# Patient Record
Sex: MALE | Race: WHITE | NOT HISPANIC OR LATINO | URBAN - METROPOLITAN AREA
[De-identification: names, ages, dates, MRNs, and addresses within clinical notes are randomized per-mention and may not be internally consistent; named-entity substitution may affect disease eponyms.]

---

## 2021-08-08 ENCOUNTER — INPATIENT (INPATIENT)
Facility: HOSPITAL | Age: 81
LOS: 10 days | Discharge: HOME | End: 2021-08-19
Attending: THORACIC SURGERY (CARDIOTHORACIC VASCULAR SURGERY) | Admitting: THORACIC SURGERY (CARDIOTHORACIC VASCULAR SURGERY)
Payer: MEDICAID

## 2021-08-08 VITALS
TEMPERATURE: 96 F | WEIGHT: 164.91 LBS | RESPIRATION RATE: 18 BRPM | DIASTOLIC BLOOD PRESSURE: 54 MMHG | HEART RATE: 55 BPM | SYSTOLIC BLOOD PRESSURE: 90 MMHG | OXYGEN SATURATION: 99 %

## 2021-08-08 DIAGNOSIS — K52.1 TOXIC GASTROENTERITIS AND COLITIS: ICD-10-CM
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
## 2021-08-08 DIAGNOSIS — T82.855A STENOSIS OF CORONARY ARTERY STENT, INITIAL ENCOUNTER: ICD-10-CM
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
## 2021-08-08 LAB
ALBUMIN SERPL ELPH-MCNC: 4.3 G/DL — SIGNIFICANT CHANGE UP (ref 3.5–5.2)
ALP SERPL-CCNC: 69 U/L — SIGNIFICANT CHANGE UP (ref 30–115)
ALT FLD-CCNC: 17 U/L — SIGNIFICANT CHANGE UP (ref 0–41)
ANION GAP SERPL CALC-SCNC: 11 MMOL/L — SIGNIFICANT CHANGE UP (ref 7–14)
APTT BLD: 28.7 SEC — SIGNIFICANT CHANGE UP (ref 27–39.2)
APTT BLD: 85 SEC — CRITICAL HIGH (ref 27–39.2)
AST SERPL-CCNC: 20 U/L — SIGNIFICANT CHANGE UP (ref 0–41)
BASOPHILS # BLD AUTO: 0.01 K/UL — SIGNIFICANT CHANGE UP (ref 0–0.2)
BASOPHILS NFR BLD AUTO: 0.1 % — SIGNIFICANT CHANGE UP (ref 0–1)
BILIRUB SERPL-MCNC: 0.6 MG/DL — SIGNIFICANT CHANGE UP (ref 0.2–1.2)
BUN SERPL-MCNC: 15 MG/DL — SIGNIFICANT CHANGE UP (ref 10–20)
CALCIUM SERPL-MCNC: 9.4 MG/DL — SIGNIFICANT CHANGE UP (ref 8.5–10.1)
CHLORIDE SERPL-SCNC: 98 MMOL/L — SIGNIFICANT CHANGE UP (ref 98–110)
CO2 SERPL-SCNC: 25 MMOL/L — SIGNIFICANT CHANGE UP (ref 17–32)
CREAT SERPL-MCNC: 1.1 MG/DL — SIGNIFICANT CHANGE UP (ref 0.7–1.5)
EOSINOPHIL # BLD AUTO: 0.02 K/UL — SIGNIFICANT CHANGE UP (ref 0–0.7)
EOSINOPHIL NFR BLD AUTO: 0.2 % — SIGNIFICANT CHANGE UP (ref 0–8)
GLUCOSE SERPL-MCNC: 145 MG/DL — HIGH (ref 70–99)
HCT VFR BLD CALC: 41.7 % — LOW (ref 42–52)
HGB BLD-MCNC: 14.5 G/DL — SIGNIFICANT CHANGE UP (ref 14–18)
IMM GRANULOCYTES NFR BLD AUTO: 0.4 % — HIGH (ref 0.1–0.3)
INR BLD: 1.48 RATIO — HIGH (ref 0.65–1.3)
LYMPHOCYTES # BLD AUTO: 1.17 K/UL — LOW (ref 1.2–3.4)
LYMPHOCYTES # BLD AUTO: 12.3 % — LOW (ref 20.5–51.1)
MAGNESIUM SERPL-MCNC: 1.8 MG/DL — SIGNIFICANT CHANGE UP (ref 1.8–2.4)
MCHC RBC-ENTMCNC: 31.8 PG — HIGH (ref 27–31)
MCHC RBC-ENTMCNC: 34.8 G/DL — SIGNIFICANT CHANGE UP (ref 32–37)
MCV RBC AUTO: 91.4 FL — SIGNIFICANT CHANGE UP (ref 80–94)
MONOCYTES # BLD AUTO: 0.8 K/UL — HIGH (ref 0.1–0.6)
MONOCYTES NFR BLD AUTO: 8.4 % — SIGNIFICANT CHANGE UP (ref 1.7–9.3)
NEUTROPHILS # BLD AUTO: 7.5 K/UL — HIGH (ref 1.4–6.5)
NEUTROPHILS NFR BLD AUTO: 78.6 % — HIGH (ref 42.2–75.2)
NRBC # BLD: 0 /100 WBCS — SIGNIFICANT CHANGE UP (ref 0–0)
NT-PROBNP SERPL-SCNC: 1531 PG/ML — HIGH (ref 0–300)
PLATELET # BLD AUTO: 169 K/UL — SIGNIFICANT CHANGE UP (ref 130–400)
POTASSIUM SERPL-MCNC: 4.4 MMOL/L — SIGNIFICANT CHANGE UP (ref 3.5–5)
POTASSIUM SERPL-SCNC: 4.4 MMOL/L — SIGNIFICANT CHANGE UP (ref 3.5–5)
PROT SERPL-MCNC: 6.6 G/DL — SIGNIFICANT CHANGE UP (ref 6–8)
PROTHROM AB SERPL-ACNC: 17 SEC — HIGH (ref 9.95–12.87)
RBC # BLD: 4.56 M/UL — LOW (ref 4.7–6.1)
RBC # FLD: 11.4 % — LOW (ref 11.5–14.5)
SARS-COV-2 RNA SPEC QL NAA+PROBE: SIGNIFICANT CHANGE UP
SODIUM SERPL-SCNC: 134 MMOL/L — LOW (ref 135–146)
TROPONIN T SERPL-MCNC: 0.04 NG/ML — CRITICAL HIGH
TROPONIN T SERPL-MCNC: 0.04 NG/ML — CRITICAL HIGH
TROPONIN T SERPL-MCNC: 0.15 NG/ML — CRITICAL HIGH
WBC # BLD: 9.54 K/UL — SIGNIFICANT CHANGE UP (ref 4.8–10.8)
WBC # FLD AUTO: 9.54 K/UL — SIGNIFICANT CHANGE UP (ref 4.8–10.8)

## 2021-08-08 PROCEDURE — 71046 X-RAY EXAM CHEST 2 VIEWS: CPT | Mod: 26

## 2021-08-08 PROCEDURE — 99222 1ST HOSP IP/OBS MODERATE 55: CPT

## 2021-08-08 PROCEDURE — 93010 ELECTROCARDIOGRAM REPORT: CPT

## 2021-08-08 PROCEDURE — 99291 CRITICAL CARE FIRST HOUR: CPT

## 2021-08-08 RX ORDER — SODIUM CHLORIDE 9 MG/ML
500 INJECTION INTRAMUSCULAR; INTRAVENOUS; SUBCUTANEOUS ONCE
Refills: 0 | Status: COMPLETED | OUTPATIENT
Start: 2021-08-08 | End: 2021-08-08

## 2021-08-08 RX ORDER — ATORVASTATIN CALCIUM 80 MG/1
40 TABLET, FILM COATED ORAL AT BEDTIME
Refills: 0 | Status: DISCONTINUED | OUTPATIENT
Start: 2021-08-08 | End: 2021-08-12

## 2021-08-08 RX ORDER — ATENOLOL 25 MG/1
50 TABLET ORAL DAILY
Refills: 0 | Status: DISCONTINUED | OUTPATIENT
Start: 2021-08-09 | End: 2021-08-09

## 2021-08-08 RX ORDER — HEPARIN SODIUM 5000 [USP'U]/ML
900 INJECTION INTRAVENOUS; SUBCUTANEOUS
Qty: 25000 | Refills: 0 | Status: DISCONTINUED | OUTPATIENT
Start: 2021-08-08 | End: 2021-08-09

## 2021-08-08 RX ORDER — HEPARIN SODIUM 5000 [USP'U]/ML
INJECTION INTRAVENOUS; SUBCUTANEOUS
Qty: 25000 | Refills: 0 | Status: DISCONTINUED | OUTPATIENT
Start: 2021-08-08 | End: 2021-08-08

## 2021-08-08 RX ORDER — DRONEDARONE 400 MG/1
400 TABLET, FILM COATED ORAL
Refills: 0 | Status: DISCONTINUED | OUTPATIENT
Start: 2021-08-09 | End: 2021-08-12

## 2021-08-08 RX ORDER — ASPIRIN/CALCIUM CARB/MAGNESIUM 324 MG
325 TABLET ORAL ONCE
Refills: 0 | Status: COMPLETED | OUTPATIENT
Start: 2021-08-08 | End: 2021-08-08

## 2021-08-08 RX ORDER — HEPARIN SODIUM 5000 [USP'U]/ML
4400 INJECTION INTRAVENOUS; SUBCUTANEOUS EVERY 6 HOURS
Refills: 0 | Status: DISCONTINUED | OUTPATIENT
Start: 2021-08-08 | End: 2021-08-09

## 2021-08-08 RX ORDER — AMLODIPINE BESYLATE 2.5 MG/1
10 TABLET ORAL DAILY
Refills: 0 | Status: DISCONTINUED | OUTPATIENT
Start: 2021-08-09 | End: 2021-08-11

## 2021-08-08 RX ORDER — HEPARIN SODIUM 5000 [USP'U]/ML
4400 INJECTION INTRAVENOUS; SUBCUTANEOUS ONCE
Refills: 0 | Status: COMPLETED | OUTPATIENT
Start: 2021-08-08 | End: 2021-08-08

## 2021-08-08 RX ADMIN — HEPARIN SODIUM 9 UNIT(S)/HR: 5000 INJECTION INTRAVENOUS; SUBCUTANEOUS at 23:42

## 2021-08-08 RX ADMIN — SODIUM CHLORIDE 500 MILLILITER(S): 9 INJECTION INTRAMUSCULAR; INTRAVENOUS; SUBCUTANEOUS at 15:22

## 2021-08-08 RX ADMIN — ATORVASTATIN CALCIUM 40 MILLIGRAM(S): 80 TABLET, FILM COATED ORAL at 22:30

## 2021-08-08 RX ADMIN — HEPARIN SODIUM 4400 UNIT(S): 5000 INJECTION INTRAVENOUS; SUBCUTANEOUS at 15:29

## 2021-08-08 RX ADMIN — HEPARIN SODIUM 900 UNIT(S)/HR: 5000 INJECTION INTRAVENOUS; SUBCUTANEOUS at 15:29

## 2021-08-08 RX ADMIN — SODIUM CHLORIDE 500 MILLILITER(S): 9 INJECTION INTRAMUSCULAR; INTRAVENOUS; SUBCUTANEOUS at 13:47

## 2021-08-08 NOTE — ED PROVIDER NOTE - PROGRESS NOTE DETAILS
Dr. Cordova: Pt has elevated troponin. ASA ordered and cardiology consulted and will evaluate pt. Dr. Cordova: talked with cardiology. They recommend heparin trip and tele. If troponin increases they will consider CCU upgrade.

## 2021-08-08 NOTE — H&P ADULT - ASSESSMENT
Chest Pain: likely unstable angina  H/O CAD s/p Stents 2010  Troponin: 0.4 x2, f/up 8pm levels  EKG with AFIB, no ST changes   Evaluated by Cardio, possible Cath in AM  C/w Heparin Infusion and f/up PTT's   Sublingual Nitro for continued CP   NPO after midnight    Atrial Fibrillation  EKG on admission with AFIB   Eliquis Switched to Heparin drip per ACS protocol   Rate control with Multaq and Atenolol    HTN + HLD:  Hypotensive on admission likely from drug overdose   c/w Amlodipine,  Atenolol and Statin   **Hold in Meds in AM if BP is still low***    DVT PPX: Heparin drip  GI PPX: not indicated  Full Code  Dispo: from Home  -Pending Cardi Clearance

## 2021-08-08 NOTE — ED PROVIDER NOTE - ABNORMAL RHYTHM
----- Message from Jeannette Ibarra sent at 11/26/2019  8:51 AM CST -----  Re: meds he is taking  Would like to speak to Dr Mario SCHROEDER [phone]    
Discussed with patient.  Recent flare starting during trip to [place].  No recent antibiotics.  Did develop what he thought was a minor infection in [place].    Symptoms still persist despite mesalamine 4.8 g daily.    Recommend resume budesonide 3 tablets daily x2 weeks then reduce to 2 pills daily.  Has appointment coming up in 3 to 4 weeks.  Take calcium while on budesonide.  All questions answered  
Per Dr Martin advice, Taking Mesalamine 4 pills per day for last five days. Still having problems with colitis.  Please call [phone]  
atrial fibrillation

## 2021-08-08 NOTE — ED PROVIDER NOTE - PHYSICAL EXAMINATION
Const: Well nourished, well developed, appears stated age  Eyes: PERRL, no conjunctival injection  HENT:  Neck supple without meningismus   CV: RRR, Warm, well-perfused extremities  RESP: CTA B/L, no tachypnea   GI: soft, non-tender, non-distended  MSK: No gross deformities appreciated  Skin: Warm, dry. No rashes  Neuro: Alert, CNs II-XII grossly intact. Sensation and motor function of extremities grossly intact.  Psych: Appropriate mood and affect. Const: Well nourished, well developed, appears stated age  Eyes: PERRL, no conjunctival injection  HENT:  Neck supple without meningismus   CV: irregular and bradycardic Warm, well-perfused extremities  RESP: CTA B/L, no tachypnea   GI: soft, non-tender, non-distended  MSK: No gross deformities appreciated  Skin: Warm, dry. No rashes  Neuro: Alert, CNs II-XII grossly intact. Sensation and motor function of extremities grossly intact.  Psych: Appropriate mood and affect.

## 2021-08-08 NOTE — ED PROVIDER NOTE - NS ED ROS FT
Review of Systems   Constitutional:  No Weight Change, No Fever, No Chills, No weakness    ENT/Mouth:  No Nasal Congestion, No Hoarseness, No sore throat, No Rhinorrhea, No Swallowing Difficulty  Eyes:  No Eye Pain, No Swelling, No Redness, No Discharge, No Vision Changes  Cardiovascular:  + Chest Pain, + palpitations, + Dyspnea on Exertion, No Orthopnea  Respiratory:  No SOB, No Cough, No Wheezing  Gastrointestinal:  No Nausea, No Vomiting, No Diarrhea, No Constipation, No abdominal pain, No melena or bright red blood per rectum  Genitourinary:  No Dysuria, No Urinary Frequency, No Hematuria, No Urinary Incontinence,  Musculoskeletal:  No Arthralgias, No Myalgias, No Joint Swelling, No Joint Stiffness,  Skin:  No rashes

## 2021-08-08 NOTE — ED ADULT NURSE NOTE - NSSUHOSCREENINGYN_ED_ALL_ED
Spoke with Pt. Son [facility] that she lives with. Update given as requested. Home meds reviewed and updated. Pt. Previously on Valcyclovir  1 gm. TID- Last dose was on 9/14 and he believed patient had three more days to complete prescription. Yes - the patient is able to be screened

## 2021-08-08 NOTE — ED PROVIDER NOTE - CLINICAL SUMMARY MEDICAL DECISION MAKING FREE TEXT BOX
82 yo M presented to ED for CP. Pt found to have ACS with elevated troponin of 0.04. Cardiology was consulted and recommended heparin drip. Pt also loaded with ASA. Cardiology will fu and if pt has uptrending troponin will consider upgrade to CCU. Pt admitted to tele for further evaluation and management.

## 2021-08-08 NOTE — ED ADULT NURSE NOTE - OBJECTIVE STATEMENT
Patient c/o midsternal chest pain radiating to back x 2 days increasing today, +nausea and dizziness. Patient denies cardiac hx. Patient states pain comes and goes and describes the pain as sharp

## 2021-08-08 NOTE — CONSULT NOTE ADULT - SUBJECTIVE AND OBJECTIVE BOX
HISTORY OF PRESENT ILLNESS:   80 yo M hx of CAD s/p PCI x 3 last 2010, Afib on Eliquis and HLD presented with cc of chest pain and syncope. Patient felt palpitations at home overnight. Patient took extra dose of multaq 400mg and atenolol 50mg at 2am. Patient developed syncope x 2 witnessed by wife and reported no trauma to the head. Patient continue to experience chest pressure and subsequently came to ED at [facility]. At the time of this eval, patient continue to experience chest pressure. SBP initially 90s and recovered to 107 with HR 55.     PAST MEDICAL & SURGICAL HISTORY  CAD  HLD  HTN    SOCIAL HISTORY:  []smoker  []Alcohol  []Drug    ROS:  CONSTITUTIONAL: No weakness, fevers or chills  EYES/ENT: No visual changes;  No vertigo or throat pain   NECK: No pain or stiffness  RESPIRATORY: No cough, wheezing, hemoptysis; No shortness of breath  CARDIOVASCULAR: chest pressure and palpitations  GASTROINTESTINAL: No abdominal or epigastric pain. No nausea, vomiting, or hematemesis; No diarrhea or constipation. No melena or hematochezia.  GENITOURINARY: No dysuria, frequency or hematuria  NEUROLOGICAL: No numbness or weakness  SKIN: No itching, burning, rashes, or lesions   PSYCH: No Depression, no anxiety  All other review of systems is negative unless indicated above.    ALLERGIES:  Allergy Status Unknown      MEDICATIONS:  MEDICATIONS  (STANDING):  heparin   Injectable 4400 Unit(s) IV Push once  heparin  Infusion.  Unit(s)/Hr (9 mL/Hr) IV Continuous <Continuous>    MEDICATIONS  (PRN):  heparin   Injectable 4400 Unit(s) IV Push every 6 hours PRN For aPTT less than 40      HOME MEDICATIONS:  Home Medications:      VITALS:   T(F): 96.2 (08-08 @ 12:09), Max: 96.2 (08-08 @ 12:09)  HR: 55 (08-08 @ 14:51) (55 - 55)  BP: 106/56 (08-08 @ 14:51) (90/54 - 106/56)  BP(mean): --  RR: 18 (08-08 @ 14:51) (18 - 18)  SpO2: 99% (08-08 @ 14:51) (99% - 99%)    I&O's Summary      PHYSICAL EXAM:  GEN: Not in acute distress  HEENT: NCAT, PERRL, EOMI  LUNGS: Clear to auscultation bilaterally   CARDIOVASCULAR: Regular bradycardic  ABD: Soft, non-tender, non-distended  EXT: No CHERYL  SKIN: Intact  NEURO: AAOx3    LABS:                        14.5   9.54  )-----------( 169      ( 08 Aug 2021 13:23 )             41.7     08-08    134<L>  |  98  |  15  ----------------------------<  145<H>  4.4   |  25  |  1.1    Ca    9.4      08 Aug 2021 13:23  Mg     1.8     08-08    TPro  6.6  /  Alb  4.3  /  TBili  0.6  /  DBili  x   /  AST  20  /  ALT  17  /  AlkPhos  69  08-08      Troponin T, Serum: 0.04 ng/mL *HH* (08-08-21 @ 13:23)    Troponin 0.04, CKMB --, CK --/ 08-08-21 @ 13:23    Serum Pro-Brain Natriuretic Peptide: 1531 pg/mL (08-08-21 @ 13:23)      Hemoglobin A1C   Thyroid    EKG: Afib with intermittent block  Tele: sinus ryan with PVC

## 2021-08-08 NOTE — H&P ADULT - NSHPREVIEWOFSYSTEMS_GEN_ALL_CORE
REVIEW OF SYSTEMS:    CONSTITUTIONAL: No weakness, fevers or chills  EYES/ENT: No visual changes;  No vertigo or throat pain   NECK: No pain or stiffness  RESPIRATORY: No cough, wheezing, hemoptysis; No shortness of breath  CARDIOVASCULAR: + chest pain or palpitations  GASTROINTESTINAL: No abdominal or epigastric pain. No nausea, vomiting, or hematemesis; No diarrhea or constipation. No melena or hematochezia.  GENITOURINARY: No dysuria, frequency or hematuria  NEUROLOGICAL: No numbness or weakness  SKIN: No itching, rashes REVIEW OF SYSTEMS:    CONSTITUTIONAL: No weakness, fevers or chills  EYES/ENT: No visual changes;  No vertigo or throat pain   NECK: No pain or stiffness  RESPIRATORY: No cough, wheezing, hemoptysis; No shortness of breath  CARDIOVASCULAR: + chest pain + palpitations  GASTROINTESTINAL: No abdominal or epigastric pain. No nausea, vomiting, or hematemesis; No diarrhea or constipation. No melena or hematochezia.  GENITOURINARY: No dysuria, frequency or hematuria  NEUROLOGICAL: No numbness or weakness  SKIN: No itching, rashes

## 2021-08-08 NOTE — H&P ADULT - NSHPLABSRESULTS_GEN_ALL_CORE
LABS:                          14.5   9.54  )-----------( 169      ( 08 Aug 2021 13:23 )             41.7     08-08    134<L>  |  98  |  15  ----------------------------<  145<H>  4.4   |  25  |  1.1    Ca    9.4      08 Aug 2021 13:23  Mg     1.8     08-08    TPro  6.6  /  Alb  4.3  /  TBili  0.6  /  DBili  x   /  AST  20  /  ALT  17  /  AlkPhos  69  08-08            PT/INR - ( 08 Aug 2021 15:14 )   PT: 17.00 sec;   INR: 1.48 ratio         PTT - ( 08 Aug 2021 15:14 )  PTT:28.7 sec  Lactate Trend    CARDIAC MARKERS ( 08 Aug 2021 15:14 )  x     / 0.04 ng/mL / x     / x     / x      CARDIAC MARKERS ( 08 Aug 2021 13:23 )  x     / 0.04 ng/mL / x     / x     / x          CAPILLARY BLOOD GLUCOSE    EKGS:  < from: 12 Lead ECG (08.08.21 @ 12:14) >    Ventricular Rate 97 BPM    QRS Duration 100 ms    Q-T Interval 388 ms    QTC Calculation(Bazett) 492 ms    R Axis 51 degrees    T Axis 77 degrees    Diagnosis Line Atrial fibrillation  Minimal voltage criteria for LVH, may be normal variant ( [place] product )  Prolonged QT  Abnormal ECG      < end of copied text >

## 2021-08-08 NOTE — CONSULT NOTE ADULT - ASSESSMENT
80 yo M hx of CAD s/p PCI x 3 last 2010, Afib on Eliquis and HLD presented with cc of chest pain and syncope.     ACS  Syncope  Afib  Bradycardia    - Patient currently continue to experience chest pressure and CE elevated on admission.  - will start heparin gtt. Tentatively placed on schedule for cath in the am.   - Please keep patient NPO after MN. Admission COVID test pending result.  - Will continue to trend CE. If persistent elevation or HD unstable, will escalate to CCU level of care.  - Check TTE.  - Monitor on tele.  - Hold tonight's dose of multaq, atenolol and eliquis. Restart multaq and atenolol in the am if HD stable.  - Plan discussed with attending cardiologist.

## 2021-08-08 NOTE — ED PROVIDER NOTE - OBJECTIVE STATEMENT
82 yo M with PMH of CAD sp 3 stents (2001, 2010), HTN, afib (on atenolol, Multaq and Eliquis) presents to ED for CP that started last night around 11pm. Pt states the CP radiated to his L arm and was associated with palpitations. Pt follows with a cardiologist in [place] and was told if this occurs he should take an extra dose of his medications so around 2am he took an extra dose of Multaq and atenolol and took his morning dose. However, he came to the ED today because his is still having the CP and palpitations. He also noticed that he has CP with exertion which is new for him. He normally walks a lot but has needed to stop to rest while walking up the stairs due to CP. No LE swelling. No nausea, vomiting, abdominal pain, fevers or chills. Pt vaccinated against COVID. Pt has not had a cath or stress test in many years.

## 2021-08-08 NOTE — H&P ADULT - ATTENDING COMMENTS
81 year old male with PMH of CAD s/p PCI x 3 last 2010, Afib on Eliquis and HLD presented with cc of chest pain. Chest pain started  last night around 11pm, described as heavy pressure, 6/10, with radiation to left arm associated with Palpitations, he took an extra dose of Multaq (Extra 400mg) and atenolol(extra 50mg)  but his pain continued so he came to ED. He has mild exertional SOB, no fever or cough. IN ED: Pt was hypotensive to 90's with improvement to 100's  initially 90s, bradycardic to  HR 50's   Labs: Trop: 0.04 x 2, EKG: Afib, no ST changes     PHYSICAL EXAM:  GENERAL: NAD, well-developed.  HEAD:  Atraumatic, Normocephalic.  EYES: EOMI, PERRLA, conjunctiva and sclera clear.  NECK: Supple, No JVD.  CHEST/LUNG: Clear to auscultation bilaterally; No wheeze.  HEART: Regular rate and rhythm; S1 S2.   ABDOMEN: Soft, Nontender, Nondistended; Bowel sounds present.  EXTREMITIES:  2+ Peripheral Pulses, No clubbing, cyanosis, or edema.  PSYCH: AAOx3.  NEUROLOGY: non-focal.  SKIN: No rashes or lesions.    A/P:   NSTEMI  CAD s/p PCI  EKG showed T wave inversion I, aVL.   Troponin peak 0.14  Cardiac cath today showed two lesion on LAD proximal 80%, mid 90%, LCx 90% stenosis after Om bifurcation, % stenosis in distal edge of previous stent.   CT surgery consult for bypass.   Continue Anticoagulation with Lovenox for another 24 hrs. Continue ASA, Atenolol (dose reduced to 25mg) and Lipitor 80mg daily.     Paroxysmal Atrial fibrillation;   Sinus rhythm today  Continue Multaq and Atenolol. Eliquis on hold while on Lovenox.

## 2021-08-08 NOTE — H&P ADULT - NSHPPHYSICALEXAM_GEN_ALL_CORE
General: NAD, resting comfortably in bed     Heart: S1/S2 appreciated, RRR, no Murmurs     Lungs: CTA b/l, no adventitious sounds    Abdomen: Soft, NT, ND    Musculoskeletal: Atraumatic, No LE edema, no skin color changes     Neuro: AO x 3, no focal deficit

## 2021-08-08 NOTE — H&P ADULT - HISTORY OF PRESENT ILLNESS
Patient is a 82 yo M hx of CAD s/p PCI x 3 last 2010, Afib on Eliquis and HLD presented with cc of chest pain. Chest pain started  last night around 11pm, described as heavy pressure, 6/10, with radiation to left arm associated with Palpitations. Patient follows with a cardiologist in [place] and was told if this occurs he should take an extra dose of his medications so around 2am he took an extra dose of Multaq (Extra 400mg) and atenolol(extra 50mg)  and took his morning dose. However, he came to the ED today because his is still having the CP and palpitations. He also noticed that he has CP with exertion which is new for him. He normally walks a lot but has needed to stop to rest while walking up the stairs due to CP. No LE swelling. No nausea, vomiting, abdominal pain, fevers or chills. Pt vaccinated against       IN ED: Pt was hypotensive to 90's with improvement to 100's  initially 90s, bradycardic to  HR 50's   Labs: Trop: 0.04 x 2, EKG: Afib, no ST changes      Patient is a 82 yo M hx of CAD s/p PCI x 3 last 2010, Afib on Eliquis and HLD presented with cc of chest pain. Chest pain started  last night around 11pm, described as heavy pressure, 6/10, with radiation to left arm associated with Palpitations. Patient follows with a cardiologist in [place] and was told if this occurs he should take an extra dose of his medications so around 2am he took an extra dose of Multaq (Extra 400mg) and atenolol(extra 50mg)  and took his morning dose. However, he came to the ED today because his is still having the CP and palpitations. He also noticed that he has CP with exertion which is new for him. He normally walks a lot but has needed to stop to rest while walking up the stairs due to CP. No LE swelling. No nausea, vomiting, abdominal pain, fevers or chills.    IN ED: Pt was hypotensive to 90's with improvement to 100's  initially 90s, bradycardic to  HR 50's   Labs: Trop: 0.04 x 2, EKG: Afib, no ST changes

## 2021-08-08 NOTE — CONSULT NOTE ADULT - ATTENDING COMMENTS
Patient seen and examined with the fellow 8/8/21 at 2:45 PM in the ER  Still with episodes of chest pain, suggestive of angina  Known CAD, s/p prior PCI's  Mild troponin elevation  Paroxysmal AF - converted to SB  Follows with Dr. Tapia in [place].    Admit for ACS  Heparin gtt. Hold Eliquis  C/w BB, statin  ASA  Cath in AM. If he needs a PCI, will load with Plavix  D/w patient and wife at the bedside.

## 2021-08-09 LAB
ANION GAP SERPL CALC-SCNC: 9 MMOL/L — SIGNIFICANT CHANGE UP (ref 7–14)
APTT BLD: 54.8 SEC — HIGH (ref 27–39.2)
APTT BLD: 62 SEC — HIGH (ref 27–39.2)
BASOPHILS # BLD AUTO: 0.01 K/UL — SIGNIFICANT CHANGE UP (ref 0–0.2)
BASOPHILS NFR BLD AUTO: 0.1 % — SIGNIFICANT CHANGE UP (ref 0–1)
BUN SERPL-MCNC: 13 MG/DL — SIGNIFICANT CHANGE UP (ref 10–20)
CALCIUM SERPL-MCNC: 8.8 MG/DL — SIGNIFICANT CHANGE UP (ref 8.5–10.1)
CHLORIDE SERPL-SCNC: 104 MMOL/L — SIGNIFICANT CHANGE UP (ref 98–110)
CO2 SERPL-SCNC: 25 MMOL/L — SIGNIFICANT CHANGE UP (ref 17–32)
COVID-19 SPIKE DOMAIN AB INTERP: POSITIVE
COVID-19 SPIKE DOMAIN ANTIBODY RESULT: >250 U/ML — HIGH
CREAT SERPL-MCNC: 0.9 MG/DL — SIGNIFICANT CHANGE UP (ref 0.7–1.5)
EOSINOPHIL # BLD AUTO: 0.05 K/UL — SIGNIFICANT CHANGE UP (ref 0–0.7)
EOSINOPHIL NFR BLD AUTO: 0.5 % — SIGNIFICANT CHANGE UP (ref 0–8)
GLUCOSE SERPL-MCNC: 122 MG/DL — HIGH (ref 70–99)
HCT VFR BLD CALC: 43.2 % — SIGNIFICANT CHANGE UP (ref 42–52)
HGB BLD-MCNC: 15 G/DL — SIGNIFICANT CHANGE UP (ref 14–18)
IMM GRANULOCYTES NFR BLD AUTO: 0.4 % — HIGH (ref 0.1–0.3)
LYMPHOCYTES # BLD AUTO: 1.9 K/UL — SIGNIFICANT CHANGE UP (ref 1.2–3.4)
LYMPHOCYTES # BLD AUTO: 19.8 % — LOW (ref 20.5–51.1)
MCHC RBC-ENTMCNC: 31.9 PG — HIGH (ref 27–31)
MCHC RBC-ENTMCNC: 34.7 G/DL — SIGNIFICANT CHANGE UP (ref 32–37)
MCV RBC AUTO: 91.9 FL — SIGNIFICANT CHANGE UP (ref 80–94)
MONOCYTES # BLD AUTO: 0.92 K/UL — HIGH (ref 0.1–0.6)
MONOCYTES NFR BLD AUTO: 9.6 % — HIGH (ref 1.7–9.3)
NEUTROPHILS # BLD AUTO: 6.67 K/UL — HIGH (ref 1.4–6.5)
NEUTROPHILS NFR BLD AUTO: 69.6 % — SIGNIFICANT CHANGE UP (ref 42.2–75.2)
NRBC # BLD: 0 /100 WBCS — SIGNIFICANT CHANGE UP (ref 0–0)
PLATELET # BLD AUTO: 161 K/UL — SIGNIFICANT CHANGE UP (ref 130–400)
POTASSIUM SERPL-MCNC: 4 MMOL/L — SIGNIFICANT CHANGE UP (ref 3.5–5)
POTASSIUM SERPL-SCNC: 4 MMOL/L — SIGNIFICANT CHANGE UP (ref 3.5–5)
RBC # BLD: 4.7 M/UL — SIGNIFICANT CHANGE UP (ref 4.7–6.1)
RBC # FLD: 11.4 % — LOW (ref 11.5–14.5)
SARS-COV-2 IGG+IGM SERPL QL IA: >250 U/ML — HIGH
SARS-COV-2 IGG+IGM SERPL QL IA: POSITIVE
SODIUM SERPL-SCNC: 138 MMOL/L — SIGNIFICANT CHANGE UP (ref 135–146)
WBC # BLD: 9.59 K/UL — SIGNIFICANT CHANGE UP (ref 4.8–10.8)
WBC # FLD AUTO: 9.59 K/UL — SIGNIFICANT CHANGE UP (ref 4.8–10.8)

## 2021-08-09 PROCEDURE — 71250 CT THORAX DX C-: CPT | Mod: 26

## 2021-08-09 PROCEDURE — 93010 ELECTROCARDIOGRAM REPORT: CPT

## 2021-08-09 PROCEDURE — 93458 L HRT ARTERY/VENTRICLE ANGIO: CPT | Mod: 26

## 2021-08-09 PROCEDURE — 99232 SBSQ HOSP IP/OBS MODERATE 35: CPT | Mod: 57

## 2021-08-09 PROCEDURE — 99252 IP/OBS CONSLTJ NEW/EST SF 35: CPT | Mod: 57

## 2021-08-09 PROCEDURE — 93306 TTE W/DOPPLER COMPLETE: CPT | Mod: 26

## 2021-08-09 PROCEDURE — 93880 EXTRACRANIAL BILAT STUDY: CPT | Mod: 26

## 2021-08-09 PROCEDURE — 99223 1ST HOSP IP/OBS HIGH 75: CPT

## 2021-08-09 RX ORDER — ASPIRIN/CALCIUM CARB/MAGNESIUM 324 MG
81 TABLET ORAL DAILY
Refills: 0 | Status: DISCONTINUED | OUTPATIENT
Start: 2021-08-09 | End: 2021-08-12

## 2021-08-09 RX ORDER — ATENOLOL 25 MG/1
25 TABLET ORAL DAILY
Refills: 0 | Status: DISCONTINUED | OUTPATIENT
Start: 2021-08-09 | End: 2021-08-11

## 2021-08-09 RX ORDER — ENOXAPARIN SODIUM 100 MG/ML
70 INJECTION SUBCUTANEOUS
Refills: 0 | Status: DISCONTINUED | OUTPATIENT
Start: 2021-08-09 | End: 2021-08-11

## 2021-08-09 RX ORDER — SODIUM CHLORIDE 9 MG/ML
1000 INJECTION INTRAMUSCULAR; INTRAVENOUS; SUBCUTANEOUS
Refills: 0 | Status: DISCONTINUED | OUTPATIENT
Start: 2021-08-09 | End: 2021-08-13

## 2021-08-09 RX ADMIN — DRONEDARONE 400 MILLIGRAM(S): 400 TABLET, FILM COATED ORAL at 05:56

## 2021-08-09 RX ADMIN — SODIUM CHLORIDE 50 MILLILITER(S): 9 INJECTION INTRAMUSCULAR; INTRAVENOUS; SUBCUTANEOUS at 10:00

## 2021-08-09 RX ADMIN — AMLODIPINE BESYLATE 10 MILLIGRAM(S): 2.5 TABLET ORAL at 06:03

## 2021-08-09 RX ADMIN — ATORVASTATIN CALCIUM 40 MILLIGRAM(S): 80 TABLET, FILM COATED ORAL at 22:05

## 2021-08-09 RX ADMIN — Medication 81 MILLIGRAM(S): at 17:39

## 2021-08-09 RX ADMIN — ATENOLOL 50 MILLIGRAM(S): 25 TABLET ORAL at 05:57

## 2021-08-09 RX ADMIN — ENOXAPARIN SODIUM 70 MILLIGRAM(S): 100 INJECTION SUBCUTANEOUS at 17:39

## 2021-08-09 RX ADMIN — DRONEDARONE 400 MILLIGRAM(S): 400 TABLET, FILM COATED ORAL at 17:39

## 2021-08-09 NOTE — PROGRESS NOTE ADULT - SUBJECTIVE AND OBJECTIVE BOX
Patient is a 81y old  Male who presents with a chief complaint of Chest pain (08 Aug 2021 17:18)    HPI:  Patient is a 82 yo M hx of CAD s/p PCI x 3 last 2010, Afib on Eliquis and HLD presented with cc of chest pain. Chest pain started  last night around 11pm, described as heavy pressure, 6/10, with radiation to left arm associated with Palpitations. Patient follows with a cardiologist in [place] and was told if this occurs he should take an extra dose of his medications so around 2am he took an extra dose of Multaq (Extra 400mg) and atenolol(extra 50mg)  and took his morning dose. However, he came to the ED today because his is still having the CP and palpitations. He also noticed that he has CP with exertion which is new for him. He normally walks a lot but has needed to stop to rest while walking up the stairs due to CP. No LE swelling. No nausea, vomiting, abdominal pain, fevers or chills.    IN ED: Pt was hypotensive to 90's with improvement to 100's  initially 90s, bradycardic to  HR 50's   Labs: Trop: 0.04 x 2, EKG: Afib, no ST changes      (08 Aug 2021 17:18)      SUBJ:  Patient seen and examined. No chest pain today. Troponin +. Remains in NSR.  Cath revealed 3 vessel disease.      MEDICATIONS  (STANDING):  amLODIPine   Tablet 10 milliGRAM(s) Oral daily  aspirin enteric coated 81 milliGRAM(s) Oral daily  ATENolol  Tablet 50 milliGRAM(s) Oral daily  atorvastatin Oral Tab/Cap - Peds 40 milliGRAM(s) Oral at bedtime  dronedarone 400 milliGRAM(s) Oral two times a day  sodium chloride 0.9%. 1000 milliLiter(s) (50 mL/Hr) IV Continuous <Continuous>    MEDICATIONS  (PRN):            Vital Signs Last 24 Hrs  T(C): 35.9 (09 Aug 2021 04:43), Max: 36.2 (08 Aug 2021 20:35)  T(F): 96.7 (09 Aug 2021 04:43), Max: 97.1 (08 Aug 2021 20:35)  HR: 62 (09 Aug 2021 04:43) (55 - 62)  BP: 160/78 (09 Aug 2021 04:43) (90/54 - 160/78)  BP(mean): --  RR: 18 (09 Aug 2021 04:43) (18 - 18)  SpO2: 99% (08 Aug 2021 14:51) (99% - 99%)      PHYSICAL EXAM:    GEN: AAO x 3, NAD  HEENT: NC/AT, PERRL  Neck: No JVD, no bruits  CV: Reg, S1-S2, no murmur  Lungs: CTAB  Abd: Soft, non-tender  Ext: No edema        08-08-21 @ 07:01  -  08-09-21 @ 07:00  --------------------------------------------------------  IN: 72 mL / OUT: 0 mL / NET: 72 mL        I&O's Summary    08 Aug 2021 07:01  -  09 Aug 2021 07:00  --------------------------------------------------------  IN: 72 mL / OUT: 0 mL / NET: 72 mL    	    TELEMETRY:  SB    ECG:  < from: 12 Lead ECG (08.08.21 @ 12:14) >  Atrial fibrillation  Minimal voltage criteria for LVH, may be normal variant ( [place] product )  Prolonged QT  Abnormal ECG    < end of copied text >    TTE:  pending    Cath:    Left main: Normal    LAD:        Prox: 80% stenosis at S1       Mid: ulcerated plaque, subsequent 90% stenosis       Dist: Mild disease  Diag: Mild disease    Left Circumflex:        Prox: Mild to moderate disease; 90% stenosis in mid segment after OM bifurcation       Dist: Moderate disease  OM: Mild disease    Right Coronary Artery: 100% stenosis at the distal edge of previous stent    RPDA: supplied by faint collaterals from LAD    SYNTAX I/II SCORE: 34    INTERVENTION: none  IMPLANTS: none    APPROPRIATE USE CRITERIA (AUC): N/A    SPECIMEN REMOVED: N/A    POST-OP DIAGNOSIS  Significant 3-vessel disease    LABS:                        15.0   9.59  )-----------( 161      ( 09 Aug 2021 06:25 )             43.2     08-09    138  |  104  |  13  ----------------------------<  122<H>  4.0   |  25  |  0.9    Ca    8.8      09 Aug 2021 06:25  Mg     1.8     08-08    TPro  6.6  /  Alb  4.3  /  TBili  0.6  /  DBili  x   /  AST  20  /  ALT  17  /  AlkPhos  69  08-08    CARDIAC MARKERS ( 08 Aug 2021 19:58 )  x     / 0.15 ng/mL / x     / x     / x      CARDIAC MARKERS ( 08 Aug 2021 15:14 )  x     / 0.04 ng/mL / x     / x     / x      CARDIAC MARKERS ( 08 Aug 2021 13:23 )  x     / 0.04 ng/mL / x     / x     / x          PT/INR - ( 08 Aug 2021 15:14 )   PT: 17.00 sec;   INR: 1.48 ratio         PTT - ( 09 Aug 2021 06:25 )  PTT:54.8 sec      BNPSerum Pro-Brain Natriuretic Peptide: 1531 pg/mL (08-08 @ 13:23)    RADIOLOGY & ADDITIONAL STUDIES:      IMPRESSION AND PLAN:

## 2021-08-09 NOTE — PROGRESS NOTE ADULT - SUBJECTIVE AND OBJECTIVE BOX
RADHA BAKER 81y Male  MRN#: [id number]   CODE STATUS: full code     Hospital Day: 1d    Pt is currently admitted with the primary diagnosis of ACS    SUBJECTIVE  Hospital Course    Overnight events: No acute events overnight. Patient went to cath lab in the morning.     Subjective complaints     Present Today:   - Vazquez:  No [  ], Yes [   ] : Indication:     - Type of IV Access:       .. CVC/Piccline:  No [  ], Yes [   ] : Indication:       .. Midline: No [  ], Yes [   ] : Indication:                                             ----------------------------------------------------------  OBJECTIVE  PAST MEDICAL & SURGICAL HISTORY  CAD (coronary artery disease)    HLD (hyperlipidemia)    Hypertension                                              -----------------------------------------------------------  ALLERGIES:  Allergy Status Unknown                                            ------------------------------------------------------------    HOME MEDICATIONS  Home Medications:  amLODIPine 10 mg oral tablet: 1 tab(s) orally once a day (08 Aug 2021 17:45)  atenolol 50 mg oral tablet: 1 tab(s) orally once a day (08 Aug 2021 17:45)  Eliquis 5 mg oral tablet: 1 tab(s) orally 2 times a day (08 Aug 2021 17:45)  Lipitor 40 mg oral tablet: 1 tab(s) orally once a day (08 Aug 2021 17:45)  Multaq 400 mg oral tablet: 1 tab(s) orally 2 times a day (08 Aug 2021 17:44)                           MEDICATIONS:  STANDING MEDICATIONS  amLODIPine   Tablet 10 milliGRAM(s) Oral daily  aspirin enteric coated 81 milliGRAM(s) Oral daily  ATENolol  Tablet 50 milliGRAM(s) Oral daily  atorvastatin Oral Tab/Cap - Peds 40 milliGRAM(s) Oral at bedtime  dronedarone 400 milliGRAM(s) Oral two times a day  sodium chloride 0.9%. 1000 milliLiter(s) IV Continuous <Continuous>    PRN MEDICATIONS                                            ------------------------------------------------------------  VITAL SIGNS: Last 24 Hours  T(C): 35.9 (09 Aug 2021 04:43), Max: 36.2 (08 Aug 2021 20:35)  T(F): 96.7 (09 Aug 2021 04:43), Max: 97.1 (08 Aug 2021 20:35)  HR: 62 (09 Aug 2021 04:43) (55 - 62)  BP: 160/78 (09 Aug 2021 04:43) (90/54 - 160/78)  BP(mean): --  RR: 18 (09 Aug 2021 04:43) (18 - 18)  SpO2: 99% (08 Aug 2021 14:51) (99% - 99%)      08-08-21 @ 07:01  -  08-09-21 @ 07:00  --------------------------------------------------------  IN: 72 mL / OUT: 0 mL / NET: 72 mL                                             --------------------------------------------------------------  LABS:                        15.0   9.59  )-----------( 161      ( 09 Aug 2021 06:25 )             43.2     08-09    138  |  104  |  13  ----------------------------<  122<H>  4.0   |  25  |  0.9    Ca    8.8      09 Aug 2021 06:25  Mg     1.8     08-08    TPro  6.6  /  Alb  4.3  /  TBili  0.6  /  DBili  x   /  AST  20  /  ALT  17  /  AlkPhos  69  08-08    PT/INR - ( 08 Aug 2021 15:14 )   PT: 17.00 sec;   INR: 1.48 ratio         PTT - ( 09 Aug 2021 06:25 )  PTT:54.8 sec      Troponin T, Serum: 0.15 ng/mL ** (08-08-21 @ 19:58)  Troponin T, Serum: 0.04 ng/mL ** (08-08-21 @ 15:14)  Troponin T, Serum: 0.04 ng/mL ** (08-08-21 @ 13:23)          CARDIAC MARKERS ( 08 Aug 2021 19:58 )  x     / 0.15 ng/mL / x     / x     / x      CARDIAC MARKERS ( 08 Aug 2021 15:14 )  x     / 0.04 ng/mL / x     / x     / x      CARDIAC MARKERS ( 08 Aug 2021 13:23 )  x     / 0.04 ng/mL / x     / x     / x                                                  -------------------------------------------------------------  RADIOLOGY:                                            --------------------------------------------------------------    PHYSICAL EXAM:  General:   HEENT:  LUNGS:  HEART:  ABDOMEN:  EXT:  NEURO:  SKIN:                                           --------------------------------------------------------------         RADHA BAKER 81y Male  MRN#: [id number]   CODE STATUS: full code     Hospital Day: 1d    Pt is currently admitted with the primary diagnosis of ACS    SUBJECTIVE  Hospital Course    Overnight events: No acute events overnight. Patient went to cath lab in the morning.     Subjective complaints     Present Today:   - Vazquez:  No [ X ], Yes [   ] : Indication:     - Type of IV Access:       .. CVC/Piccline:  No [  X], Yes [   ] : Indication:       .. Midline: No [X  ], Yes [   ] : Indication:                                             ----------------------------------------------------------  OBJECTIVE  PAST MEDICAL & SURGICAL HISTORY  CAD (coronary artery disease)    HLD (hyperlipidemia)    Hypertension                                              -----------------------------------------------------------  ALLERGIES:  Allergy Status Unknown                                            ------------------------------------------------------------    HOME MEDICATIONS  Home Medications:  amLODIPine 10 mg oral tablet: 1 tab(s) orally once a day (08 Aug 2021 17:45)  atenolol 50 mg oral tablet: 1 tab(s) orally once a day (08 Aug 2021 17:45)  Eliquis 5 mg oral tablet: 1 tab(s) orally 2 times a day (08 Aug 2021 17:45)  Lipitor 40 mg oral tablet: 1 tab(s) orally once a day (08 Aug 2021 17:45)  Multaq 400 mg oral tablet: 1 tab(s) orally 2 times a day (08 Aug 2021 17:44)                           MEDICATIONS:  STANDING MEDICATIONS  amLODIPine   Tablet 10 milliGRAM(s) Oral daily  aspirin enteric coated 81 milliGRAM(s) Oral daily  ATENolol  Tablet 50 milliGRAM(s) Oral daily  atorvastatin Oral Tab/Cap - Peds 40 milliGRAM(s) Oral at bedtime  dronedarone 400 milliGRAM(s) Oral two times a day  sodium chloride 0.9%. 1000 milliLiter(s) IV Continuous <Continuous>    PRN MEDICATIONS                                            ------------------------------------------------------------  VITAL SIGNS: Last 24 Hours  T(C): 35.9 (09 Aug 2021 04:43), Max: 36.2 (08 Aug 2021 20:35)  T(F): 96.7 (09 Aug 2021 04:43), Max: 97.1 (08 Aug 2021 20:35)  HR: 62 (09 Aug 2021 04:43) (55 - 62)  BP: 160/78 (09 Aug 2021 04:43) (90/54 - 160/78)  BP(mean): --  RR: 18 (09 Aug 2021 04:43) (18 - 18)  SpO2: 99% (08 Aug 2021 14:51) (99% - 99%)      08-08-21 @ 07:01  -  08-09-21 @ 07:00  --------------------------------------------------------  IN: 72 mL / OUT: 0 mL / NET: 72 mL                                             --------------------------------------------------------------  LABS:                        15.0   9.59  )-----------( 161      ( 09 Aug 2021 06:25 )             43.2     08-09    138  |  104  |  13  ----------------------------<  122<H>  4.0   |  25  |  0.9    Ca    8.8      09 Aug 2021 06:25  Mg     1.8     08-08    TPro  6.6  /  Alb  4.3  /  TBili  0.6  /  DBili  x   /  AST  20  /  ALT  17  /  AlkPhos  69  08-08    PT/INR - ( 08 Aug 2021 15:14 )   PT: 17.00 sec;   INR: 1.48 ratio         PTT - ( 09 Aug 2021 06:25 )  PTT:54.8 sec      Troponin T, Serum: 0.15 ng/mL ** (08-08-21 @ 19:58)  Troponin T, Serum: 0.04 ng/mL ** (08-08-21 @ 15:14)  Troponin T, Serum: 0.04 ng/mL ** (08-08-21 @ 13:23)          CARDIAC MARKERS ( 08 Aug 2021 19:58 )  x     / 0.15 ng/mL / x     / x     / x      CARDIAC MARKERS ( 08 Aug 2021 15:14 )  x     / 0.04 ng/mL / x     / x     / x      CARDIAC MARKERS ( 08 Aug 2021 13:23 )  x     / 0.04 ng/mL / x     / x     / x                                                  -------------------------------------------------------------  RADIOLOGY:                                            --------------------------------------------------------------    PHYSICAL EXAM:  General:   HEENT:  LUNGS:  HEART:  ABDOMEN:  EXT:  NEURO:  SKIN:                                           --------------------------------------------------------------         RADHA BAKER 81y Male  MRN#: [id number]   CODE STATUS: full code     Hospital Day: 1d    Pt is currently admitted with the primary diagnosis of ACS    SUBJECTIVE  Hospital Course    Overnight events: No acute events overnight. Patient went to cath lab in the morning. Came back from cath lab. No symptomatic complaints currently    Subjective complaints     Present Today:   - Vazquez:  No [ X ], Yes [   ] : Indication:     - Type of IV Access:       .. CVC/Piccline:  No [  X], Yes [   ] : Indication:       .. Midline: No [X  ], Yes [   ] : Indication:                                             ----------------------------------------------------------  OBJECTIVE  PAST MEDICAL & SURGICAL HISTORY  CAD (coronary artery disease)    HLD (hyperlipidemia)    Hypertension                                              -----------------------------------------------------------  ALLERGIES:  Allergy Status Unknown                                            ------------------------------------------------------------    HOME MEDICATIONS  Home Medications:  amLODIPine 10 mg oral tablet: 1 tab(s) orally once a day (08 Aug 2021 17:45)  atenolol 50 mg oral tablet: 1 tab(s) orally once a day (08 Aug 2021 17:45)  Eliquis 5 mg oral tablet: 1 tab(s) orally 2 times a day (08 Aug 2021 17:45)  Lipitor 40 mg oral tablet: 1 tab(s) orally once a day (08 Aug 2021 17:45)  Multaq 400 mg oral tablet: 1 tab(s) orally 2 times a day (08 Aug 2021 17:44)                           MEDICATIONS:  STANDING MEDICATIONS  amLODIPine   Tablet 10 milliGRAM(s) Oral daily  aspirin enteric coated 81 milliGRAM(s) Oral daily  ATENolol  Tablet 50 milliGRAM(s) Oral daily  atorvastatin Oral Tab/Cap - Peds 40 milliGRAM(s) Oral at bedtime  dronedarone 400 milliGRAM(s) Oral two times a day  sodium chloride 0.9%. 1000 milliLiter(s) IV Continuous <Continuous>    PRN MEDICATIONS                                            ------------------------------------------------------------  VITAL SIGNS: Last 24 Hours  T(C): 35.9 (09 Aug 2021 04:43), Max: 36.2 (08 Aug 2021 20:35)  T(F): 96.7 (09 Aug 2021 04:43), Max: 97.1 (08 Aug 2021 20:35)  HR: 62 (09 Aug 2021 04:43) (55 - 62)  BP: 160/78 (09 Aug 2021 04:43) (90/54 - 160/78)  BP(mean): --  RR: 18 (09 Aug 2021 04:43) (18 - 18)  SpO2: 99% (08 Aug 2021 14:51) (99% - 99%)      08-08-21 @ 07:01  -  08-09-21 @ 07:00  --------------------------------------------------------  IN: 72 mL / OUT: 0 mL / NET: 72 mL                                             --------------------------------------------------------------  LABS:                        15.0   9.59  )-----------( 161      ( 09 Aug 2021 06:25 )             43.2     08-09    138  |  104  |  13  ----------------------------<  122<H>  4.0   |  25  |  0.9    Ca    8.8      09 Aug 2021 06:25  Mg     1.8     08-08    TPro  6.6  /  Alb  4.3  /  TBili  0.6  /  DBili  x   /  AST  20  /  ALT  17  /  AlkPhos  69  08-08    PT/INR - ( 08 Aug 2021 15:14 )   PT: 17.00 sec;   INR: 1.48 ratio         PTT - ( 09 Aug 2021 06:25 )  PTT:54.8 sec      Troponin T, Serum: 0.15 ng/mL ** (08-08-21 @ 19:58)  Troponin T, Serum: 0.04 ng/mL *HH* (08-08-21 @ 15:14)  Troponin T, Serum: 0.04 ng/mL ** (08-08-21 @ 13:23)          CARDIAC MARKERS ( 08 Aug 2021 19:58 )  x     / 0.15 ng/mL / x     / x     / x      CARDIAC MARKERS ( 08 Aug 2021 15:14 )  x     / 0.04 ng/mL / x     / x     / x      CARDIAC MARKERS ( 08 Aug 2021 13:23 )  x     / 0.04 ng/mL / x     / x     / x                                                  -------------------------------------------------------------  RADIOLOGY:                                            --------------------------------------------------------------    PHYSICAL EXAM:  General: well appearing, not in acute distress, comfortable  LUNGS: CTAB, no rales, wheeze  HEART: RRR, no murmur  ABDOMEN: NBS, soft, nontender to palpation  EXT: no peripheral pitting edema                                           --------------------------------------------------------------

## 2021-08-09 NOTE — CHART NOTE - NSCHARTNOTEFT_GEN_A_CORE
Pre cath note:    indication:  [ ] STEMI                [X] NSTEMI                 [ ] Acute coronary syndrome                     [ ]Unstable Angina   [ ] high risk  [ ] intermediate risk  [ ] low risk                     [ ] Stable Angina     non-invasive testing:                          Date:                     result: [ ] high risk  [ ] intermediate risk  [ ] low risk    Anti- Anginal medications:                    [ ] not used                       [X] used                   [ ] not used but strong indication not to use    Ejection Fraction                   [ ] <29            [ ] 30-39%   [ ] 40-49%     [ ]>50%    CHF                   [ ] active (within last 14 days on meds   [ ] Chronic (on meds but no exacerbation)    COPD                   [ ] mild (on chronic bronchodilators)  [ ] moderate (on chronic steroid therapy)      [ ] severe (indication for home O2 or PACO2 >50)    Other risk factors:                       [ ] Previous MI                     [ ] CVA/ stroke                    [ ] carotid stent/ CEA                    [ ] PVD/PAD- (arterial aneurysm, non-palpable pulses, tortuous vessel with inability to insert catheter, infra-renal dissection, renal or subclavian artery stenosis)                    [ ] diabetic                    [ ] previous CABG                    [ ] Renal Failure                           14.5   9.54  )-----------( 169      ( 08 Aug 2021 13:23 )             41.7     08-08    134<L>  |  98  |  15  ----------------------------<  145<H>  4.4   |  25  |  1.1    Ca    9.4      08 Aug 2021 13:23  Mg     1.8     08-08    TPro  6.6  /  Alb  4.3  /  TBili  0.6  /  DBili  x   /  AST  20  /  ALT  17  /  AlkPhos  69  08-08                         -Patient Schedule for LHC/PCI today  -Keep NPO after midnight  -Hold Anticoagulation prior to PCI: Holding Eliquis  -Start IV Fluids NS at : 3ml/Kg for 1 Hr prior to procedure Pre cath note:    indication:  [ ] STEMI                [X] NSTEMI                 [ ] Acute coronary syndrome                     [ ]Unstable Angina   [ ] high risk  [ ] intermediate risk  [ ] low risk                     [ ] Stable Angina     non-invasive testing:                          Date:                     result: [ ] high risk  [ ] intermediate risk  [ ] low risk    Anti- Anginal medications:                    [ ] not used                       [X] used                   [ ] not used but strong indication not to use    Ejection Fraction                   [ ] <29            [ ] 30-39%   [ ] 40-49%     [ ]>50%    CHF                   [ ] active (within last 14 days on meds   [ ] Chronic (on meds but no exacerbation)    COPD                   [ ] mild (on chronic bronchodilators)  [ ] moderate (on chronic steroid therapy)      [ ] severe (indication for home O2 or PACO2 >50)    Other risk factors:                       [ ] Previous MI                     [ ] CVA/ stroke                    [ ] carotid stent/ CEA                    [ ] PVD/PAD- (arterial aneurysm, non-palpable pulses, tortuous vessel with inability to insert catheter, infra-renal dissection, renal or subclavian artery stenosis)                    [ ] diabetic                    [ ] previous CABG                    [ ] Renal Failure                           14.5   9.54  )-----------( 169      ( 08 Aug 2021 13:23 )             41.7     08-08    134<L>  |  98  |  15  ----------------------------<  145<H>  4.4   |  25  |  1.1    Ca    9.4      08 Aug 2021 13:23  Mg     1.8     08-08    TPro  6.6  /  Alb  4.3  /  TBili  0.6  /  DBili  x   /  AST  20  /  ALT  17  /  AlkPhos  69  08-08                         -Patient Schedule for LHC/PCI today  -Keep NPO after midnight  -Hold Anticoagulation prior to PCI: Hold heparin for cardiac cath  -Start IV Fluids NS at : 3ml/Kg for 1 Hr prior to procedure

## 2021-08-09 NOTE — CONSULT NOTE ADULT - TIME BILLING
CTS ATTENDING:  pt interviewed and examined  case discussed with   Pt and family made aware of the need for CABG  procedure explained, including risks, benefits and alternatives  Pt will need carotid duplex, chest ct and pfts.  tentatively scheduled for Thursday.   stop eliquis.  -FMR

## 2021-08-09 NOTE — PROGRESS NOTE ADULT - ASSESSMENT
82 y/o male with CAD, s/p PCI x 3, AF, admitted with NSTEMI  Cath revealed severe multivessel disease.    Findings were discussed with the patient  Refer to CT surgery for CABG evaluation  No Plavix  C/w ASA. Hold Eliquis - start Heparin drip today  C/w BB, Multaq  C/w statin  2D echo.

## 2021-08-09 NOTE — CHART NOTE - NSCHARTNOTEFT_GEN_A_CORE
PRE-OP DIAGNOSIS: NSTEMI    PROCEDURE:[  ] C                         [X] Coronary angiography                         [  ] [facility]    Physician: Dr. Davis   Interventional Fellow: Dr. Olvera  Fellow: Dr. Deleon    ANESTHESIA TYPE:  [  ]General Anesthesia  [ x ] Sedation  [  ] Local/Regional    ESTIMATED BLOOD LOSS:    10   mL    CONDITION  [  ] Critical  [  ] Serious  [  ]Fair  [ x ]Good    IV CONTRAST:     35        mL    FINDINGS  Left Heart Catheterization:  LVEF%:  LVEDP:    ACCESS:    [ ] right radial artery  [X] right femoral artery    HEMOSTASIS:    [ ] D-Stat  [ ] Perclose  [X] Manual compression    LEFT HEART CATHETERIZATION                                    Left main: Normal    LAD:        Prox: 80% stenosis       Mid: ulcerated plaque, 90% stenosis       Dist: Mild disease  Diag: Mild disease    Left Circumflex:        Prox: Mild to moderate disease; 90% stenosis in        Dist: Moderate disease  OM: Mild disease    Right Coronary Artery: 100% stenosis at the distal edge of previous stent    RPDA: supplied by collaterals from left side    SYNTAX I/II SCORE: 34    INTERVENTION: none  IMPLANTS: none    APPROPRIATE USE CRITERIA (AUC): N/A    SPECIMEN REMOVED: N/A    POST-OP DIAGNOSIS  Significant 3-vessel disease      PLAN OF CARE  [X] Return to In-patient bed  [X] NS at 50cc/hr  [X] Aspirin 81mg daily, high intensity statin, beta-blockers  [X] Restart heparin drip 6 hours after removal of sheath  [X] CT Surgery consult called PRE-OP DIAGNOSIS: NSTEMI    PROCEDURE:[  ] C                         [X] Coronary angiography                         [  ] [facility]    Physician: Dr. Davis   Interventional Fellow: Dr. Olvera  Fellow: Dr. Deleon    ANESTHESIA TYPE:  [  ]General Anesthesia  [ x ] Sedation  [  ] Local/Regional    ESTIMATED BLOOD LOSS:    10   mL    CONDITION  [  ] Critical  [  ] Serious  [  ]Fair  [ x ]Good    IV CONTRAST:     35        mL    FINDINGS  Left Heart Catheterization:  LVEF%:  LVEDP:    ACCESS:    [ ] right radial artery  [X] right femoral artery    HEMOSTASIS:    [ ] D-Stat  [ ] Perclose  [X] Manual compression    LEFT HEART CATHETERIZATION                                    Left main: Normal    LAD:        Prox: 80% stenosis       Mid: ulcerated plaque, 90% stenosis       Dist: Mild disease  Diag: Mild disease    Left Circumflex:        Prox: Mild to moderate disease; 90% stenosis in        Dist: Moderate disease  OM: Mild disease    Right Coronary Artery: 100% stenosis at the distal edge of previous stent    RPDA: supplied by collaterals from left side    SYNTAX I/II SCORE: 34    INTERVENTION: none  IMPLANTS: none    APPROPRIATE USE CRITERIA (AUC): N/A    SPECIMEN REMOVED: N/A    POST-OP DIAGNOSIS  Significant 3-vessel disease      PLAN OF CARE  [X] Return to In-patient bed  [X] NS at 50cc/hr  [X] Aspirin 81mg daily, high intensity statin, beta-blockers  [X] Restart heparin drip 6 hours after removal of sheath  [X] Check 2D echo  [X] CT Surgery consult called PRE-OP DIAGNOSIS: NSTEMI    PROCEDURE:[  ] LHC                         [X] Coronary angiography                         [  ] [facility]    Physician: Dr. Davis   Interventional Fellow: Dr. Olvera  Fellow: Dr. Deleon    ANESTHESIA TYPE:  [  ]General Anesthesia  [ x ] Sedation  [  ] Local/Regional    ESTIMATED BLOOD LOSS:    10   mL    CONDITION  [  ] Critical  [  ] Serious  [  ]Fair  [ x ]Good    IV CONTRAST:     35        mL    FINDINGS  Left Heart Catheterization:  LVEF%:  LVEDP:    ACCESS:    [x ] right radial artery (tortuous, unable to advance the sheath - switched to femoral)  [X] right femoral artery (tortuous, required long sheath)    HEMOSTASIS:    [ ] D-Stat  [ ] Perclose  [X] Manual compression    LEFT HEART CATHETERIZATION                                    Left main: Normal    LAD:        Prox: 80% stenosis at S1       Mid: ulcerated plaque, subsequent 90% stenosis       Dist: Mild disease  Diag: Mild disease    Left Circumflex:        Prox: Mild to moderate disease; 90% stenosis in mid segment after OM bifurcation       Dist: Moderate disease  OM: Mild disease    Right Coronary Artery: 100% stenosis at the distal edge of previous stent    RPDA: supplied by faint collaterals from LAD    SYNTAX I/II SCORE: 34    INTERVENTION: none  IMPLANTS: none    APPROPRIATE USE CRITERIA (AUC): N/A    SPECIMEN REMOVED: N/A    POST-OP DIAGNOSIS  Significant 3-vessel disease      PLAN OF CARE  [X] Return to In-patient bed  [X] NS at 50cc/hr  [X] Aspirin 81mg daily, high intensity statin, beta-blockers  [X] Restart heparin drip 6 hours after removal of sheath  [X] Check 2D echo  [X] CT Surgery consult called

## 2021-08-09 NOTE — PROGRESS NOTE ADULT - ASSESSMENT
ASSESSMENT & PLAN    Past medical history and hospital course                                                                                                           ----------------------------------------------------  # DVT prophylaxis     # GI prophylaxis     # Diet     # Activity Score (AM-PAC)    # Code status     # Disposition                                                                              --------------------------------------------------------    # Handoff  ASSESSMENT & PLAN    Patient is a 80 yo M hx of CAD s/p PCI x 3 last 2010, Afib on Eliquis and HLD presented with chest pain found to have NSTEMI, found to have severe multivessel disease on cath (LAD 80% stenosis in L1, Mid 90% stenosis, left circumflex and ptox to have 90% stenosis in mid sigment after OM bifurcation, and % stenosis) awaiting CABG evaluation currently HDS.     #NSTEMI 2/2 multivessel CAD  -f/u CT surgery recs. Per CT surgry, will hold ACEI/ARB/CCB 24 hours prior to planned procedure, LUE precaution for possible radial artery harvest, obtain pre-op labs  Troponin: 0.4 x2, then increased to 0.15  EKG with AFIB, no ST changes   C/w Heparin Infusion and f/up PTT's   Sublingual Nitro for continued CP     #Paroxysmal Atrial Fibrillation  EKG on admission with AFIB  Eliquis Switched to Heparin drip per [facility] protocol   Rate control with Multaq 400mg BID and Atenolol 40mg 25mg QD    #HTN:  - c/w Amlodipine, Atenolol and Statin. No plan for surgery tomorrow    #HLD  -atorvastatin 40mg QD      DVT PPX: Heparin drip  GI PPX: not indicated  Full Code  Dispo: from Home  -Pending Cardi Clearance    ASSESSMENT & PLAN    Patient is a 82 yo M hx of CAD s/p PCI x 3 last 2010, Afib on Eliquis and HLD presented with chest pain found to have NSTEMI, found to have severe multivessel disease on cath (LAD 80% stenosis in L1, Mid 90% stenosis, left circumflex and ptox to have 90% stenosis in mid sigment after OM bifurcation, and % stenosis) awaiting CABG evaluation currently HDS.     #NSTEMI 2/2 multivessel CAD  -f/u CT surgery recs. Per CT surgry, will hold ACEI/ARB/CCB 24 hours prior to planned procedure, LUE precaution for possible radial artery harvest, obtain pre-op labs  Troponin: 0.4 x2, then increased to 0.15  EKG with AFIB, no ST changes   switched to Lovenox 70mg BID until 24 hours prior to surgery to switch back to heparin  Sublingual Nitro for continued CP     #Paroxysmal Atrial Fibrillation  EKG on admission with AFIB  Eliquis Switched to Heparin drip per [facility] protocol   Rate control with Multaq 400mg BID and Atenolol 40mg 25mg QD    #HTN:  - c/w Amlodipine, Atenolol and Statin. No plan for surgery tomorrow    #HLD  -atorvastatin 40mg QD      DVT PPX: Heparin drip  GI PPX: not indicated  Full Code  Dispo: from Home  -Pending Cardi Clearance    ASSESSMENT & PLAN    Patient is a 82 yo M hx of CAD s/p PCI x 3 last 2010, Afib on Eliquis and HLD presented with chest pain found to have NSTEMI, found to have severe multivessel disease on cath (LAD 80% stenosis in L1, Mid 90% stenosis, left circumflex and ptox to have 90% stenosis in mid sigment after OM bifurcation, and % stenosis) awaiting CABG evaluation currently HDS.     #NSTEMI 2/2 multivessel CAD  -f/u CT surgery recs. Per CT surgry, will hold ACEI/ARB/CCB 24 hours prior to planned procedure, LUE precaution for possible radial artery harvest, obtain pre-op labs  Troponin: 0.4 x2, then increased to 0.15  EKG with AFIB, no ST changes   switched to Lovenox 70mg BID until 24 hours prior to surgery to switch back to heparin  Sublingual Nitro for continued CP     #Paroxysmal Atrial Fibrillation  EKG on admission with AFIB  Eliquis Switched to Heparin drip then now on therapeutic lovenox per [facility] protocol   Rate control with Multaq 400mg BID and Atenolol 40mg 25mg QD    #HTN:  - c/w Amlodipine, Atenolol and Statin. No plan for surgery tomorrow    #HLD  -atorvastatin 40mg QD      DVT PPX: Therapeutic lovenox  GI PPX: not indicated  Full Code  Dispo: from Home  -Pending Cardi Clearance

## 2021-08-09 NOTE — CONSULT NOTE ADULT - SUBJECTIVE AND OBJECTIVE BOX
Surgeon: Dr. Valero/Luis/Marie    Consult requesting by: Dr. Grady Davis  CARD: Toby Kay ([place])  PMD:    Wife: Myra: [phone]    CC: Syncopex2 w/CP + palpitations.    HISTORY OF PRESENT ILLNESS:  Patient is a 80 yo M hx of CAD s/p PCI x 3 (2001x1; 2010 x2), Afib on Eliquis (last dose 8/8), HTN and HLD. Patient presented c/o Syncope x2 associated with chest pain and palpitations. Chest pain started  last night around 11pm, described as heavy pressure, 6/10, with radiation to left arm associated with Palpitations. Patient follows with a cardiologist in [place] and was told if this occurs he should take an extra dose of his medications so around 2am he took an extra dose of Multaq (Extra 400mg) and atenolol(extra 50mg)  and took his morning dose. However, he came to the ED today because his is still having the CP and palpitations. He also noticed that he has CP with exertion which is new for him. He normally walks a lot but has needed to stop to rest while walking up the stairs due to CP. No LE swelling. No nausea, vomiting, abdominal pain, fevers or chills. Patient ruled in NSTEMI w/peak trop 0.15 and subsequent cardiac cath revealed 3vcad. CT surgery consulted for possible myocardial revascularization via CABG.      NYHA functional class    [ ] Class I (no limitation) [ x] Class II (slight limitation) [ ] Class III (marked limitation) [ ] Class IV (symptoms at rest)    PAST MEDICAL & SURGICAL HISTORY:  CAD (coronary artery disease)  HLD (hyperlipidemia)  Hypertension        MEDICATIONS  (STANDING):  amLODIPine   Tablet 10 milliGRAM(s) Oral daily  aspirin enteric coated 81 milliGRAM(s) Oral daily  ATENolol  Tablet 25 milliGRAM(s) Oral daily  atorvastatin Oral Tab/Cap - Peds 40 milliGRAM(s) Oral at bedtime  dronedarone 400 milliGRAM(s) Oral two times a day  enoxaparin Injectable 70 milliGRAM(s) SubCutaneous two times a day  sodium chloride 0.9%. 1000 milliLiter(s) (50 mL/Hr) IV Continuous <Continuous>    Home Medications:  amLODIPine 10 mg oral tablet: 1 tab(s) orally once a day (08 Aug 2021 17:45)  atenolol 50 mg oral tablet: 1 tab(s) orally once a day (08 Aug 2021 17:45)  Eliquis 5 mg oral tablet: 1 tab(s) orally 2 times a day (08 Aug 2021 17:45)  Lipitor 40 mg oral tablet: 1 tab(s) orally once a day (08 Aug 2021 17:45)  Multaq 400 mg oral tablet: 1 tab(s) orally 2 times a day (08 Aug 2021 17:44)      Allergies    Allergy Status Unknown    Intolerances        SOCIAL HISTORY:  Smoker: [ ] Yes  [x ] No        PACK YEARS:                         WHEN QUIT?  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      Review of Systems  CONSTITUTIONAL:  Fevers[ ] chills[ ] sweats[ ] fatigue[ ] weight loss[ ] weight gain [ ]                                     NEGATIVE [X ]   NEURO:  paresthesias[ ] seizures [ ]  syncope [x ]  confusion [ ]                                                                                 EYES: glasses[ ]  blurry vision[ ]  discharge[ ] pain[ ] glaucoma [ ]                                                                          NEGATIVE[X ]   ENMT:  difficulty hearing [ ]  vertigo[ ]  dysphagia[ ] epistaxis[ ] recent dental work [ ]                                    NEGATIVE[ X]   CV:  chest pain[ ] palpitations[ ] VARGAS [x ] diaphoresis [ ]                                                                                             RESPIRATORY:  wheezing[ ] SOB[x ] cough [ ] sputum[ ] hemoptysis[ ]                                                                     GI:  nausea[ ]  vomiting [ ]  diarrhea[ ] constipation [ ] melena [ ]                                                                         NEGATIVE[ X]   : hematuria[ ]  dysuria[ ] urgency[ ] incontinence[ ]                                                                                            NEGATIVE[ X]   MUSKULOSKELETAL:  arthritis[ ]  joint swelling [ ] muscle weakness [ ] Hx vein stripping [ ]                             NEGATIVE[X ]   SKIN/BREAST:  rash[ ] itching [ ]  hair loss[ ] masses[ ]                                                                                              NEGATIVE[ X]   PSYCH:  dementia [ ] depression [ ] anxiety[ ]                                                                                                               NEGATIVE[X ]   HEME/LYMPH:  bruises easily[ ] enlarged lymph nodes[ ] tender lymph nodes[ ]                                               NEGATIVE[ X]   ENDOCRINE:  cold intolerance[ ] heat intolerance[ ] polydipsia[ ]                                                                          NEGATIVE[ X]     PHYSICAL EXAM  Vital Signs Last 24 Hrs  T(C): 35.9 (09 Aug 2021 04:43), Max: 36.2 (08 Aug 2021 20:35)  T(F): 96.7 (09 Aug 2021 04:43), Max: 97.1 (08 Aug 2021 20:35)  HR: 62 (09 Aug 2021 04:43) (55 - 62)  BP: 160/78 (09 Aug 2021 04:43) (90/54 - 160/78)  RR: 18 (09 Aug 2021 04:43) (18 - 18)  SpO2: 99% (08 Aug 2021 14:51) (99% - 99%)  Right arm bp: unable to assess 2/2 left radial cath attempt                                Left arm bp:    CONSTITUTIONAL:  WNL[x ]   Neuro: WNL [x ] Normal exam oriented to person/place & time with no focal motor or sensory  deficits. Other                     Eyes:    WNL [ x] Normal exam of conjunctiva & lids, pupils equally reactive. Other     ENT:     WNL [x ] Normal exam of nasal/oral mucosa with absence of cyanosis. Other  Neck:   WNL [x ] Normal exam of jugular veins, trachea & thyroid. Other  Chest:  Unlabored respirations wit + bibasilar crackles no rales, or rhonchi.                                                                                CV:  Auscultation: normal [ x] S3[ ] S4[ ] Irregular [ ] Rub[ ] Clicks[ ]    Murmurs none:[x ]systolic [ ]  diastolic [ ] holosystolic [ ]  Carotids: No Bruits[x ] Other                  Abdominal Aorta: normal [x ] nonpalpable[ ]Other                                                                                      GI: WNL[x ] Normal exam of abdomen, liver & spleen with no noted masses or tenderness. Other                                                                                                        Extremities: WNL[x ] Normal no evidence of cyanosis or deformity Edema: none[x ]trace[ ]1+[ ]2+[ ]3+[ ]4+[ ]  Lower Extremity Pulses: 1+ b/l dp pulses Varicosities[ ]  SKIN :WNL[x ] Normal exam to inspection & palpation. Other:                                                          LABS:                        15.0   9.59  )-----------( 161      ( 09 Aug 2021 06:25 )             43.2     08-09    138  |  104  |  13  ----------------------------<  122<H>  4.0   |  25  |  0.9    Ca    8.8      09 Aug 2021 06:25  Mg     1.8     08-08    TPro  6.6  /  Alb  4.3  /  TBili  0.6  /  DBili  x   /  AST  20  /  ALT  17  /  AlkPhos  69  08-08    PT/INR - ( 08 Aug 2021 15:14 )   PT: 17.00 sec;   INR: 1.48 ratio       PTT - ( 09 Aug 2021 06:25 )  PTT:54.8 sec    CARDIAC MARKERS ( 08 Aug 2021 19:58 )  x     / 0.15 ng/mL / x     / x     / x      CARDIAC MARKERS ( 08 Aug 2021 15:14 )  x     / 0.04 ng/mL / x     / x     / x      CARDIAC MARKERS ( 08 Aug 2021 13:23 )  x     / 0.04 ng/mL / x     / x     / x        Serum Pro-Brain Natriuretic Peptide: 1531 pg/mL (08.08.21 @ 13:23)    COVID Result: COVID-19 PCR: NotDetec (08-08-21 @ 14:08)    Cardiac Cath: FINDINGS  LEFT HEART CATHETERIZATION                                  Left main: Normal  LAD:        Prox: 80% stenosis at S1       Mid: ulcerated plaque, subsequent 90% stenosis       Dist: Mild disease  Diag: Mild disease  Left Circumflex:        Prox: Mild to moderate disease; 90% stenosis in mid segment after OM bifurcation       Dist: Moderate disease  OM: Mild disease  Right Coronary Artery: 100% stenosis at the distal edge of previous stent  RPDA: supplied by faint collaterals from LAD    SYNTAX I/II SCORE: 34    POST-OP DIAGNOSIS  Significant 3-vessel disease     CT CHEST: PENDING    TTE: PENDING    PFTs: PENDING    STS Score:  Isolated CAB  Risk of Mortality: 1.887%  Renal Failure: 1.217%  Permanent Stroke: 1.280%  Prolonged Ventilation: 7.349%  DSW Infection: 0.243%  Reoperation: 2.552%  Morbidity or Mortality: 11.287%  Short Length of Stay: 29.137%  Long Length of Stay: 5.944%    Impression:  CAD [x ]  Valvular  disease [ ]   Aortic Disease [ ]   SAULO: Yes[ ] No [ ]   CKD Stage I [ ] , Stage II [ ] , Stage III [ ], Stage IV [ ]   Anemia: Yes [ ], No [ ]  Diabetes :Yes [ ], No [ ]  Acute MI: Yes [ ], No [ ]   Heart Failure: Yes [ ] , No [ ] HFpEF [ ], HFrEF [ ]      Assessment/ Plan: 80 yo M w/PMhx: CAD s/p PCI x 3 (2001x1; 2010 x2), Afib on Eliquis (last dose 8/8), HTN and HLD. Presented c/o Syncope x2 associated with stable angina and palpitations. Patient ruled in NSTEMI w/peak trop 0.15 and subsequent cardiac cath revealed 3vcad: LAD/Cirx/RCA. CT surgery consulted for possible myocardial revascularization via CABG.    -Case and plan discussed with CT surgeon Dr. Valero/Luis/Marie. Initial STS risk assessed and discussed with patient. Evaluation by full heart team pending. Attending note to follow. Pre-op for: CABG    Recommendations:  [] hold Plavix  [] hold ASA if Pre-op Cardiac Valve surgery and patient without CAD  [x] hold ACEI/ARB/CCB 24 hours prior to planned procedure   [x] LUE precaution for possible radial artery harvest      Labs:  [] CBC  [] CMP  [] PT/INR/PTT  [] BNP  [x] HgA1c  [x] Type and screen  [x] Urinalysis  [x] MRSA  [] COVID pcr    Diagnostic studies  [] CT HEAD Non-Contrast  [x] CT Chest without contrast   [x] Carotid Duplex  [x] PFT: Simple PFT [ ]  Full [x ]  [] CHICO/PVR  [x] TTE    Consultations/Evaluations   [] Renal Consult  [] Pulmonary Consult  [] Vascular Consult  [] Dental Consult   [] Hem-Onc Consult   [] GI Consult   [] Other Consultations :                 Surgeon: Dr. Valero/Luis/Marie    Consult requesting by: Dr. Grady Davis  CARD: Toby Kay ([place])  PMD:    Wife: Myra: [phone]    CC: Syncopex2 w/CP + palpitations.    HISTORY OF PRESENT ILLNESS:  Patient is a 82 yo M hx of CAD s/p PCI x 3 (2001x1; 2010 x2), Afib on Eliquis (last dose 8/8), HTN and HLD. Patient presented c/o Syncope x2 associated with chest pain and palpitations. Chest pain started  last night around 11pm, described as heavy pressure, 6/10, with radiation to left arm associated with Palpitations. Patient follows with a cardiologist in [place] and was told if this occurs he should take an extra dose of his medications so around 2am he took an extra dose of Multaq (Extra 400mg) and atenolol(extra 50mg)  and took his morning dose. However, he came to the ED today because his is still having the CP and palpitations. He also noticed that he has CP with exertion which is new for him. He normally walks a lot but has needed to stop to rest while walking up the stairs due to CP. No LE swelling. No nausea, vomiting, abdominal pain, fevers or chills. Patient ruled in NSTEMI w/peak trop 0.15 and subsequent cardiac cath revealed 3vcad. CT surgery consulted for possible myocardial revascularization via CABG.      NYHA functional class    [ ] Class I (no limitation) [ x] Class II (slight limitation) [ ] Class III (marked limitation) [ ] Class IV (symptoms at rest)    PAST MEDICAL & SURGICAL HISTORY:  CAD (coronary artery disease)  HLD (hyperlipidemia)  Hypertension        MEDICATIONS  (STANDING):  amLODIPine   Tablet 10 milliGRAM(s) Oral daily  aspirin enteric coated 81 milliGRAM(s) Oral daily  ATENolol  Tablet 25 milliGRAM(s) Oral daily  atorvastatin Oral Tab/Cap - Peds 40 milliGRAM(s) Oral at bedtime  dronedarone 400 milliGRAM(s) Oral two times a day  enoxaparin Injectable 70 milliGRAM(s) SubCutaneous two times a day  sodium chloride 0.9%. 1000 milliLiter(s) (50 mL/Hr) IV Continuous <Continuous>    Home Medications:  amLODIPine 10 mg oral tablet: 1 tab(s) orally once a day (08 Aug 2021 17:45)  atenolol 50 mg oral tablet: 1 tab(s) orally once a day (08 Aug 2021 17:45)  Eliquis 5 mg oral tablet: 1 tab(s) orally 2 times a day (08 Aug 2021 17:45)  Lipitor 40 mg oral tablet: 1 tab(s) orally once a day (08 Aug 2021 17:45)  Multaq 400 mg oral tablet: 1 tab(s) orally 2 times a day (08 Aug 2021 17:44)      Allergies    Allergy Status Unknown    Intolerances        SOCIAL HISTORY:  Smoker: [ ] Yes  [x ] No        PACK YEARS:                         WHEN QUIT?  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      Review of Systems  CONSTITUTIONAL:  Fevers[ ] chills[ ] sweats[ ] fatigue[ ] weight loss[ ] weight gain [ ]                                     NEGATIVE [X ]   NEURO:  paresthesias[ ] seizures [ ]  syncope [x ]  confusion [ ]                                                                                 EYES: glasses[ ]  blurry vision[ ]  discharge[ ] pain[ ] glaucoma [ ]                                                                          NEGATIVE[X ]   ENMT:  difficulty hearing [ ]  vertigo[ ]  dysphagia[ ] epistaxis[ ] recent dental work [ ]                                    NEGATIVE[ X]   CV:  chest pain[ ] palpitations[ ] VARGAS [x ] diaphoresis [ ]                                                                                             RESPIRATORY:  wheezing[ ] SOB[x ] cough [ ] sputum[ ] hemoptysis[ ]                                                                     GI:  nausea[ ]  vomiting [ ]  diarrhea[ ] constipation [ ] melena [ ]                                                                         NEGATIVE[ X]   : hematuria[ ]  dysuria[ ] urgency[ ] incontinence[ ]                                                                                            NEGATIVE[ X]   MUSKULOSKELETAL:  arthritis[ ]  joint swelling [ ] muscle weakness [ ] Hx vein stripping [ ]                             NEGATIVE[X ]   SKIN/BREAST:  rash[ ] itching [ ]  hair loss[ ] masses[ ]                                                                                              NEGATIVE[ X]   PSYCH:  dementia [ ] depression [ ] anxiety[ ]                                                                                                               NEGATIVE[X ]   HEME/LYMPH:  bruises easily[ ] enlarged lymph nodes[ ] tender lymph nodes[ ]                                               NEGATIVE[ X]   ENDOCRINE:  cold intolerance[ ] heat intolerance[ ] polydipsia[ ]                                                                          NEGATIVE[ X]     PHYSICAL EXAM  Vital Signs Last 24 Hrs  T(C): 35.9 (09 Aug 2021 04:43), Max: 36.2 (08 Aug 2021 20:35)  T(F): 96.7 (09 Aug 2021 04:43), Max: 97.1 (08 Aug 2021 20:35)  HR: 62 (09 Aug 2021 04:43) (55 - 62)  BP: 160/78 (09 Aug 2021 04:43) (90/54 - 160/78)  RR: 18 (09 Aug 2021 04:43) (18 - 18)  SpO2: 99% (08 Aug 2021 14:51) (99% - 99%)  Right arm bp: unable to assess 2/2 left radial cath attempt                                Left arm bp:    CONSTITUTIONAL:  WNL[x ]   Neuro: WNL [x ] Normal exam oriented to person/place & time with no focal motor or sensory  deficits. Other                     Eyes:    WNL [ x] Normal exam of conjunctiva & lids, pupils equally reactive. Other     ENT:     WNL [x ] Normal exam of nasal/oral mucosa with absence of cyanosis. Other  Neck:   WNL [x ] Normal exam of jugular veins, trachea & thyroid. Other  Chest:  Unlabored respirations wit + bibasilar crackles no rales, or rhonchi.                                                                                CV:  Auscultation: normal [ x] S3[ ] S4[ ] Irregular [ ] Rub[ ] Clicks[ ]    Murmurs none:[x ]systolic [ ]  diastolic [ ] holosystolic [ ]  Carotids: No Bruits[x ] Other                  Abdominal Aorta: normal [x ] nonpalpable[ ]Other                                                                                      GI: WNL[x ] Normal exam of abdomen, liver & spleen with no noted masses or tenderness. Other                                                                                                        Extremities: WNL[x ] Normal no evidence of cyanosis or deformity Edema: none[x ]trace[ ]1+[ ]2+[ ]3+[ ]4+[ ]  Lower Extremity Pulses: 1+ b/l dp pulses Varicosities[ ]  SKIN :WNL[x ] Normal exam to inspection & palpation. Other:                                                          LABS:                        15.0   9.59  )-----------( 161      ( 09 Aug 2021 06:25 )             43.2     08-09    138  |  104  |  13  ----------------------------<  122<H>  4.0   |  25  |  0.9    Ca    8.8      09 Aug 2021 06:25  Mg     1.8     08-08    TPro  6.6  /  Alb  4.3  /  TBili  0.6  /  DBili  x   /  AST  20  /  ALT  17  /  AlkPhos  69  08-08    PT/INR - ( 08 Aug 2021 15:14 )   PT: 17.00 sec;   INR: 1.48 ratio       PTT - ( 09 Aug 2021 06:25 )  PTT:54.8 sec    CARDIAC MARKERS ( 08 Aug 2021 19:58 )  x     / 0.15 ng/mL / x     / x     / x      CARDIAC MARKERS ( 08 Aug 2021 15:14 )  x     / 0.04 ng/mL / x     / x     / x      CARDIAC MARKERS ( 08 Aug 2021 13:23 )  x     / 0.04 ng/mL / x     / x     / x        Serum Pro-Brain Natriuretic Peptide: 1531 pg/mL (08.08.21 @ 13:23)    COVID Result: COVID-19 PCR: NotDetec (08-08-21 @ 14:08)    Cardiac Cath: FINDINGS  LEFT HEART CATHETERIZATION                                  Left main: Normal  LAD:        Prox: 80% stenosis at S1       Mid: ulcerated plaque, subsequent 90% stenosis       Dist: Mild disease  Diag: Mild disease  Left Circumflex:        Prox: Mild to moderate disease; 90% stenosis in mid segment after OM bifurcation       Dist: Moderate disease  OM: Mild disease  Right Coronary Artery: 100% stenosis at the distal edge of previous stent  RPDA: supplied by faint collaterals from LAD    SYNTAX I/II SCORE: 34    POST-OP DIAGNOSIS  Significant 3-vessel disease     CT CHEST: PENDING    TTE: PENDING    PFTs: PENDING    STS Score:  Isolated CAB  Risk of Mortality: 1.887%  Renal Failure: 1.217%  Permanent Stroke: 1.280%  Prolonged Ventilation: 7.349%  DSW Infection: 0.243%  Reoperation: 2.552%  Morbidity or Mortality: 11.287%  Short Length of Stay: 29.137%  Long Length of Stay: 5.944%    Impression:  CAD [x ]  Valvular  disease [ ]   Aortic Disease [ ]   SAULO: Yes[ ] No [ ]   CKD Stage I [ ] , Stage II [ ] , Stage III [ ], Stage IV [ ]   Anemia: Yes [ ], No [ ]  Diabetes :Yes [ ], No [ ]  Acute MI: Yes [ ], No [ ]   Heart Failure: Yes [ ] , No [ ] HFpEF [ ], HFrEF [ ]      Assessment/ Plan: 82 yo M w/PMhx: CAD s/p PCI x 3 (2001x1; 2010 x2), Afib on Eliquis (last dose 8/8), HTN and HLD. Presented c/o Syncope x2 associated with stable angina and palpitations. Patient ruled in NSTEMI w/peak trop 0.15 and subsequent cardiac cath revealed 3vcad: LAD/Cirx/RCA. CT surgery consulted for possible myocardial revascularization via CABG.    -Case and plan discussed with CT surgeon Dr. Valero/Luis/Marie. Initial STS risk assessed and discussed with patient. Evaluation by full heart team pending. Attending note to follow. Pre-op for: CABG    Recommendations:  [] hold Plavix  [] hold ASA if Pre-op Cardiac Valve surgery and patient without CAD  [x] hold ACEI/ARB/CCB 24 hours prior to planned procedure   [x] LUE precaution for possible radial artery harvest      Labs:  [] CBC  [] CMP  [] PT/INR/PTT  [] BNP  [x] HgA1c  [x] Type and screen  [x] Urinalysis  [x] MRSA  [] COVID pcr    Diagnostic studies  [] CT HEAD Non-Contrast  [x] CT Chest without contrast   [x] Carotid Duplex  [x] PFT: Simple PFT [ ]  Full [x ]  [] CHICO/PVR  [x] TTE    Consultations/Evaluations   [] Renal Consult  [] Pulmonary Consult  [] Vascular Consult  [] Dental Consult   [] Hem-Onc Consult   [] GI Consult   [] Other Consultations :    CTS ATTENDING:  pt interviewed and examined  case discussed with   Pt and family made aware of the need for CABG  procedure explained, including risks, benefits and alternatives  Pt will need carotid duplex, chest ct and pfts.  tentatively scheduled for Thursday.   stop eliquis.  -FMR

## 2021-08-10 LAB
A1C WITH ESTIMATED AVERAGE GLUCOSE RESULT: 5.6 % — SIGNIFICANT CHANGE UP (ref 4–5.6)
ALBUMIN SERPL ELPH-MCNC: 4.5 G/DL — SIGNIFICANT CHANGE UP (ref 3.5–5.2)
ALP SERPL-CCNC: 82 U/L — SIGNIFICANT CHANGE UP (ref 30–115)
ALT FLD-CCNC: 25 U/L — SIGNIFICANT CHANGE UP (ref 0–41)
ANION GAP SERPL CALC-SCNC: 10 MMOL/L — SIGNIFICANT CHANGE UP (ref 7–14)
APPEARANCE UR: CLEAR — SIGNIFICANT CHANGE UP
APTT BLD: 36.3 SEC — SIGNIFICANT CHANGE UP (ref 27–39.2)
AST SERPL-CCNC: 35 U/L — SIGNIFICANT CHANGE UP (ref 0–41)
BILIRUB SERPL-MCNC: 1 MG/DL — SIGNIFICANT CHANGE UP (ref 0.2–1.2)
BILIRUB UR-MCNC: NEGATIVE — SIGNIFICANT CHANGE UP
BUN SERPL-MCNC: 15 MG/DL — SIGNIFICANT CHANGE UP (ref 10–20)
CALCIUM SERPL-MCNC: 9.5 MG/DL — SIGNIFICANT CHANGE UP (ref 8.5–10.1)
CHLORIDE SERPL-SCNC: 102 MMOL/L — SIGNIFICANT CHANGE UP (ref 98–110)
CO2 SERPL-SCNC: 24 MMOL/L — SIGNIFICANT CHANGE UP (ref 17–32)
COLOR SPEC: SIGNIFICANT CHANGE UP
CREAT SERPL-MCNC: 0.9 MG/DL — SIGNIFICANT CHANGE UP (ref 0.7–1.5)
DIFF PNL FLD: NEGATIVE — SIGNIFICANT CHANGE UP
ESTIMATED AVERAGE GLUCOSE: 114 MG/DL — SIGNIFICANT CHANGE UP (ref 68–114)
GLUCOSE SERPL-MCNC: 139 MG/DL — HIGH (ref 70–99)
GLUCOSE UR QL: NEGATIVE — SIGNIFICANT CHANGE UP
HCT VFR BLD CALC: 46.9 % — SIGNIFICANT CHANGE UP (ref 42–52)
HGB BLD-MCNC: 16.2 G/DL — SIGNIFICANT CHANGE UP (ref 14–18)
KETONES UR-MCNC: NEGATIVE — SIGNIFICANT CHANGE UP
LEUKOCYTE ESTERASE UR-ACNC: NEGATIVE — SIGNIFICANT CHANGE UP
MAGNESIUM SERPL-MCNC: 2.1 MG/DL — SIGNIFICANT CHANGE UP (ref 1.8–2.4)
MCHC RBC-ENTMCNC: 31.7 PG — HIGH (ref 27–31)
MCHC RBC-ENTMCNC: 34.5 G/DL — SIGNIFICANT CHANGE UP (ref 32–37)
MCV RBC AUTO: 91.8 FL — SIGNIFICANT CHANGE UP (ref 80–94)
MRSA PCR RESULT.: NEGATIVE — SIGNIFICANT CHANGE UP
NITRITE UR-MCNC: NEGATIVE — SIGNIFICANT CHANGE UP
NRBC # BLD: 0 /100 WBCS — SIGNIFICANT CHANGE UP (ref 0–0)
PH UR: 7 — SIGNIFICANT CHANGE UP (ref 5–8)
PLATELET # BLD AUTO: 172 K/UL — SIGNIFICANT CHANGE UP (ref 130–400)
POTASSIUM SERPL-MCNC: 4.3 MMOL/L — SIGNIFICANT CHANGE UP (ref 3.5–5)
POTASSIUM SERPL-SCNC: 4.3 MMOL/L — SIGNIFICANT CHANGE UP (ref 3.5–5)
PROT SERPL-MCNC: 7 G/DL — SIGNIFICANT CHANGE UP (ref 6–8)
PROT UR-MCNC: NEGATIVE — SIGNIFICANT CHANGE UP
RBC # BLD: 5.11 M/UL — SIGNIFICANT CHANGE UP (ref 4.7–6.1)
RBC # FLD: 11.3 % — LOW (ref 11.5–14.5)
SODIUM SERPL-SCNC: 136 MMOL/L — SIGNIFICANT CHANGE UP (ref 135–146)
SP GR SPEC: 1.01 — SIGNIFICANT CHANGE UP (ref 1.01–1.03)
UROBILINOGEN FLD QL: SIGNIFICANT CHANGE UP
WBC # BLD: 8.25 K/UL — SIGNIFICANT CHANGE UP (ref 4.8–10.8)
WBC # FLD AUTO: 8.25 K/UL — SIGNIFICANT CHANGE UP (ref 4.8–10.8)

## 2021-08-10 PROCEDURE — 99232 SBSQ HOSP IP/OBS MODERATE 35: CPT

## 2021-08-10 PROCEDURE — 99233 SBSQ HOSP IP/OBS HIGH 50: CPT

## 2021-08-10 PROCEDURE — 93010 ELECTROCARDIOGRAM REPORT: CPT

## 2021-08-10 RX ORDER — PANTOPRAZOLE SODIUM 20 MG/1
40 TABLET, DELAYED RELEASE ORAL
Refills: 0 | Status: DISCONTINUED | OUTPATIENT
Start: 2021-08-10 | End: 2021-08-12

## 2021-08-10 RX ADMIN — ENOXAPARIN SODIUM 70 MILLIGRAM(S): 100 INJECTION SUBCUTANEOUS at 17:11

## 2021-08-10 RX ADMIN — DRONEDARONE 400 MILLIGRAM(S): 400 TABLET, FILM COATED ORAL at 05:32

## 2021-08-10 RX ADMIN — ENOXAPARIN SODIUM 70 MILLIGRAM(S): 100 INJECTION SUBCUTANEOUS at 05:32

## 2021-08-10 RX ADMIN — AMLODIPINE BESYLATE 10 MILLIGRAM(S): 2.5 TABLET ORAL at 05:32

## 2021-08-10 RX ADMIN — ATENOLOL 25 MILLIGRAM(S): 25 TABLET ORAL at 05:32

## 2021-08-10 RX ADMIN — ATORVASTATIN CALCIUM 40 MILLIGRAM(S): 80 TABLET, FILM COATED ORAL at 21:44

## 2021-08-10 RX ADMIN — Medication 81 MILLIGRAM(S): at 11:25

## 2021-08-10 RX ADMIN — DRONEDARONE 400 MILLIGRAM(S): 400 TABLET, FILM COATED ORAL at 17:12

## 2021-08-10 NOTE — PROGRESS NOTE ADULT - SUBJECTIVE AND OBJECTIVE BOX
RADHA BAKER 81y Male  MRN#: [id number]   CODE STATUS: full    Hospital Day: 2d    Pt is currently admitted with the primary diagnosis of ACS, NSTEMI, multivessel disease    SUBJECTIVE  Hospital Course    Overnight events: No acute events overnight. Patient denies chest pain, shortness of breath, abdominal pain.     Subjective complaints     Present Today:   - Vazquez:  No [X  ], Yes [   ] : Indication:     - Type of IV Access:       .. CVC/Piccline:  No [ X ], Yes [   ] : Indication:       .. Midline: No [ X ], Yes [   ] : Indication:                                             ----------------------------------------------------------  OBJECTIVE  PAST MEDICAL & SURGICAL HISTORY  CAD (coronary artery disease)    HLD (hyperlipidemia)    Hypertension                                              -----------------------------------------------------------  ALLERGIES:  Allergy Status Unknown                                            ------------------------------------------------------------    HOME MEDICATIONS  Home Medications:  amLODIPine 10 mg oral tablet: 1 tab(s) orally once a day (08 Aug 2021 17:45)  atenolol 50 mg oral tablet: 1 tab(s) orally once a day (08 Aug 2021 17:45)  Eliquis 5 mg oral tablet: 1 tab(s) orally 2 times a day (08 Aug 2021 17:45)  Lipitor 40 mg oral tablet: 1 tab(s) orally once a day (08 Aug 2021 17:45)  Multaq 400 mg oral tablet: 1 tab(s) orally 2 times a day (08 Aug 2021 17:44)                           MEDICATIONS:  STANDING MEDICATIONS  amLODIPine   Tablet 10 milliGRAM(s) Oral daily  aspirin enteric coated 81 milliGRAM(s) Oral daily  ATENolol  Tablet 25 milliGRAM(s) Oral daily  atorvastatin Oral Tab/Cap - Peds 40 milliGRAM(s) Oral at bedtime  dronedarone 400 milliGRAM(s) Oral two times a day  enoxaparin Injectable 70 milliGRAM(s) SubCutaneous two times a day  sodium chloride 0.9%. 1000 milliLiter(s) IV Continuous <Continuous>    PRN MEDICATIONS                                            ------------------------------------------------------------  VITAL SIGNS: Last 24 Hours  T(C): 36.7 (10 Aug 2021 05:52), Max: 36.7 (09 Aug 2021 20:50)  T(F): 98 (10 Aug 2021 05:52), Max: 98.1 (09 Aug 2021 20:50)  HR: 88 (10 Aug 2021 05:52) (50 - 88)  BP: 144/74 (10 Aug 2021 05:52) (123/62 - 144/74)  BP(mean): --  RR: 18 (10 Aug 2021 05:52) (18 - 18)  SpO2: 96% (09 Aug 2021 10:07) (96% - 96%)      08-08-21 @ 07:01  -  08-09-21 @ 07:00  --------------------------------------------------------  IN: 72 mL / OUT: 0 mL / NET: 72 mL    08-09-21 @ 07:01  -  08-10-21 @ 06:37  --------------------------------------------------------  IN: 350 mL / OUT: 425 mL / NET: -75 mL                                             --------------------------------------------------------------  LABS:                        15.0   9.59  )-----------( 161      ( 09 Aug 2021 06:25 )             43.2     08-09    138  |  104  |  13  ----------------------------<  122<H>  4.0   |  25  |  0.9    Ca    8.8      09 Aug 2021 06:25  Mg     1.8     08-08    TPro  6.6  /  Alb  4.3  /  TBili  0.6  /  DBili  x   /  AST  20  /  ALT  17  /  AlkPhos  69  08-08    PT/INR - ( 08 Aug 2021 15:14 )   PT: 17.00 sec;   INR: 1.48 ratio         PTT - ( 09 Aug 2021 06:25 )  PTT:54.8 sec              CARDIAC MARKERS ( 08 Aug 2021 19:58 )  x     / 0.15 ng/mL / x     / x     / x      CARDIAC MARKERS ( 08 Aug 2021 15:14 )  x     / 0.04 ng/mL / x     / x     / x      CARDIAC MARKERS ( 08 Aug 2021 13:23 )  x     / 0.04 ng/mL / x     / x     / x                                                  -------------------------------------------------------------  RADIOLOGY:                                            --------------------------------------------------------------    PHYSICAL EXAM:  General: Well appearing, comfortable, not in acute distress  LUNGS: CTAB, no wheeze, on RA  HEART: RRR, no murmurs  ABDOMEN: NBS, soft, nontender to palpation  EXT: no peripheral pitting edema                                           --------------------------------------------------------------         RADHA ABKER 81y Male  MRN#: [id number]   CODE STATUS: full    Hospital Day: 2d    Pt is currently admitted with the primary diagnosis of ACS, NSTEMI, multivessel disease    SUBJECTIVE  Hospital Course    Overnight events: No acute events overnight. Patient denies chest pain, shortness of breath, abdominal pain.     Subjective complaints     Present Today:   - Vazquez:  No [X  ], Yes [   ] : Indication:     - Type of IV Access:       .. CVC/Piccline:  No [ X ], Yes [   ] : Indication:       .. Midline: No [ X ], Yes [   ] : Indication:                                             ----------------------------------------------------------  OBJECTIVE  PAST MEDICAL & SURGICAL HISTORY  CAD (coronary artery disease)    HLD (hyperlipidemia)    Hypertension                                              -----------------------------------------------------------  ALLERGIES:  Allergy Status Unknown                                            ------------------------------------------------------------    HOME MEDICATIONS  Home Medications:  amLODIPine 10 mg oral tablet: 1 tab(s) orally once a day (08 Aug 2021 17:45)  atenolol 50 mg oral tablet: 1 tab(s) orally once a day (08 Aug 2021 17:45)  Eliquis 5 mg oral tablet: 1 tab(s) orally 2 times a day (08 Aug 2021 17:45)  Lipitor 40 mg oral tablet: 1 tab(s) orally once a day (08 Aug 2021 17:45)  Multaq 400 mg oral tablet: 1 tab(s) orally 2 times a day (08 Aug 2021 17:44)                           MEDICATIONS:  STANDING MEDICATIONS  amLODIPine   Tablet 10 milliGRAM(s) Oral daily  aspirin enteric coated 81 milliGRAM(s) Oral daily  ATENolol  Tablet 25 milliGRAM(s) Oral daily  atorvastatin Oral Tab/Cap - Peds 40 milliGRAM(s) Oral at bedtime  dronedarone 400 milliGRAM(s) Oral two times a day  enoxaparin Injectable 70 milliGRAM(s) SubCutaneous two times a day  sodium chloride 0.9%. 1000 milliLiter(s) IV Continuous <Continuous>    PRN MEDICATIONS                                            ------------------------------------------------------------  VITAL SIGNS: Last 24 Hours  T(C): 36.7 (10 Aug 2021 05:52), Max: 36.7 (09 Aug 2021 20:50)  T(F): 98 (10 Aug 2021 05:52), Max: 98.1 (09 Aug 2021 20:50)  HR: 88 (10 Aug 2021 05:52) (50 - 88)  BP: 144/74 (10 Aug 2021 05:52) (123/62 - 144/74)  BP(mean): --  RR: 18 (10 Aug 2021 05:52) (18 - 18)  SpO2: 96% (09 Aug 2021 10:07) (96% - 96%)      08-08-21 @ 07:01  -  08-09-21 @ 07:00  --------------------------------------------------------  IN: 72 mL / OUT: 0 mL / NET: 72 mL    08-09-21 @ 07:01  -  08-10-21 @ 06:37  --------------------------------------------------------  IN: 350 mL / OUT: 425 mL / NET: -75 mL                                             --------------------------------------------------------------  LABS:                        15.0   9.59  )-----------( 161      ( 09 Aug 2021 06:25 )             43.2     08-09    138  |  104  |  13  ----------------------------<  122<H>  4.0   |  25  |  0.9    Ca    8.8      09 Aug 2021 06:25  Mg     1.8     08-08    TPro  6.6  /  Alb  4.3  /  TBili  0.6  /  DBili  x   /  AST  20  /  ALT  17  /  AlkPhos  69  08-08    PT/INR - ( 08 Aug 2021 15:14 )   PT: 17.00 sec;   INR: 1.48 ratio         PTT - ( 09 Aug 2021 06:25 )  PTT:54.8 sec              CARDIAC MARKERS ( 08 Aug 2021 19:58 )  x     / 0.15 ng/mL / x     / x     / x      CARDIAC MARKERS ( 08 Aug 2021 15:14 )  x     / 0.04 ng/mL / x     / x     / x      CARDIAC MARKERS ( 08 Aug 2021 13:23 )  x     / 0.04 ng/mL / x     / x     / x                                                  -------------------------------------------------------------  RADIOLOGY:  < from: TTE Echo Complete w/o Contrast w/ Doppler (08.09.21 @ 14:14) >  Summary:   1. Normal global left ventricular systolic function.   2. LV Ejection Fraction by Maldonado's Method with a biplane EF of 62 %.   3. Normal left ventricular internal cavity size.   4. The mean global longitudinal peak strain by speckle tracking is -17.8% which is borderline normal.   5. Normal left atrial size.   6. Normal right atrial size.   7. No evidence of mitral valve regurgitation.   8. Mild tricuspid regurgitation.   9. Sclerotic aortic valve with normal opening.  10. Mild pulmonic valve regurgitation.  11. Estimated pulmonary artery systolic pressure is 37.8 mmHg assuming a right atrial pressure of 8 mmHg, which is consistent with borderline pulmonary hypertension.  12. LA volume Index is 25.7 ml/m² ml/m2.    < end of copied text >                                            --------------------------------------------------------------    PHYSICAL EXAM:  General: Well appearing, comfortable, not in acute distress  LUNGS: CTAB, no wheeze, on RA  HEART: RRR, no murmurs  ABDOMEN: NBS, soft, nontender to palpation  EXT: no peripheral pitting edema                                           --------------------------------------------------------------

## 2021-08-10 NOTE — PROGRESS NOTE ADULT - ASSESSMENT
ASSESSMENT & PLAN    Patient is a 82 yo M hx of CAD s/p PCI x 3 last 2010, Afib on Eliquis and HLD presented with chest pain found to have NSTEMI, found to have severe multivessel disease on cath (LAD 80% stenosis in L1, Mid 90% stenosis, left circumflex and ptox to have 90% stenosis in mid sigment after OM bifurcation, and % stenosis) awaiting CABG on therapeutic lovenox.     #NSTEMI 2/2 multivessel CAD  -f/u CT surgery recs. Per CT surgry, will hold ACEI/ARB/CCB 24 hours prior to planned procedure, LUE precaution for possible radial artery harvest  - c/w Lovenox 70mg BID until 24 hours prior to surgery to switch back to heparin  Troponin: 0.4 x2, then increased to 0.15  EKG with AFIB, no ST changes     Sublingual Nitro for continued CP     #Paroxysmal Atrial Fibrillation  EKG on admission with AFIB  Eliquis Switched to Heparin drip then now on therapeutic lovenox per ACS protocol   Rate control with Multaq 400mg BID and Atenolol 40mg 25mg QD    #HTN:  - c/w Amlodipine, Atenolol and Statin. No plan for surgery tomorrow    #HLD  -atorvastatin 40mg QD      DVT PPX: Therapeutic lovenox  GI PPX: not indicated  Full Code  Dispo: acute. to CT surgery     ASSESSMENT & PLAN    Patient is a 82 yo M hx of CAD s/p PCI x 3 last 2010, Afib on Eliquis and HLD presented with chest pain found to have NSTEMI, found to have severe multivessel disease on cath (LAD 80% stenosis in L1, Mid 90% stenosis, left circumflex and ptox to have 90% stenosis in mid sigment after OM bifurcation, and % stenosis) awaiting CABG on therapeutic lovenox.     #NSTEMI 2/2 multivessel CAD  -f/u CT surgery recs. Per CT surgry, will hold ACEI/ARB/CCB 24 hours prior to planned procedure, LUE precaution for possible radial artery harvest  - c/w Lovenox 70mg BID until 24 hours prior to surgery to switch back to heparin  -TTE on 8/9 showed EF: 62%  Troponin: 0.4 x2, then increased to 0.15  EKG with AFIB, no ST changes   Sublingual Nitro for continued CP     #Paroxysmal Atrial Fibrillation  EKG on admission with AFIB  Eliquis Switched to Heparin drip then now on therapeutic lovenox per ACS protocol   Rate control with Multaq 400mg BID and Atenolol 40mg 25mg QD    #HTN:  - c/w Amlodipine, Atenolol and Statin. No plan for surgery tomorrow    #HLD  -atorvastatin 40mg QD      DVT PPX: Therapeutic lovenox  GI PPX: not indicated  Full Code  Dispo: acute. to CT surgery     ASSESSMENT & PLAN    Patient is a 82 yo M hx of CAD s/p PCI x 3 last 2010, Afib on Eliquis and HLD presented with chest pain found to have NSTEMI, found to have severe multivessel disease on cath (LAD 80% stenosis in L1, Mid 90% stenosis, left circumflex and ptox to have 90% stenosis in mid sigment after OM bifurcation, and % stenosis) awaiting CABG on therapeutic lovenox.     #NSTEMI 2/2 multivessel CAD  -f/u CT surgery recs. Per CT surgry, will hold ACEI/ARB/CCB 24 hours prior to planned procedure, LUE precaution for possible radial artery harvest  - c/w Lovenox 70mg BID until 24 hours prior to surgery. Hold lovenox starting 8/11  -TTE on 8/9 showed EF: 62%  Troponin: 0.4 x2, then increased to 0.15  EKG with AFIB, no ST changes   Sublingual Nitro for continued CP     #Paroxysmal Atrial Fibrillation  EKG on admission with AFIB  Eliquis Switched to Heparin drip then now on therapeutic lovenox. Hold lovenox starting 8/11  Rate control with Multaq 400mg BID and Atenolol 40mg 25mg QD    #HTN:  - c/w Amlodipine, Atenolol and Statin. Hold amlodipine starting 8/11.    #HLD  -atorvastatin 40mg QD      DVT PPX: Therapeutic lovenox  GI PPX: pantoprazole BID  Full Code  Dispo: acute. to CT surgery

## 2021-08-10 NOTE — PROGRESS NOTE ADULT - ASSESSMENT
3 vessel CAD  NSTEMI  PAF - now in NSR  HTN, DL      C/w ASA, Lovenox full dose, statin, BB  C/w Multaq for AF prevention/rhythm control  CT surgery follow-up.  CT chest, echo noted  F/u Doppler  Plan for CABG this week

## 2021-08-10 NOTE — PROGRESS NOTE ADULT - SUBJECTIVE AND OBJECTIVE BOX
Patient is a 81y old  Male who presents with a chief complaint of Chest pain (10 Aug 2021 06:36)          SUBJ:  Patient seen and examined. No chest pain today. Remains in NSR.      MEDICATIONS  (STANDING):  amLODIPine   Tablet 10 milliGRAM(s) Oral daily  aspirin enteric coated 81 milliGRAM(s) Oral daily  ATENolol  Tablet 25 milliGRAM(s) Oral daily  atorvastatin Oral Tab/Cap - Peds 40 milliGRAM(s) Oral at bedtime  dronedarone 400 milliGRAM(s) Oral two times a day  enoxaparin Injectable 70 milliGRAM(s) SubCutaneous two times a day  sodium chloride 0.9%. 1000 milliLiter(s) (50 mL/Hr) IV Continuous <Continuous>    MEDICATIONS  (PRN):            Vital Signs Last 24 Hrs  T(C): 36.7 (10 Aug 2021 05:52), Max: 36.7 (09 Aug 2021 20:50)  T(F): 98 (10 Aug 2021 05:52), Max: 98.1 (09 Aug 2021 20:50)  HR: 88 (10 Aug 2021 05:52) (50 - 88)  BP: 144/74 (10 Aug 2021 05:52) (123/62 - 144/74)  BP(mean): --  RR: 18 (10 Aug 2021 05:52) (18 - 18)  SpO2: 96% (09 Aug 2021 10:07) (96% - 96%)      PHYSICAL EXAM:    GEN: AAO x 3, NAD  HEENT: NC/AT, PERRL  Neck: No JVD, no bruits  CV: Reg, S1-S2, no murmur  Lungs: CTAB  Abd: Soft, non-tender  Ext: No edema        08-09-21 @ 07:01  -  08-10-21 @ 07:00  --------------------------------------------------------  IN: 350 mL / OUT: 425 mL / NET: -75 mL        I&O's Summary    09 Aug 2021 07:01  -  10 Aug 2021 07:00  --------------------------------------------------------  IN: 350 mL / OUT: 425 mL / NET: -75 mL    	    TELEMETRY:  NSR  ECG:    TTE:  < from: TTE Echo Complete w/o Contrast w/ Doppler (08.09.21 @ 14:14) >  Summary:   1. Normal global left ventricular systolic function.   2. LV Ejection Fraction by Maldonado's Method with a biplane EF of 62 %.   3. Normal left ventricular internal cavity size.   4. The mean global longitudinal peak strain by speckle tracking is -17.8% which is borderline normal.   5. Normal left atrial size.   6. Normal right atrial size.   7. No evidence of mitral valve regurgitation.   8. Mild tricuspid regurgitation.   9. Sclerotic aortic valve with normal opening.  10. Mild pulmonic valve regurgitation.  11. Estimated pulmonary artery systolic pressure is 37.8 mmHg assuming a right atrial pressure of 8 mmHg, which is consistent with borderline pulmonary hypertension.  12. LA volume Index is 25.7 ml/m² ml/m2.    < end of copied text >          LABS:                        15.0   9.59  )-----------( 161      ( 09 Aug 2021 06:25 )             43.2     08-09    138  |  104  |  13  ----------------------------<  122<H>  4.0   |  25  |  0.9    Ca    8.8      09 Aug 2021 06:25  Mg     1.8     08-08    TPro  6.6  /  Alb  4.3  /  TBili  0.6  /  DBili  x   /  AST  20  /  ALT  17  /  AlkPhos  69  08-08    CARDIAC MARKERS ( 08 Aug 2021 19:58 )  x     / 0.15 ng/mL / x     / x     / x      CARDIAC MARKERS ( 08 Aug 2021 15:14 )  x     / 0.04 ng/mL / x     / x     / x      CARDIAC MARKERS ( 08 Aug 2021 13:23 )  x     / 0.04 ng/mL / x     / x     / x          PT/INR - ( 08 Aug 2021 15:14 )   PT: 17.00 sec;   INR: 1.48 ratio         PTT - ( 09 Aug 2021 06:25 )  PTT:54.8 sec      BNP  RADIOLOGY & ADDITIONAL STUDIES:      IMPRESSION AND PLAN:

## 2021-08-10 NOTE — PROGRESS NOTE ADULT - SUBJECTIVE AND OBJECTIVE BOX
Cardiac Surgery Pre-op Note: PLANNED OPERATIVE PROCEDURE(s):   CABG             HD # 2                       SURGEON: ABHIJIT Smith  Consult requesting by: Dr. Grady Davis  CARD: Toby Kay ([place])  PMD:    Wife: Myra: [phone]    CC: Syncopex2 w/CP + palpitations.    HISTORY OF PRESENT ILLNESS:  Patient is a 80 yo M hx of CAD s/p PCI x 3 (2001x1; 2010 x2), Afib on Eliquis (last dose 8/8), HTN and HLD. Patient presented c/o Syncope x2 associated with chest pain and palpitations. Chest pain started  last night around 11pm, described as heavy pressure, 6/10, with radiation to left arm associated with Palpitations. Patient follows with a cardiologist in [place] and was told if this occurs he should take an extra dose of his medications so around 2am he took an extra dose of Multaq (Extra 400mg) and atenolol(extra 50mg)  and took his morning dose. However, he came to the ED today because his is still having the CP and palpitations. He also noticed that he has CP with exertion which is new for him. He normally walks a lot but has needed to stop to rest while walking up the stairs due to CP. No LE swelling. No nausea, vomiting, abdominal pain, fevers or chills. Patient ruled in NSTEMI w/peak trop 0.15 and subsequent cardiac cath revealed 3vcad. CT surgery consulted for possible myocardial revascularization via CABG.    PAST MEDICAL & SURGICAL HISTORY:  CAD (coronary artery disease)  HLD (hyperlipidemia)  Hypertension    SUBJECTIVE ASSESSMENT:81y Male patient seen and examined at bedside.    Vital Signs Last 24 Hrs  T(F): 98 (10 Aug 2021 05:52), Max: 98.1 (09 Aug 2021 20:50)  HR: 88 (10 Aug 2021 05:52) (50 - 88)  BP: 144/74 (10 Aug 2021 05:52) (123/62 - 144/74)  RUE BP:                        LUE BP:  RR: 18 (10 Aug 2021 05:52) (18 - 18)  SpO2: 96% (09 Aug 2021 10:07) (96% - 96%)    I&O's Detail    09 Aug 2021 07:01  -  10 Aug 2021 07:00  --------------------------------------------------------  IN:    sodium chloride 0.9%: 350 mL  Total IN: 350 mL    OUT:    Voided (mL): 425 mL  Total OUT: 425 mL    Net: I&O's Detail    08 Aug 2021 07:01  -  09 Aug 2021 07:00  --------------------------------------------------------  Total NET: 72 mL    09 Aug 2021 07:01  -  10 Aug 2021 07:00  --------------------------------------------------------  Total NET: -75 mL    Physical Exam:  General: NAD; A&Ox3  Cardiac: S1/S2, RRR, no murmur, no rubs  Lungs: unlabored respirations, CTA b/l, no wheeze, no rales, no crackles  Abdomen: Soft/NT/ND; positive bowel sounds x 4  Extremities: No edema b/l lower extremities; good capillary refill; no cyanosis; palpable 1+ pedal pulses b/l      LABS:                        16.2   8.25  )-----------( 172      ( 10 Aug 2021 07:56 )             46.9                         15.0   9.59  )-----------( 161      ( 09 Aug 2021 06:25 )             43.2     08-09    138  |  104  |  13  ----------------------------<  122<H>  4.0   |  25  |  0.9  08-08    134<L>  |  98  |  15  ----------------------------<  145<H>  4.4   |  25  |  1.1    Ca    8.8      09 Aug 2021 06:25  Mg     1.8     08-08    TPro  6.6 [6.0 - 8.0]  /  Alb  4.3 [3.5 - 5.2]  /  TBili  0.6 [0.2 - 1.2]  /  DBili  x   /  AST  20 [0 - 41]  /  ALT  17 [0 - 41]  /  AlkPhos  69 [30 - 115]  08-08    PT/INR - ( 08 Aug 2021 15:14 )   PT: ;   INR: 1.48 ratio       PTT - ( 09 Aug 2021 06:25 )  PTT:54.8 sec, PTT - ( 09 Aug 2021 00:34 )  PTT:62.0 sec    CARDIAC MARKERS ( 08 Aug 2021 19:58 )  x     / 0.15 ng/mL / x     / x     / x      CARDIAC MARKERS ( 08 Aug 2021 15:14 )  x     / 0.04 ng/mL / x     / x     / x      CARDIAC MARKERS ( 08 Aug 2021 13:23 )  x     / 0.04 ng/mL / x     / x     / x        HGB A1C: PENDING    Pro-BNP: Serum Pro-Brain Natriuretic Peptide: 1531 pg/mL (08-08 @ 13:23)    MRSA: PENDING    COVID result: COVID-19 PCR: NotDetec (08-08-21 @ 14:08)      RADIOLOGY & ADDITIONAL TESTS:    CXR: < from: Xray Chest 2 Views PA/Lat (08.08.21 @ 14:14) >  Impression: No radiographic evidence of acute cardiopulmonary disease.  < end of copied text >    EKG: < from: 12 Lead ECG (08.09.21 @ 10:18) >  Ventricular Rate 53 BPM  Atrial Rate 53 BPM  P-R Interval 148 ms  QRS Duration 98 ms  Q-T Interval 488 ms  QTC Calculation(Bazett) 457 ms  P Axis 62 degrees  R Axis -1 degrees  T Axis 72 degrees  Diagnosis Line Sinus bradycardia  Possible Left atrial enlargement  Minimal voltage criteria for LVH, may be normal variant ( Tyron product )  Borderline ECG  Confirmed by Manjinder Bowen ([id number]) on 8/9/2021 11:01:01 AM  < end of copied text >    Carotid Duplex:  PENDING REPORT    PFT's: PENDING    Echocardiogram: < from: TTE Echo Complete w/o Contrast w/ Doppler (08.09.21 @ 14:14) >  Summary:   1. Normal global left ventricular systolic function.   2. LV Ejection Fraction by Maldonado's Method with a biplane EF of 62 %.   3. Normal left ventricular internal cavity size.   4. The mean global longitudinal peak strain by speckle tracking is -17.8% which is borderline normal.   5. Normal left atrial size.   6. Normal right atrial size.   7. No evidence of mitral valve regurgitation.   8. Mild tricuspid regurgitation.   9. Sclerotic aortic valve with normal opening.  10. Mild pulmonic valve regurgitation.  11. Estimated pulmonary artery systolic pressure is 37.8 mmHg assuming a right atrial pressure of 8 mmHg, which is consistent with borderline pulmonary hypertension.  12. LA volume Index is 25.7 ml/m² ml/m2.    PHYSICIAN INTERPRETATION:  Left Ventricle: Normal left ventricular size and wall thicknesses, with normal systolic and diastolic function. The left ventricular internal cavity size is normal. Left ventricular wall thickness is normal. There is no left ventricular hypertrophy. Global LV systolic function was normal. Spectral Doppler shows normal pattern of LV diastolic filling. Normal LV filling pressures. The mean global longitudinal peak strain by speckle tracking is -17.8% which is borderline normal.      LV Wall Scoring:  All segments are normal.    Right Ventricle: Normal right ventricular size and function. TV S' 0.1 m/s.  Left Atrium: Normal left atrial size. LA volume Index is 25.7 ml/m² ml/m2.  Right Atrium: Normal right atrial size.  Pericardium: There is no evidence of pericardial effusion.  Mitral Valve: Structurally normal mitral valve, with normal leaflet excursion. No evidence of mitral valve regurgitation is seen.  Tricuspid Valve: Structurally normal tricuspid valve, with normal leaflet excursion. Mild tricuspid regurgitation is visualized. Estimated pulmonary artery systolic pressure is 37.8 mmHg assuming a right atrial pressure of 8 mmHg, which is consistent with borderline pulmonary hypertension.  Aortic Valve: The aortic valve is trileaflet. Sclerotic aortic valve with normal opening. No evidence of aortic valve regurgitation is seen.  Pulmonic Valve: Structurally normal pulmonic valve, with normal leaflet excursion. Mild pulmonic valve regurgitation.  Aorta: The aortic root and ascending aorta are structurally normal, with no evidence of dilitation.  Pulmonary Artery: The main pulmonary artery is normal in size.  Venous: The inferior vena cava was normal sized, with respiratory size variation less than 50%.    < end of copied text >    Cardiac catheterization:  Cardiac Cath: FINDINGS  LEFT HEART CATHETERIZATION                                  Left main: Normal  LAD:        Prox: 80% stenosis at S1       Mid: ulcerated plaque, subsequent 90% stenosis       Dist: Mild disease  Diag: Mild disease  Left Circumflex:        Prox: Mild to moderate disease; 90% stenosis in mid segment after OM bifurcation       Dist: Moderate disease  OM: Mild disease  Right Coronary Artery: 100% stenosis at the distal edge of previous stent  RPDA: supplied by faint collaterals from LAD    SYNTAX I/II SCORE: 34    POST-OP DIAGNOSIS  Significant 3-vessel disease    CT CHEST: < from: CT Chest No Cont (08.09.21 @ 11:07) >  IMPRESSION:  Severe coronary artery calcifications.  Minimal calcification ascending aorta.  Moderate calcification aortic arch.  Minimal to moderate calcification, descending aorta. Great vessels within normal limits in size.  No suspicious mediastinal, lung parenchymal or pleural lesions.  < end of copied text >      Allergies  Allergy Status Unknown  Intolerances      MEDICATIONS  (STANDING):  amLODIPine   Tablet 10 milliGRAM(s) Oral daily  aspirin enteric coated 81 milliGRAM(s) Oral daily  ATENolol  Tablet 25 milliGRAM(s) Oral daily  atorvastatin Oral Tab/Cap - Peds 40 milliGRAM(s) Oral at bedtime  dronedarone 400 milliGRAM(s) Oral two times a day  enoxaparin Injectable 70 milliGRAM(s) SubCutaneous two times a day  sodium chloride 0.9%. 1000 milliLiter(s) (50 mL/Hr) IV Continuous <Continuous>      Pre-op ACEi/ARB/CCB held 24 hours prior to planned procedure: [x] Yes, [] NO: indication:  Pre-Op Beta-Blockers: [x]Yes, []No: contraindication:  Pre-Op Aspirin: [x]Yes,  []No: contraindication: [] Held for Pre-op cardiac valve surgery with no CAD  Pre-Op Statin: [x]Yes, []No: contraindication:      Ambulation/Activity Status:   5meter walk test: T1:   5.5   s/T2: 5.5    s/T3:  5.5   s        Cardiac Surgery Risk Factors  CVA and/or carotid/cerebrovascular disease. Yes  No  Explain if Yes  Aortoiliac disease Yes  No  Explain if Yes  Previous MI Yes  No  Explain if Yes  Previous Cardiac Surgery Yes  No  Explain if Yes  Hemodynamics-Unstable or Shock Yes  No  Explain if Yes  Diabetes Yes  No  Explain if Yes  Hepatic Failure Yes  No  Explain if Yes  Renal failure and/or dialysis Yes  No  Explain if Yes  Heart failure-type-present or past Yes  No  Explain if Yes  COPD Yes  No  Explain if Yes  Immune System Deficiency Yes  No  Explain if Yes  Malignant Ventricular Arrhythmia Yes  No  Explain if Yes    STS Score:  Isolated CAB  Risk of Mortality: 1.887%  Renal Failure: 1.217%  Permanent Stroke: 1.280%  Prolonged Ventilation: 7.349%  DSW Infection: 0.243%  Reoperation: 2.552%  Morbidity or Mortality: 11.287%  Short Length of Stay: 29.137%  Long Length of Stay: 5.944%      Assessment/Plan:  80 yo M w/PMhx: CAD s/p PCI x 3 (2001x1; 2010 x2), Afib on Eliquis (last dose 8/8), HTN and HLD. Presented c/o Syncope x2 associated with stable angina and palpitations. Patient ruled in NSTEMI w/peak trop 0.15 and subsequent cardiac cath revealed 3vcad: LAD/Cirx/RCA. CT surgery consulted for possible myocardial revascularization via CABG.  - Case and plan discussed with CT Surgeon - Dr. Smith  - Continue supportive care.    - Continue DVT/GI prophylaxis  - Incentive Spirometry 10 times an hour  - Continue to advance physical activity as tolerated and continue PT/OT as directed  1. CAD pre-op cabg: Hx of PCI x3: Continue ASA, statin, BB  2. HTN: cont CCB; please hold 24 hours prior to planned CABG to prevent lo-operative hypotension.   3. Paroxysmal A. Fib: cont bb; cont to hold eliquis for planned CABG. Cont lovenox BID for a/c and hold starting 8/11.   4. Pre-op CABG: pre-op pending MRSA/UA/A1c/PFTs and pt will need repeat COVID in AM.        Cardiac Surgery Pre-op Note: PLANNED OPERATIVE PROCEDURE(s):   CABG             HD # 2                       SURGEON: ABHIJIT Smith  Consult requesting by: Dr. Grady Davis  CARD: Toby Kay ([place])  PMD:    Wife: Myra: [phone]    CC: Syncopex2 w/CP + palpitations.    HISTORY OF PRESENT ILLNESS:  Patient is a 82 yo M hx of CAD s/p PCI x 3 (2001x1; 2010 x2), Afib on Eliquis (last dose 8/8), HTN and HLD. Patient presented c/o Syncope x2 associated with chest pain and palpitations. Chest pain started  last night around 11pm, described as heavy pressure, 6/10, with radiation to left arm associated with Palpitations. Patient follows with a cardiologist in [place] and was told if this occurs he should take an extra dose of his medications so around 2am he took an extra dose of Multaq (Extra 400mg) and atenolol(extra 50mg)  and took his morning dose. However, he came to the ED today because his is still having the CP and palpitations. He also noticed that he has CP with exertion which is new for him. He normally walks a lot but has needed to stop to rest while walking up the stairs due to CP. No LE swelling. No nausea, vomiting, abdominal pain, fevers or chills. Patient ruled in NSTEMI w/peak trop 0.15 and subsequent cardiac cath revealed 3vcad. CT surgery consulted for possible myocardial revascularization via CABG.    PAST MEDICAL & SURGICAL HISTORY:  CAD (coronary artery disease)  HLD (hyperlipidemia)  Hypertension    SUBJECTIVE ASSESSMENT:81y Male patient seen and examined at bedside.    Vital Signs Last 24 Hrs  T(F): 98 (10 Aug 2021 05:52), Max: 98.1 (09 Aug 2021 20:50)  HR: 88 (10 Aug 2021 05:52) (50 - 88)  BP: 144/74 (10 Aug 2021 05:52) (123/62 - 144/74)  RUE BP:                        LUE BP:  RR: 18 (10 Aug 2021 05:52) (18 - 18)  SpO2: 96% (09 Aug 2021 10:07) (96% - 96%)    I&O's Detail    09 Aug 2021 07:01  -  10 Aug 2021 07:00  --------------------------------------------------------  IN:    sodium chloride 0.9%: 350 mL  Total IN: 350 mL    OUT:    Voided (mL): 425 mL  Total OUT: 425 mL    Net: I&O's Detail    08 Aug 2021 07:01  -  09 Aug 2021 07:00  --------------------------------------------------------  Total NET: 72 mL    09 Aug 2021 07:01  -  10 Aug 2021 07:00  --------------------------------------------------------  Total NET: -75 mL    Physical Exam:  General: NAD; A&Ox3  Cardiac: S1/S2, RRR, no murmur, no rubs  Lungs: unlabored respirations, CTA b/l, no wheeze, no rales, no crackles  Abdomen: Soft/NT/ND; positive bowel sounds x 4  Extremities: No edema b/l lower extremities; good capillary refill; no cyanosis; palpable 1+ pedal pulses b/l      LABS:                        16.2   8.25  )-----------( 172      ( 10 Aug 2021 07:56 )             46.9                         15.0   9.59  )-----------( 161      ( 09 Aug 2021 06:25 )             43.2     08-09    138  |  104  |  13  ----------------------------<  122<H>  4.0   |  25  |  0.9  08-08    134<L>  |  98  |  15  ----------------------------<  145<H>  4.4   |  25  |  1.1    Ca    8.8      09 Aug 2021 06:25  Mg     1.8     08-08    TPro  6.6 [6.0 - 8.0]  /  Alb  4.3 [3.5 - 5.2]  /  TBili  0.6 [0.2 - 1.2]  /  DBili  x   /  AST  20 [0 - 41]  /  ALT  17 [0 - 41]  /  AlkPhos  69 [30 - 115]  08-08    PT/INR - ( 08 Aug 2021 15:14 )   PT: ;   INR: 1.48 ratio       PTT - ( 09 Aug 2021 06:25 )  PTT:54.8 sec, PTT - ( 09 Aug 2021 00:34 )  PTT:62.0 sec    CARDIAC MARKERS ( 08 Aug 2021 19:58 )  x     / 0.15 ng/mL / x     / x     / x      CARDIAC MARKERS ( 08 Aug 2021 15:14 )  x     / 0.04 ng/mL / x     / x     / x      CARDIAC MARKERS ( 08 Aug 2021 13:23 )  x     / 0.04 ng/mL / x     / x     / x        HGB A1C: PENDING    Pro-BNP: Serum Pro-Brain Natriuretic Peptide: 1531 pg/mL (08-08 @ 13:23)    MRSA: PENDING    COVID result: COVID-19 PCR: NotDetec (08-08-21 @ 14:08)      RADIOLOGY & ADDITIONAL TESTS:    CXR: < from: Xray Chest 2 Views PA/Lat (08.08.21 @ 14:14) >  Impression: No radiographic evidence of acute cardiopulmonary disease.  < end of copied text >    EKG: < from: 12 Lead ECG (08.09.21 @ 10:18) >  Ventricular Rate 53 BPM  Atrial Rate 53 BPM  P-R Interval 148 ms  QRS Duration 98 ms  Q-T Interval 488 ms  QTC Calculation(Bazett) 457 ms  P Axis 62 degrees  R Axis -1 degrees  T Axis 72 degrees  Diagnosis Line Sinus bradycardia  Possible Left atrial enlargement  Minimal voltage criteria for LVH, may be normal variant ( Tyron product )  Borderline ECG  Confirmed by Manjinder Bowen ([id number]) on 8/9/2021 11:01:01 AM  < end of copied text >    Carotid Duplex:  PENDING REPORT    PFT's: FEV1 71% pre/95% post    Echocardiogram: < from: TTE Echo Complete w/o Contrast w/ Doppler (08.09.21 @ 14:14) >  Summary:   1. Normal global left ventricular systolic function.   2. LV Ejection Fraction by Maldonado's Method with a biplane EF of 62 %.   3. Normal left ventricular internal cavity size.   4. The mean global longitudinal peak strain by speckle tracking is -17.8% which is borderline normal.   5. Normal left atrial size.   6. Normal right atrial size.   7. No evidence of mitral valve regurgitation.   8. Mild tricuspid regurgitation.   9. Sclerotic aortic valve with normal opening.  10. Mild pulmonic valve regurgitation.  11. Estimated pulmonary artery systolic pressure is 37.8 mmHg assuming a right atrial pressure of 8 mmHg, which is consistent with borderline pulmonary hypertension.  12. LA volume Index is 25.7 ml/m² ml/m2.    PHYSICIAN INTERPRETATION:  Left Ventricle: Normal left ventricular size and wall thicknesses, with normal systolic and diastolic function. The left ventricular internal cavity size is normal. Left ventricular wall thickness is normal. There is no left ventricular hypertrophy. Global LV systolic function was normal. Spectral Doppler shows normal pattern of LV diastolic filling. Normal LV filling pressures. The mean global longitudinal peak strain by speckle tracking is -17.8% which is borderline normal.      LV Wall Scoring:  All segments are normal.    Right Ventricle: Normal right ventricular size and function. TV S' 0.1 m/s.  Left Atrium: Normal left atrial size. LA volume Index is 25.7 ml/m² ml/m2.  Right Atrium: Normal right atrial size.  Pericardium: There is no evidence of pericardial effusion.  Mitral Valve: Structurally normal mitral valve, with normal leaflet excursion. No evidence of mitral valve regurgitation is seen.  Tricuspid Valve: Structurally normal tricuspid valve, with normal leaflet excursion. Mild tricuspid regurgitation is visualized. Estimated pulmonary artery systolic pressure is 37.8 mmHg assuming a right atrial pressure of 8 mmHg, which is consistent with borderline pulmonary hypertension.  Aortic Valve: The aortic valve is trileaflet. Sclerotic aortic valve with normal opening. No evidence of aortic valve regurgitation is seen.  Pulmonic Valve: Structurally normal pulmonic valve, with normal leaflet excursion. Mild pulmonic valve regurgitation.  Aorta: The aortic root and ascending aorta are structurally normal, with no evidence of dilitation.  Pulmonary Artery: The main pulmonary artery is normal in size.  Venous: The inferior vena cava was normal sized, with respiratory size variation less than 50%.    < end of copied text >    Cardiac catheterization:  Cardiac Cath: FINDINGS  LEFT HEART CATHETERIZATION                                  Left main: Normal  LAD:        Prox: 80% stenosis at S1       Mid: ulcerated plaque, subsequent 90% stenosis       Dist: Mild disease  Diag: Mild disease  Left Circumflex:        Prox: Mild to moderate disease; 90% stenosis in mid segment after OM bifurcation       Dist: Moderate disease  OM: Mild disease  Right Coronary Artery: 100% stenosis at the distal edge of previous stent  RPDA: supplied by faint collaterals from LAD    SYNTAX I/II SCORE: 34    POST-OP DIAGNOSIS  Significant 3-vessel disease    CT CHEST: < from: CT Chest No Cont (08.09.21 @ 11:07) >  IMPRESSION:  Severe coronary artery calcifications.  Minimal calcification ascending aorta.  Moderate calcification aortic arch.  Minimal to moderate calcification, descending aorta. Great vessels within normal limits in size.  No suspicious mediastinal, lung parenchymal or pleural lesions.  < end of copied text >      Allergies  Allergy Status Unknown  Intolerances      MEDICATIONS  (STANDING):  amLODIPine   Tablet 10 milliGRAM(s) Oral daily  aspirin enteric coated 81 milliGRAM(s) Oral daily  ATENolol  Tablet 25 milliGRAM(s) Oral daily  atorvastatin Oral Tab/Cap - Peds 40 milliGRAM(s) Oral at bedtime  dronedarone 400 milliGRAM(s) Oral two times a day  enoxaparin Injectable 70 milliGRAM(s) SubCutaneous two times a day  sodium chloride 0.9%. 1000 milliLiter(s) (50 mL/Hr) IV Continuous <Continuous>      Pre-op ACEi/ARB/CCB held 24 hours prior to planned procedure: [x] Yes, [] NO: indication:  Pre-Op Beta-Blockers: [x]Yes, []No: contraindication:  Pre-Op Aspirin: [x]Yes,  []No: contraindication: [] Held for Pre-op cardiac valve surgery with no CAD  Pre-Op Statin: [x]Yes, []No: contraindication:      Ambulation/Activity Status:   5meter walk test: T1:   5.5   s/T2: 5.5    s/T3:  5.5   s        Cardiac Surgery Risk Factors  CVA and/or carotid/cerebrovascular disease. Yes  No  Explain if Yes  Aortoiliac disease Yes  No  Explain if Yes  Previous MI Yes  No  Explain if Yes  Previous Cardiac Surgery Yes  No  Explain if Yes  Hemodynamics-Unstable or Shock Yes  No  Explain if Yes  Diabetes Yes  No  Explain if Yes  Hepatic Failure Yes  No  Explain if Yes  Renal failure and/or dialysis Yes  No  Explain if Yes  Heart failure-type-present or past Yes  No  Explain if Yes  COPD Yes  No  Explain if Yes  Immune System Deficiency Yes  No  Explain if Yes  Malignant Ventricular Arrhythmia Yes  No  Explain if Yes    STS Score:  Isolated CAB  Risk of Mortality: 1.887%  Renal Failure: 1.217%  Permanent Stroke: 1.280%  Prolonged Ventilation: 7.349%  DSW Infection: 0.243%  Reoperation: 2.552%  Morbidity or Mortality: 11.287%  Short Length of Stay: 29.137%  Long Length of Stay: 5.944%      Assessment/Plan:  82 yo M w/PMhx: CAD s/p PCI x 3 (2001x1; 2010 x2), Afib on Eliquis (last dose 8/8), HTN and HLD. Presented c/o Syncope x2 associated with stable angina and palpitations. Patient ruled in NSTEMI w/peak trop 0.15 and subsequent cardiac cath revealed 3vcad: LAD/Cirx/RCA. CT surgery consulted for possible myocardial revascularization via CABG.  - Case and plan discussed with CT Surgeon - Dr. Smith  - Continue supportive care.    - Continue DVT/GI prophylaxis  - Incentive Spirometry 10 times an hour  - Continue to advance physical activity as tolerated and continue PT/OT as directed  1. CAD pre-op cabg: Hx of PCI x3: Continue ASA, statin, BB  2. HTN: cont CCB; please hold 24 hours prior to planned CABG to prevent lo-operative hypotension.   3. Paroxysmal A. Fib: cont bb; cont to hold eliquis for planned CABG. Cont lovenox BID for a/c and hold starting 8/11.   4. Pre-op CABG: pre-op pending MRSA/UA/A1c and pt will need repeat COVID in AM.        Cardiac Surgery Pre-op Note: PLANNED OPERATIVE PROCEDURE(s):   CABG             HD # 2                       SURGEON: ABHIJIT Smith  Consult requesting by: Dr. Grady Davis  CARD: Toby Kay ([place])  PMD:    Wife: Myra: [phone]    CC: Syncopex2 w/CP + palpitations.    HISTORY OF PRESENT ILLNESS:  Patient is a 80 yo M hx of CAD s/p PCI x 3 (2001x1; 2010 x2), Afib on Eliquis (last dose 8/8), HTN and HLD. Patient presented c/o Syncope x2 associated with chest pain and palpitations. Chest pain started  last night around 11pm, described as heavy pressure, 6/10, with radiation to left arm associated with Palpitations. Patient follows with a cardiologist in [place] and was told if this occurs he should take an extra dose of his medications so around 2am he took an extra dose of Multaq (Extra 400mg) and atenolol(extra 50mg)  and took his morning dose. However, he came to the ED today because his is still having the CP and palpitations. He also noticed that he has CP with exertion which is new for him. He normally walks a lot but has needed to stop to rest while walking up the stairs due to CP. No LE swelling. No nausea, vomiting, abdominal pain, fevers or chills. Patient ruled in NSTEMI w/peak trop 0.15 and subsequent cardiac cath revealed 3vcad. CT surgery consulted for possible myocardial revascularization via CABG.    PAST MEDICAL & SURGICAL HISTORY:  CAD (coronary artery disease)  HLD (hyperlipidemia)  Hypertension    SUBJECTIVE ASSESSMENT:81y Male patient seen and examined at bedside.    Vital Signs Last 24 Hrs  T(F): 98 (10 Aug 2021 05:52), Max: 98.1 (09 Aug 2021 20:50)  HR: 88 (10 Aug 2021 05:52) (50 - 88)  BP: 144/74 (10 Aug 2021 05:52) (123/62 - 144/74)  RUE BP:                        LUE BP:  RR: 18 (10 Aug 2021 05:52) (18 - 18)  SpO2: 96% (09 Aug 2021 10:07) (96% - 96%)    I&O's Detail    09 Aug 2021 07:01  -  10 Aug 2021 07:00  --------------------------------------------------------  IN:    sodium chloride 0.9%: 350 mL  Total IN: 350 mL    OUT:    Voided (mL): 425 mL  Total OUT: 425 mL    Net: I&O's Detail    08 Aug 2021 07:01  -  09 Aug 2021 07:00  --------------------------------------------------------  Total NET: 72 mL    09 Aug 2021 07:01  -  10 Aug 2021 07:00  --------------------------------------------------------  Total NET: -75 mL    Physical Exam:  General: NAD; A&Ox3  Cardiac: S1/S2, RRR, no murmur, no rubs  Lungs: unlabored respirations, CTA b/l, no wheeze, no rales, no crackles  Abdomen: Soft/NT/ND; positive bowel sounds x 4  Extremities: No edema b/l lower extremities; good capillary refill; no cyanosis; palpable 1+ pedal pulses b/l      LABS:                        16.2   8.25  )-----------( 172      ( 10 Aug 2021 07:56 )             46.9                         15.0   9.59  )-----------( 161      ( 09 Aug 2021 06:25 )             43.2     08-09    138  |  104  |  13  ----------------------------<  122<H>  4.0   |  25  |  0.9  08-08    134<L>  |  98  |  15  ----------------------------<  145<H>  4.4   |  25  |  1.1    Ca    8.8      09 Aug 2021 06:25  Mg     1.8     08-08    TPro  6.6 [6.0 - 8.0]  /  Alb  4.3 [3.5 - 5.2]  /  TBili  0.6 [0.2 - 1.2]  /  DBili  x   /  AST  20 [0 - 41]  /  ALT  17 [0 - 41]  /  AlkPhos  69 [30 - 115]  08-08    PT/INR - ( 08 Aug 2021 15:14 )   PT: ;   INR: 1.48 ratio       PTT - ( 09 Aug 2021 06:25 )  PTT:54.8 sec, PTT - ( 09 Aug 2021 00:34 )  PTT:62.0 sec    CARDIAC MARKERS ( 08 Aug 2021 19:58 )  x     / 0.15 ng/mL / x     / x     / x      CARDIAC MARKERS ( 08 Aug 2021 15:14 )  x     / 0.04 ng/mL / x     / x     / x      CARDIAC MARKERS ( 08 Aug 2021 13:23 )  x     / 0.04 ng/mL / x     / x     / x        HGB A1C: A1C with Estimated Average Glucose Result: 5.6% (08.10.21 @ 07:56)    Pro-BNP: Serum Pro-Brain Natriuretic Peptide: 1531 pg/mL (08-08 @ 13:23)    MRSA: MRSA PCR Result.: Negative: By: Real-Time PCR (Polymerase Reaction Method) (08.10.21 @ 09:17)    Urinalysis: PENDING    COVID result: COVID-19 PCR: NotDetec (08-08-21 @ 14:08)      RADIOLOGY & ADDITIONAL TESTS:    CXR: < from: Xray Chest 2 Views PA/Lat (08.08.21 @ 14:14) >  Impression: No radiographic evidence of acute cardiopulmonary disease.  < end of copied text >    EKG: < from: 12 Lead ECG (08.09.21 @ 10:18) >  Ventricular Rate 53 BPM  Atrial Rate 53 BPM  P-R Interval 148 ms  QRS Duration 98 ms  Q-T Interval 488 ms  QTC Calculation(Bazett) 457 ms  P Axis 62 degrees  R Axis -1 degrees  T Axis 72 degrees  Diagnosis Line Sinus bradycardia  Possible Left atrial enlargement  Minimal voltage criteria for LVH, may be normal variant ( Tyron product )  Borderline ECG  Confirmed by Manjinder Bowen ([id number]) on 8/9/2021 11:01:01 AM  < end of copied text >    Carotid Duplex:  < from: VA Duplex Carotid, Bilat (08.09.21 @ 15:11) >  Impression:    Mild 20-39% stenosis of the right internal carotid artery.  Mild 20-39% stenosis of the left internal carotid artery.    < end of copied text >    PFT's: FEV1 71% pre/95% post    Echocardiogram: < from: TTE Echo Complete w/o Contrast w/ Doppler (08.09.21 @ 14:14) >  Summary:   1. Normal global left ventricular systolic function.   2. LV Ejection Fraction by Maldonado's Method with a biplane EF of 62 %.   3. Normal left ventricular internal cavity size.   4. The mean global longitudinal peak strain by speckle tracking is -17.8% which is borderline normal.   5. Normal left atrial size.   6. Normal right atrial size.   7. No evidence of mitral valve regurgitation.   8. Mild tricuspid regurgitation.   9. Sclerotic aortic valve with normal opening.  10. Mild pulmonic valve regurgitation.  11. Estimated pulmonary artery systolic pressure is 37.8 mmHg assuming a right atrial pressure of 8 mmHg, which is consistent with borderline pulmonary hypertension.  12. LA volume Index is 25.7 ml/m² ml/m2.    PHYSICIAN INTERPRETATION:  Left Ventricle: Normal left ventricular size and wall thicknesses, with normal systolic and diastolic function. The left ventricular internal cavity size is normal. Left ventricular wall thickness is normal. There is no left ventricular hypertrophy. Global LV systolic function was normal. Spectral Doppler shows normal pattern of LV diastolic filling. Normal LV filling pressures. The mean global longitudinal peak strain by speckle tracking is -17.8% which is borderline normal.      LV Wall Scoring:  All segments are normal.    Right Ventricle: Normal right ventricular size and function. TV S' 0.1 m/s.  Left Atrium: Normal left atrial size. LA volume Index is 25.7 ml/m² ml/m2.  Right Atrium: Normal right atrial size.  Pericardium: There is no evidence of pericardial effusion.  Mitral Valve: Structurally normal mitral valve, with normal leaflet excursion. No evidence of mitral valve regurgitation is seen.  Tricuspid Valve: Structurally normal tricuspid valve, with normal leaflet excursion. Mild tricuspid regurgitation is visualized. Estimated pulmonary artery systolic pressure is 37.8 mmHg assuming a right atrial pressure of 8 mmHg, which is consistent with borderline pulmonary hypertension.  Aortic Valve: The aortic valve is trileaflet. Sclerotic aortic valve with normal opening. No evidence of aortic valve regurgitation is seen.  Pulmonic Valve: Structurally normal pulmonic valve, with normal leaflet excursion. Mild pulmonic valve regurgitation.  Aorta: The aortic root and ascending aorta are structurally normal, with no evidence of dilitation.  Pulmonary Artery: The main pulmonary artery is normal in size.  Venous: The inferior vena cava was normal sized, with respiratory size variation less than 50%.    < end of copied text >    Cardiac catheterization:  Cardiac Cath: FINDINGS  LEFT HEART CATHETERIZATION                                  Left main: Normal  LAD:        Prox: 80% stenosis at S1       Mid: ulcerated plaque, subsequent 90% stenosis       Dist: Mild disease  Diag: Mild disease  Left Circumflex:        Prox: Mild to moderate disease; 90% stenosis in mid segment after OM bifurcation       Dist: Moderate disease  OM: Mild disease  Right Coronary Artery: 100% stenosis at the distal edge of previous stent  RPDA: supplied by faint collaterals from LAD    SYNTAX I/II SCORE: 34    POST-OP DIAGNOSIS  Significant 3-vessel disease    CT CHEST: < from: CT Chest No Cont (08.09.21 @ 11:07) >  IMPRESSION:  Severe coronary artery calcifications.  Minimal calcification ascending aorta.  Moderate calcification aortic arch.  Minimal to moderate calcification, descending aorta. Great vessels within normal limits in size.  No suspicious mediastinal, lung parenchymal or pleural lesions.  < end of copied text >      Allergies  Allergy Status Unknown  Intolerances      MEDICATIONS  (STANDING):  amLODIPine   Tablet 10 milliGRAM(s) Oral daily  aspirin enteric coated 81 milliGRAM(s) Oral daily  ATENolol  Tablet 25 milliGRAM(s) Oral daily  atorvastatin Oral Tab/Cap - Peds 40 milliGRAM(s) Oral at bedtime  dronedarone 400 milliGRAM(s) Oral two times a day  enoxaparin Injectable 70 milliGRAM(s) SubCutaneous two times a day  sodium chloride 0.9%. 1000 milliLiter(s) (50 mL/Hr) IV Continuous <Continuous>      Pre-op ACEi/ARB/CCB held 24 hours prior to planned procedure: [x] Yes, [] NO: indication:  Pre-Op Beta-Blockers: [x]Yes, []No: contraindication:  Pre-Op Aspirin: [x]Yes,  []No: contraindication: [] Held for Pre-op cardiac valve surgery with no CAD  Pre-Op Statin: [x]Yes, []No: contraindication:      Ambulation/Activity Status:   5meter walk test: T1:   5.5   s/T2: 5.5    s/T3:  5.5   s        Cardiac Surgery Risk Factors  CVA and/or carotid/cerebrovascular disease. Yes  No  Explain if Yes  Aortoiliac disease Yes  No  Explain if Yes  Previous MI Yes  No  Explain if Yes  Previous Cardiac Surgery Yes  No  Explain if Yes  Hemodynamics-Unstable or Shock Yes  No  Explain if Yes  Diabetes Yes  No  Explain if Yes  Hepatic Failure Yes  No  Explain if Yes  Renal failure and/or dialysis Yes  No  Explain if Yes  Heart failure-type-present or past Yes  No  Explain if Yes  COPD Yes  No  Explain if Yes  Immune System Deficiency Yes  No  Explain if Yes  Malignant Ventricular Arrhythmia Yes  No  Explain if Yes    STS Score:  Isolated CAB  Risk of Mortality: 1.887%  Renal Failure: 1.217%  Permanent Stroke: 1.280%  Prolonged Ventilation: 7.349%  DSW Infection: 0.243%  Reoperation: 2.552%  Morbidity or Mortality: 11.287%  Short Length of Stay: 29.137%  Long Length of Stay: 5.944%      Assessment/Plan:  80 yo M w/PMhx: CAD s/p PCI x 3 (2001x1; 2010 x2), Afib on Eliquis (last dose 8/8), HTN and HLD. Presented c/o Syncope x2 associated with stable angina and palpitations. Patient ruled in NSTEMI w/peak trop 0.15 and subsequent cardiac cath revealed 3vcad: LAD/Cirx/RCA. CT surgery consulted for possible myocardial revascularization via CABG.  - Case and plan discussed with CT Surgeon - Dr. Smith  - Continue supportive care.    - Continue DVT/GI prophylaxis  - Incentive Spirometry 10 times an hour  - Continue to advance physical activity as tolerated and continue PT/OT as directed  1. CAD pre-op cabg: Hx of PCI x3: Continue ASA, statin, BB  2. HTN: cont CCB; please hold 24 hours prior to planned CABG to prevent lo-operative hypotension.   3. Paroxysmal A. Fib: cont bb; cont to hold eliquis for planned CABG. Cont lovenox BID for a/c and hold starting 8/11.   4. Pre-op CABG: pre-op pending MRSA/UA/A1c and pt will need repeat COVID in AM.

## 2021-08-11 LAB
ANION GAP SERPL CALC-SCNC: 10 MMOL/L — SIGNIFICANT CHANGE UP (ref 7–14)
BUN SERPL-MCNC: 13 MG/DL — SIGNIFICANT CHANGE UP (ref 10–20)
CALCIUM SERPL-MCNC: 9.1 MG/DL — SIGNIFICANT CHANGE UP (ref 8.5–10.1)
CHLORIDE SERPL-SCNC: 102 MMOL/L — SIGNIFICANT CHANGE UP (ref 98–110)
CO2 SERPL-SCNC: 24 MMOL/L — SIGNIFICANT CHANGE UP (ref 17–32)
CREAT SERPL-MCNC: 0.8 MG/DL — SIGNIFICANT CHANGE UP (ref 0.7–1.5)
GLUCOSE SERPL-MCNC: 121 MG/DL — HIGH (ref 70–99)
HCT VFR BLD CALC: 44.7 % — SIGNIFICANT CHANGE UP (ref 42–52)
HGB BLD-MCNC: 15.7 G/DL — SIGNIFICANT CHANGE UP (ref 14–18)
MAGNESIUM SERPL-MCNC: 1.9 MG/DL — SIGNIFICANT CHANGE UP (ref 1.8–2.4)
MCHC RBC-ENTMCNC: 31.3 PG — HIGH (ref 27–31)
MCHC RBC-ENTMCNC: 35.1 G/DL — SIGNIFICANT CHANGE UP (ref 32–37)
MCV RBC AUTO: 89.2 FL — SIGNIFICANT CHANGE UP (ref 80–94)
NRBC # BLD: 0 /100 WBCS — SIGNIFICANT CHANGE UP (ref 0–0)
PLATELET # BLD AUTO: 168 K/UL — SIGNIFICANT CHANGE UP (ref 130–400)
POTASSIUM SERPL-MCNC: 4 MMOL/L — SIGNIFICANT CHANGE UP (ref 3.5–5)
POTASSIUM SERPL-SCNC: 4 MMOL/L — SIGNIFICANT CHANGE UP (ref 3.5–5)
RAPID RVP RESULT: SIGNIFICANT CHANGE UP
RBC # BLD: 5.01 M/UL — SIGNIFICANT CHANGE UP (ref 4.7–6.1)
RBC # FLD: 11.1 % — LOW (ref 11.5–14.5)
SARS-COV-2 RNA SPEC QL NAA+PROBE: SIGNIFICANT CHANGE UP
SODIUM SERPL-SCNC: 136 MMOL/L — SIGNIFICANT CHANGE UP (ref 135–146)
WBC # BLD: 6.42 K/UL — SIGNIFICANT CHANGE UP (ref 4.8–10.8)
WBC # FLD AUTO: 6.42 K/UL — SIGNIFICANT CHANGE UP (ref 4.8–10.8)

## 2021-08-11 PROCEDURE — 99233 SBSQ HOSP IP/OBS HIGH 50: CPT

## 2021-08-11 PROCEDURE — 99232 SBSQ HOSP IP/OBS MODERATE 35: CPT

## 2021-08-11 RX ORDER — CHLORHEXIDINE GLUCONATE 213 G/1000ML
1 SOLUTION TOPICAL ONCE
Refills: 0 | Status: COMPLETED | OUTPATIENT
Start: 2021-08-11 | End: 2021-08-11

## 2021-08-11 RX ORDER — MAGNESIUM SULFATE 500 MG/ML
2 VIAL (ML) INJECTION ONCE
Refills: 0 | Status: COMPLETED | OUTPATIENT
Start: 2021-08-11 | End: 2021-08-11

## 2021-08-11 RX ORDER — IPRATROPIUM/ALBUTEROL SULFATE 18-103MCG
3 AEROSOL WITH ADAPTER (GRAM) INHALATION EVERY 6 HOURS
Refills: 0 | Status: DISCONTINUED | OUTPATIENT
Start: 2021-08-11 | End: 2021-08-12

## 2021-08-11 RX ORDER — ALBUMIN HUMAN 25 %
3000 VIAL (ML) INTRAVENOUS ONCE
Refills: 0 | Status: DISCONTINUED | OUTPATIENT
Start: 2021-08-12 | End: 2021-08-12

## 2021-08-11 RX ORDER — CHLORHEXIDINE GLUCONATE 213 G/1000ML
15 SOLUTION TOPICAL ONCE
Refills: 0 | Status: COMPLETED | OUTPATIENT
Start: 2021-08-11 | End: 2021-08-12

## 2021-08-11 RX ADMIN — DRONEDARONE 400 MILLIGRAM(S): 400 TABLET, FILM COATED ORAL at 05:31

## 2021-08-11 RX ADMIN — AMLODIPINE BESYLATE 10 MILLIGRAM(S): 2.5 TABLET ORAL at 05:31

## 2021-08-11 RX ADMIN — ATENOLOL 25 MILLIGRAM(S): 25 TABLET ORAL at 05:31

## 2021-08-11 RX ADMIN — PANTOPRAZOLE SODIUM 40 MILLIGRAM(S): 20 TABLET, DELAYED RELEASE ORAL at 05:31

## 2021-08-11 RX ADMIN — Medication 50 GRAM(S): at 10:23

## 2021-08-11 RX ADMIN — CHLORHEXIDINE GLUCONATE 1 APPLICATION(S): 213 SOLUTION TOPICAL at 21:41

## 2021-08-11 RX ADMIN — DRONEDARONE 400 MILLIGRAM(S): 400 TABLET, FILM COATED ORAL at 17:41

## 2021-08-11 RX ADMIN — ATORVASTATIN CALCIUM 40 MILLIGRAM(S): 80 TABLET, FILM COATED ORAL at 21:41

## 2021-08-11 RX ADMIN — Medication 81 MILLIGRAM(S): at 11:26

## 2021-08-11 NOTE — PROGRESS NOTE ADULT - SUBJECTIVE AND OBJECTIVE BOX
RADHA BAKER 81y Male  MRN#: [id number]   CODE STATUS: full    Hospital Day: 3d    Pt is currently admitted with the primary diagnosis of ACS, NSTEMI, multivessel disease    SUBJECTIVE  Hospital Course    Overnight events: No acute events overnight. Patient denies chest pain, shortness of breath, abdominal pain. Scheduled for OR tomorrow     Subjective complaints     Present Today:   - Vazquez:  No [ X ], Yes [   ] : Indication:     - Type of IV Access:       .. CVC/Piccline:  No [ X ], Yes [   ] : Indication:       .. Midline: No [ X ], Yes [   ] : Indication:                                             ----------------------------------------------------------  OBJECTIVE  PAST MEDICAL & SURGICAL HISTORY  CAD (coronary artery disease)    HLD (hyperlipidemia)    Hypertension                                              -----------------------------------------------------------  ALLERGIES:  Allergy Status Unknown                                            ------------------------------------------------------------    HOME MEDICATIONS  Home Medications:  amLODIPine 10 mg oral tablet: 1 tab(s) orally once a day (08 Aug 2021 17:45)  atenolol 50 mg oral tablet: 1 tab(s) orally once a day (08 Aug 2021 17:45)  Eliquis 5 mg oral tablet: 1 tab(s) orally 2 times a day (08 Aug 2021 17:45)  Lipitor 40 mg oral tablet: 1 tab(s) orally once a day (08 Aug 2021 17:45)  Multaq 400 mg oral tablet: 1 tab(s) orally 2 times a day (08 Aug 2021 17:44)                           MEDICATIONS:  STANDING MEDICATIONS  amLODIPine   Tablet 10 milliGRAM(s) Oral daily  aspirin enteric coated 81 milliGRAM(s) Oral daily  ATENolol  Tablet 25 milliGRAM(s) Oral daily  atorvastatin Oral Tab/Cap - Peds 40 milliGRAM(s) Oral at bedtime  dronedarone 400 milliGRAM(s) Oral two times a day  enoxaparin Injectable 70 milliGRAM(s) SubCutaneous two times a day  pantoprazole    Tablet 40 milliGRAM(s) Oral before breakfast  sodium chloride 0.9%. 1000 milliLiter(s) IV Continuous <Continuous>    PRN MEDICATIONS                                            ------------------------------------------------------------  VITAL SIGNS: Last 24 Hours  T(C): 35.6 (11 Aug 2021 05:37), Max: 36.7 (10 Aug 2021 14:35)  T(F): 96.1 (11 Aug 2021 05:37), Max: 98 (10 Aug 2021 14:35)  HR: 59 (11 Aug 2021 05:37) (54 - 59)  BP: 155/77 (11 Aug 2021 05:40) (148/79 - 177/74)  BP(mean): --  RR: 18 (10 Aug 2021 20:38) (17 - 18)  SpO2: 97% (11 Aug 2021 05:37) (97% - 97%)      21 @ 07:  -  08-10-21 @ 07:00  --------------------------------------------------------  IN: 350 mL / OUT: 425 mL / NET: -75 mL    08-10-21 @ 07:01  -  21 @ 06:38  --------------------------------------------------------  IN: 1000 mL / OUT: 0 mL / NET: 1000 mL                                             --------------------------------------------------------------  LABS:                        16.2   8.25  )-----------( 172      ( 10 Aug 2021 07:56 )             46.9     08-10    136  |  102  |  15  ----------------------------<  139<H>  4.3   |  24  |  0.9    Ca    9.5      10 Aug 2021 07:56  Mg     2.1     08-10    TPro  7.0  /  Alb  4.5  /  TBili  1.0  /  DBili  x   /  AST  35  /  ALT  25  /  AlkPhos  82  08-10    PTT - ( 10 Aug 2021 07:56 )  PTT:36.3 sec  Urinalysis Basic - ( 10 Aug 2021 17:29 )    Color: Light Yellow / Appearance: Clear / S.010 / pH: x  Gluc: x / Ketone: Negative  / Bili: Negative / Urobili: <2 mg/dL   Blood: x / Protein: Negative / Nitrite: Negative   Leuk Esterase: Negative / RBC: x / WBC x   Sq Epi: x / Non Sq Epi: x / Bacteria: x                                                            -------------------------------------------------------------  RADIOLOGY:                                            --------------------------------------------------------------    PHYSICAL EXAM:  General:   HEENT:  LUNGS:  HEART:  ABDOMEN:  EXT:  NEURO:  SKIN:                                           --------------------------------------------------------------         RADHA BAKER 81y Male  MRN#: [id number]   CODE STATUS: full    Hospital Day: 3d    Pt is currently admitted with the primary diagnosis of ACS, NSTEMI, multivessel disease    SUBJECTIVE  Hospital Course    Overnight events: No acute events overnight. Patient denies chest pain, shortness of breath, abdominal pain. Scheduled for OR tomorrow     Subjective complaints     Present Today:   - Vazquez:  No [ X ], Yes [   ] : Indication:     - Type of IV Access:       .. CVC/Piccline:  No [ X ], Yes [   ] : Indication:       .. Midline: No [ X ], Yes [   ] : Indication:                                             ----------------------------------------------------------  OBJECTIVE  PAST MEDICAL & SURGICAL HISTORY  CAD (coronary artery disease)    HLD (hyperlipidemia)    Hypertension                                              -----------------------------------------------------------  ALLERGIES:  Allergy Status Unknown                                            ------------------------------------------------------------    HOME MEDICATIONS  Home Medications:  amLODIPine 10 mg oral tablet: 1 tab(s) orally once a day (08 Aug 2021 17:45)  atenolol 50 mg oral tablet: 1 tab(s) orally once a day (08 Aug 2021 17:45)  Eliquis 5 mg oral tablet: 1 tab(s) orally 2 times a day (08 Aug 2021 17:45)  Lipitor 40 mg oral tablet: 1 tab(s) orally once a day (08 Aug 2021 17:45)  Multaq 400 mg oral tablet: 1 tab(s) orally 2 times a day (08 Aug 2021 17:44)                           MEDICATIONS:  STANDING MEDICATIONS  amLODIPine   Tablet 10 milliGRAM(s) Oral daily  aspirin enteric coated 81 milliGRAM(s) Oral daily  ATENolol  Tablet 25 milliGRAM(s) Oral daily  atorvastatin Oral Tab/Cap - Peds 40 milliGRAM(s) Oral at bedtime  dronedarone 400 milliGRAM(s) Oral two times a day  enoxaparin Injectable 70 milliGRAM(s) SubCutaneous two times a day  pantoprazole    Tablet 40 milliGRAM(s) Oral before breakfast  sodium chloride 0.9%. 1000 milliLiter(s) IV Continuous <Continuous>    PRN MEDICATIONS                                            ------------------------------------------------------------  VITAL SIGNS: Last 24 Hours  T(C): 35.6 (11 Aug 2021 05:37), Max: 36.7 (10 Aug 2021 14:35)  T(F): 96.1 (11 Aug 2021 05:37), Max: 98 (10 Aug 2021 14:35)  HR: 59 (11 Aug 2021 05:37) (54 - 59)  BP: 155/77 (11 Aug 2021 05:40) (148/79 - 177/74)  BP(mean): --  RR: 18 (10 Aug 2021 20:38) (17 - 18)  SpO2: 97% (11 Aug 2021 05:37) (97% - 97%)      21 @ 07:  -  08-10-21 @ 07:00  --------------------------------------------------------  IN: 350 mL / OUT: 425 mL / NET: -75 mL    08-10-21 @ 07:01  -  21 @ 06:38  --------------------------------------------------------  IN: 1000 mL / OUT: 0 mL / NET: 1000 mL                                             --------------------------------------------------------------  LABS:                        16.2   8.25  )-----------( 172      ( 10 Aug 2021 07:56 )             46.9     08-10    136  |  102  |  15  ----------------------------<  139<H>  4.3   |  24  |  0.9    Ca    9.5      10 Aug 2021 07:56  Mg     2.1     08-10    TPro  7.0  /  Alb  4.5  /  TBili  1.0  /  DBili  x   /  AST  35  /  ALT  25  /  AlkPhos  82  08-10    PTT - ( 10 Aug 2021 07:56 )  PTT:36.3 sec  Urinalysis Basic - ( 10 Aug 2021 17:29 )    Color: Light Yellow / Appearance: Clear / S.010 / pH: x  Gluc: x / Ketone: Negative  / Bili: Negative / Urobili: <2 mg/dL   Blood: x / Protein: Negative / Nitrite: Negative   Leuk Esterase: Negative / RBC: x / WBC x   Sq Epi: x / Non Sq Epi: x / Bacteria: x                                                            -------------------------------------------------------------  RADIOLOGY:                                            --------------------------------------------------------------    PHYSICAL EXAM:  General: Well appearing, comfortable, not in acute distress, eating breakfast  LUNGS: CTAB, no wheeze, no rales  HEART: RRR, no murmur  ABDOMEN: soft, nontender to palpation, NBS  EXT: no peripheral pitting edema                                           --------------------------------------------------------------         RADHA BAKER 81y Male  MRN#: [id number]   CODE STATUS: full    Hospital Day: 3d    Pt is currently admitted with the primary diagnosis of ACS, NSTEMI, multivessel disease    SUBJECTIVE  Hospital Course    Overnight events: No acute events overnight. Patient denies chest pain, shortness of breath, abdominal pain. ROS otherwise unremarkable. Scheduled for OR tomorrow     Subjective complaints     Present Today:   - Vazquez:  No [ X ], Yes [   ] : Indication:     - Type of IV Access:       .. CVC/Piccline:  No [ X ], Yes [   ] : Indication:       .. Midline: No [ X ], Yes [   ] : Indication:                                             ----------------------------------------------------------  OBJECTIVE  PAST MEDICAL & SURGICAL HISTORY  CAD (coronary artery disease)    HLD (hyperlipidemia)    Hypertension                                              -----------------------------------------------------------  ALLERGIES:  Allergy Status Unknown                                            ------------------------------------------------------------    HOME MEDICATIONS  Home Medications:  amLODIPine 10 mg oral tablet: 1 tab(s) orally once a day (08 Aug 2021 17:45)  atenolol 50 mg oral tablet: 1 tab(s) orally once a day (08 Aug 2021 17:45)  Eliquis 5 mg oral tablet: 1 tab(s) orally 2 times a day (08 Aug 2021 17:45)  Lipitor 40 mg oral tablet: 1 tab(s) orally once a day (08 Aug 2021 17:45)  Multaq 400 mg oral tablet: 1 tab(s) orally 2 times a day (08 Aug 2021 17:44)                           MEDICATIONS:  STANDING MEDICATIONS  amLODIPine   Tablet 10 milliGRAM(s) Oral daily  aspirin enteric coated 81 milliGRAM(s) Oral daily  ATENolol  Tablet 25 milliGRAM(s) Oral daily  atorvastatin Oral Tab/Cap - Peds 40 milliGRAM(s) Oral at bedtime  dronedarone 400 milliGRAM(s) Oral two times a day  enoxaparin Injectable 70 milliGRAM(s) SubCutaneous two times a day  pantoprazole    Tablet 40 milliGRAM(s) Oral before breakfast  sodium chloride 0.9%. 1000 milliLiter(s) IV Continuous <Continuous>    PRN MEDICATIONS                                            ------------------------------------------------------------  VITAL SIGNS: Last 24 Hours  T(C): 35.6 (11 Aug 2021 05:37), Max: 36.7 (10 Aug 2021 14:35)  T(F): 96.1 (11 Aug 2021 05:37), Max: 98 (10 Aug 2021 14:35)  HR: 59 (11 Aug 2021 05:37) (54 - 59)  BP: 155/77 (11 Aug 2021 05:40) (148/79 - 177/74)  BP(mean): --  RR: 18 (10 Aug 2021 20:38) (17 - 18)  SpO2: 97% (11 Aug 2021 05:37) (97% - 97%)      21 @ 07:  -  08-10-21 @ 07:00  --------------------------------------------------------  IN: 350 mL / OUT: 425 mL / NET: -75 mL    08-10-21 @ 07:01  -  21 @ 06:38  --------------------------------------------------------  IN: 1000 mL / OUT: 0 mL / NET: 1000 mL                                             --------------------------------------------------------------  LABS:                        16.2   8.25  )-----------( 172      ( 10 Aug 2021 07:56 )             46.9     08-10    136  |  102  |  15  ----------------------------<  139<H>  4.3   |  24  |  0.9    Ca    9.5      10 Aug 2021 07:56  Mg     2.1     08-10    TPro  7.0  /  Alb  4.5  /  TBili  1.0  /  DBili  x   /  AST  35  /  ALT  25  /  AlkPhos  82  08-10    PTT - ( 10 Aug 2021 07:56 )  PTT:36.3 sec  Urinalysis Basic - ( 10 Aug 2021 17:29 )    Color: Light Yellow / Appearance: Clear / S.010 / pH: x  Gluc: x / Ketone: Negative  / Bili: Negative / Urobili: <2 mg/dL   Blood: x / Protein: Negative / Nitrite: Negative   Leuk Esterase: Negative / RBC: x / WBC x   Sq Epi: x / Non Sq Epi: x / Bacteria: x                                                            -------------------------------------------------------------  RADIOLOGY:                                            --------------------------------------------------------------    PHYSICAL EXAM:  General: Well appearing, comfortable, not in acute distress, eating breakfast  LUNGS: CTAB, no wheeze, no rales  HEART: RRR, no murmur  ABDOMEN: soft, nontender to palpation, NBS  EXT: no peripheral pitting edema  SKin: no rashes or elsions  PSYCH: aaox 3  HEENT: atraumatic, normocephalic                                           --------------------------------------------------------------

## 2021-08-11 NOTE — PROGRESS NOTE ADULT - SUBJECTIVE AND OBJECTIVE BOX
Cardiac Surgery Pre-op Note: PLANNED OPERATIVE PROCEDURE(s):   CABG             HD # 3                       SURGEON: ABHIJIT Smith  Consult requesting by: Dr. Grady Davis  CARD: Toby Kay ([place])  PMD:    Wife: Myra: [phone]    CC: Syncopex2 w/CP + palpitations.    HISTORY OF PRESENT ILLNESS:  Patient is a 80 yo M hx of CAD s/p PCI x 3 (2001x1; 2010 x2), Afib on Eliquis (last dose ), HTN and HLD. Patient presented c/o Syncope x2 associated with chest pain and palpitations. Chest pain started  last night around 11pm, described as heavy pressure, 6/10, with radiation to left arm associated with Palpitations. Patient follows with a cardiologist in [place] and was told if this occurs he should take an extra dose of his medications so around 2am he took an extra dose of Multaq (Extra 400mg) and atenolol(extra 50mg)  and took his morning dose. However, he came to the ED today because his is still having the CP and palpitations. He also noticed that he has CP with exertion which is new for him. He normally walks a lot but has needed to stop to rest while walking up the stairs due to CP. No LE swelling. No nausea, vomiting, abdominal pain, fevers or chills. Patient ruled in NSTEMI w/peak trop 0.15 and subsequent cardiac cath revealed 3vcad. CT surgery consulted for possible myocardial revascularization via CABG.    PAST MEDICAL & SURGICAL HISTORY:  CAD (coronary artery disease)  HLD (hyperlipidemia)  Hypertension    Vital Signs Last 24 Hrs  T(F): 96.1 (11 Aug 2021 05:37), Max: 98 (10 Aug 2021 14:35)  HR: 59 (11 Aug 2021 05:37) (54 - 59)  BP: 155/77 (11 Aug 2021 05:40) (148/79 - 177/74)  RUE BP:                        LUE BP:  RR: 18 (10 Aug 2021 20:38) (17 - 18)  SpO2: 97% (11 Aug 2021 05:37) (97% - 97%)    I&O's Detail    10 Aug 2021 07:01  -  11 Aug 2021 07:00  --------------------------------------------------------  IN:    Oral Fluid: 1000 mL  Total IN: 1000 mL    OUT:  Total OUT: 0 mL    Net: I&O's Detail    09 Aug 2021 07:01  -  10 Aug 2021 07:00  --------------------------------------------------------  Total NET: -75 mL    10 Aug 2021 07:01  -  11 Aug 2021 07:00  --------------------------------------------------------  Total NET: 1000 mL    CAPILLARY BLOOD GLUCOSE  A1C with Estimated Average Glucose Result: 5.6 % (08-10-21 @ 07:56)      Physical Exam:  General: NAD; A&Ox3  Cardiac: S1/S2, RRR, no murmur, no rubs  Lungs: unlabored respirations, CTA b/l, no wheeze, no rales, no crackles  Abdomen: Soft/NT/ND; positive bowel sounds x 4  Incisions: Incisions clean/dry/intact  Extremities: No edema b/l lower extremities; good capillary refill; no cyanosis; palpable 1+ pedal pulses b/l       LABS:                        15.7   6.42  )-----------( 168      ( 11 Aug 2021 06:16 )             44.7                         16.2   8.25  )-----------( 172      ( 10 Aug 2021 07:56 )             46.9     08-11    136  |  102  |  13  ----------------------------<  121<H>  4.0   |  24  |  0.8  08-10    136  |  102  |  15  ----------------------------<  139<H>  4.3   |  24  |  0.9    Ca    9.1      11 Aug 2021 06:16  Mg     1.9         TPro  7.0 [6.0 - 8.0]  /  Alb  4.5 [3.5 - 5.2]  /  TBili  1.0 [0.2 - 1.2]  /  DBili  x   /  AST  35 [0 - 41]  /  ALT  25 [0 - 41]  /  AlkPhos  82 [30 - 115]  08-10    PTT - ( 10 Aug 2021 07:56 )  PTT:36.3 sec    CARDIAC MARKERS ( 08 Aug 2021 19:58 )  x     / 0.15 ng/mL / x     / x     / x      CARDIAC MARKERS ( 08 Aug 2021 15:14 )  x     / 0.04 ng/mL / x     / x     / x      CARDIAC MARKERS ( 08 Aug 2021 13:23 )  x     / 0.04 ng/mL / x     / x     / x        HGB A1C: A1C with Estimated Average Glucose Result: 5.6% (08.10.21 @ 07:56)    Pro-BNP: Serum Pro-Brain Natriuretic Peptide: 1531 pg/mL ( @ 13:23)    MRSA: MRSA PCR Result.: Negative: By: Real-Time PCR (Polymerase Reaction Method) (08.10.21 @ 09:17)    Urinalysis Basic - ( 10 Aug 2021 17:29 )  Color: Light Yellow / Appearance: Clear / S.010 / pH: x  Gluc: x / Ketone: Negative  / Bili: Negative / Urobili: <2 mg/dL   Blood: x / Protein: Negative / Nitrite: Negative   Leuk Esterase: Negative / RBC: x / WBC x   Sq Epi: x / Non Sq Epi: x / Bacteria: x    COVID result: COVID-19 PCR: NotDetec (21 @ 14:08)      RADIOLOGY & ADDITIONAL TESTS:    CXR: < from: Xray Chest 2 Views PA/Lat (21 @ 14:14) >  Impression: No radiographic evidence of acute cardiopulmonary disease.  < end of copied text >    EKG: < from: 12 Lead ECG (21 @ 10:18) >  Ventricular Rate 53 BPM  Atrial Rate 53 BPM  P-R Interval 148 ms  QRS Duration 98 ms  Q-T Interval 488 ms  QTC Calculation(Bazett) 457 ms  P Axis 62 degrees  R Axis -1 degrees  T Axis 72 degrees  Diagnosis Line Sinus bradycardia  Possible Left atrial enlargement  Minimal voltage criteria for LVH, may be normal variant ( Tyron product )  Borderline ECG  Confirmed by Manjinder Bowen ([id number]) on 2021 11:01:01 AM  < end of copied text >    Carotid Duplex:  < from: VA Duplex Carotid, Bilat (21 @ 15:11) >  Impression:    Mild 20-39% stenosis of the right internal carotid artery.  Mild 20-39% stenosis of the left internal carotid artery.    < end of copied text >    PFT's: FEV1 71% pre/95% post    Echocardiogram: < from: TTE Echo Complete w/o Contrast w/ Doppler (21 @ 14:14) >  Summary:   1. Normal global left ventricular systolic function.   2. LV Ejection Fraction by Maldonado's Method with a biplane EF of 62 %.   3. Normal left ventricular internal cavity size.   4. The mean global longitudinal peak strain by speckle tracking is -17.8% which is borderline normal.   5. Normal left atrial size.   6. Normal right atrial size.   7. No evidence of mitral valve regurgitation.   8. Mild tricuspid regurgitation.   9. Sclerotic aortic valve with normal opening.  10. Mild pulmonic valve regurgitation.  11. Estimated pulmonary artery systolic pressure is 37.8 mmHg assuming a right atrial pressure of 8 mmHg, which is consistent with borderline pulmonary hypertension.  12. LA volume Index is 25.7 ml/m² ml/m2.    PHYSICIAN INTERPRETATION:  Left Ventricle: Normal left ventricular size and wall thicknesses, with normal systolic and diastolic function. The left ventricular internal cavity size is normal. Left ventricular wall thickness is normal. There is no left ventricular hypertrophy. Global LV systolic function was normal. Spectral Doppler shows normal pattern of LV diastolic filling. Normal LV filling pressures. The mean global longitudinal peak strain by speckle tracking is -17.8% which is borderline normal.      LV Wall Scoring:  All segments are normal.    Right Ventricle: Normal right ventricular size and function. TV S' 0.1 m/s.  Left Atrium: Normal left atrial size. LA volume Index is 25.7 ml/m² ml/m2.  Right Atrium: Normal right atrial size.  Pericardium: There is no evidence of pericardial effusion.  Mitral Valve: Structurally normal mitral valve, with normal leaflet excursion. No evidence of mitral valve regurgitation is seen.  Tricuspid Valve: Structurally normal tricuspid valve, with normal leaflet excursion. Mild tricuspid regurgitation is visualized. Estimated pulmonary artery systolic pressure is 37.8 mmHg assuming a right atrial pressure of 8 mmHg, which is consistent with borderline pulmonary hypertension.  Aortic Valve: The aortic valve is trileaflet. Sclerotic aortic valve with normal opening. No evidence of aortic valve regurgitation is seen.  Pulmonic Valve: Structurally normal pulmonic valve, with normal leaflet excursion. Mild pulmonic valve regurgitation.  Aorta: The aortic root and ascending aorta are structurally normal, with no evidence of dilitation.  Pulmonary Artery: The main pulmonary artery is normal in size.  Venous: The inferior vena cava was normal sized, with respiratory size variation less than 50%.    < end of copied text >    Cardiac catheterization:  Cardiac Cath: FINDINGS  LEFT HEART CATHETERIZATION                                  Left main: Normal  LAD:        Prox: 80% stenosis at S1       Mid: ulcerated plaque, subsequent 90% stenosis       Dist: Mild disease  Diag: Mild disease  Left Circumflex:        Prox: Mild to moderate disease; 90% stenosis in mid segment after OM bifurcation       Dist: Moderate disease  OM: Mild disease  Right Coronary Artery: 100% stenosis at the distal edge of previous stent  RPDA: supplied by faint collaterals from LAD    SYNTAX I/II SCORE: 34    POST-OP DIAGNOSIS  Significant 3-vessel disease    CT CHEST: < from: CT Chest No Cont (21 @ 11:07) >  IMPRESSION:  Severe coronary artery calcifications.  Minimal calcification ascending aorta.  Moderate calcification aortic arch.  Minimal to moderate calcification, descending aorta. Great vessels within normal limits in size.  No suspicious mediastinal, lung parenchymal or pleural lesions.  < end of copied text >      Allergies  Allergy Status Unknown  Intolerances      MEDICATIONS  (STANDING):  aspirin enteric coated 81 milliGRAM(s) Oral daily  ATENolol  Tablet 25 milliGRAM(s) Oral daily  atorvastatin Oral Tab/Cap - Peds 40 milliGRAM(s) Oral at bedtime  dronedarone 400 milliGRAM(s) Oral two times a day  pantoprazole    Tablet 40 milliGRAM(s) Oral before breakfast  sodium chloride 0.9%. 1000 milliLiter(s) (50 mL/Hr) IV Continuous <Continuous>    MEDICATIONS  (PRN):    Home Medications:  amLODIPine 10 mg oral tablet: 1 tab(s) orally once a day (08 Aug 2021 17:45)  atenolol 50 mg oral tablet: 1 tab(s) orally once a day (08 Aug 2021 17:45)  Eliquis 5 mg oral tablet: 1 tab(s) orally 2 times a day (08 Aug 2021 17:45)  Lipitor 40 mg oral tablet: 1 tab(s) orally once a day (08 Aug 2021 17:45)  Multaq 400 mg oral tablet: 1 tab(s) orally 2 times a day (08 Aug 2021 17:44)    Pre-op ACEi/ARB/CCB held 24 hours prior to planned procedure: [x] Yes, [] NO: indication:  Pre-Op Beta-Blockers: [x]Yes, []No: contraindication:  Pre-Op Aspirin: [x]Yes,  []No: contraindication: [] Held for Pre-op cardiac valve surgery with no CAD  Pre-Op Statin: [x]Yes, []No: contraindication:      Ambulation/Activity Status:   5meter walk test: T1:   5.5   s/T2: 5.5    s/T3:  5.5   s        Cardiac Surgery Risk Factors  CVA and/or carotid/cerebrovascular disease. Yes  No  Explain if Yes  Aortoiliac disease Yes  No  Explain if Yes  Previous MI Yes  No  Explain if Yes  Previous Cardiac Surgery Yes  No  Explain if Yes  Hemodynamics-Unstable or Shock Yes  No  Explain if Yes  Diabetes Yes  No  Explain if Yes  Hepatic Failure Yes  No  Explain if Yes  Renal failure and/or dialysis Yes  No  Explain if Yes  Heart failure-type-present or past Yes  No  Explain if Yes  COPD Yes  No  Explain if Yes: FEV1 71% pre  Immune System Deficiency Yes  No  Explain if Yes  Malignant Ventricular Arrhythmia Yes  No  Explain if Yes    STS Score:  Isolated CAB  Risk of Mortality: 1.887%  Renal Failure: 1.217%  Permanent Stroke: 1.280%  Prolonged Ventilation: 7.349%  DSW Infection: 0.243%  Reoperation: 2.552%  Morbidity or Mortality: 11.287%  Short Length of Stay: 29.137%  Long Length of Stay: 5.944%      Assessment/Plan:  80 yo M w/PMhx: CAD s/p PCI x 3 (2001x1; 2010 x2), Afib on Eliquis (last dose ), HTN and HLD. Presented c/o Syncope x2 associated with stable angina and palpitations. Patient ruled in NSTEMI w/peak trop 0.15 and subsequent cardiac cath revealed 3vcad: LAD/Cirx/RCA. CT surgery consulted for possible myocardial revascularization via CABG.  - Case and plan discussed with CT Surgeon - Dr. Smith  - Continue supportive care.    - Continue DVT/GI prophylaxis  - Incentive Spirometry 10 times an hour  - Continue to advance physical activity as tolerated and continue PT/OT as directed  1. CAD pre-op cabg: Hx of PCI x3: Continue ASA, statin, BB  2. HTN: please hold CCB/ACE/ARB 24 hours prior to planned CABG to prevent lo-operative hypotension.   3. Paroxysmal A. Fib: cont bb; cont to hold A/C eliquis and lovenox for planned CABG.   4. Pre-op CABG: pre-op pending repeat stat COVID this AM for pre-procedure.      Cardiac Surgery Pre-op Note: PLANNED OPERATIVE PROCEDURE(s):   CABG             HD # 3                       SURGEON: ABHIJIT Smith  Consult requesting by: Dr. Grady Davis  CARD: Toby Kay ([place])  PMD:    Wife: Myra: [phone]    CC: Syncopex2 w/CP + palpitations.    HISTORY OF PRESENT ILLNESS:  Patient is a 82 yo M hx of CAD s/p PCI x 3 (2001x1; 2010 x2), Afib on Eliquis (last dose ), HTN and HLD. Patient presented c/o Syncope x2 associated with chest pain and palpitations. Chest pain started  last night around 11pm, described as heavy pressure, 6/10, with radiation to left arm associated with Palpitations. Patient follows with a cardiologist in [place] and was told if this occurs he should take an extra dose of his medications so around 2am he took an extra dose of Multaq (Extra 400mg) and atenolol(extra 50mg)  and took his morning dose. However, he came to the ED today because his is still having the CP and palpitations. He also noticed that he has CP with exertion which is new for him. He normally walks a lot but has needed to stop to rest while walking up the stairs due to CP. No LE swelling. No nausea, vomiting, abdominal pain, fevers or chills. Patient ruled in NSTEMI w/peak trop 0.15 and subsequent cardiac cath revealed 3vcad. CT surgery consulted for possible myocardial revascularization via CABG.    PAST MEDICAL & SURGICAL HISTORY:  CAD (coronary artery disease)  HLD (hyperlipidemia)  Hypertension    Vital Signs Last 24 Hrs  T(F): 96.1 (11 Aug 2021 05:37), Max: 98 (10 Aug 2021 14:35)  HR: 59 (11 Aug 2021 05:37) (54 - 59)  BP: 155/77 (11 Aug 2021 05:40) (148/79 - 177/74)  RUE BP:                        LUE BP:  RR: 18 (10 Aug 2021 20:38) (17 - 18)  SpO2: 97% (11 Aug 2021 05:37) (97% - 97%)    I&O's Detail    10 Aug 2021 07:01  -  11 Aug 2021 07:00  --------------------------------------------------------  IN:    Oral Fluid: 1000 mL  Total IN: 1000 mL    OUT:  Total OUT: 0 mL    Net: I&O's Detail    09 Aug 2021 07:01  -  10 Aug 2021 07:00  --------------------------------------------------------  Total NET: -75 mL    10 Aug 2021 07:01  -  11 Aug 2021 07:00  --------------------------------------------------------  Total NET: 1000 mL    CAPILLARY BLOOD GLUCOSE  A1C with Estimated Average Glucose Result: 5.6 % (08-10-21 @ 07:56)      Physical Exam:  General: NAD; A&Ox3  Cardiac: S1/S2, RRR, no murmur, no rubs  Lungs: unlabored respirations, CTA b/l, no wheeze, no rales, no crackles  Abdomen: Soft/NT/ND; positive bowel sounds x 4  Incisions: Incisions clean/dry/intact  Extremities: No edema b/l lower extremities; good capillary refill; no cyanosis; palpable 1+ pedal pulses b/l       LABS:                        15.7   6.42  )-----------( 168      ( 11 Aug 2021 06:16 )             44.7                         16.2   8.25  )-----------( 172      ( 10 Aug 2021 07:56 )             46.9     08-11    136  |  102  |  13  ----------------------------<  121<H>  4.0   |  24  |  0.8  08-10    136  |  102  |  15  ----------------------------<  139<H>  4.3   |  24  |  0.9    Ca    9.1      11 Aug 2021 06:16  Mg     1.9         TPro  7.0 [6.0 - 8.0]  /  Alb  4.5 [3.5 - 5.2]  /  TBili  1.0 [0.2 - 1.2]  /  DBili  x   /  AST  35 [0 - 41]  /  ALT  25 [0 - 41]  /  AlkPhos  82 [30 - 115]  08-10    PTT - ( 10 Aug 2021 07:56 )  PTT:36.3 sec    CARDIAC MARKERS ( 08 Aug 2021 19:58 )  x     / 0.15 ng/mL / x     / x     / x      CARDIAC MARKERS ( 08 Aug 2021 15:14 )  x     / 0.04 ng/mL / x     / x     / x      CARDIAC MARKERS ( 08 Aug 2021 13:23 )  x     / 0.04 ng/mL / x     / x     / x        HGB A1C: A1C with Estimated Average Glucose Result: 5.6% (08.10.21 @ 07:56)    Pro-BNP: Serum Pro-Brain Natriuretic Peptide: 1531 pg/mL ( @ 13:23)    MRSA: MRSA PCR Result.: Negative: By: Real-Time PCR (Polymerase Reaction Method) (08.10.21 @ 09:17)    Urinalysis Basic - ( 10 Aug 2021 17:29 )  Color: Light Yellow / Appearance: Clear / S.010 / pH: x  Gluc: x / Ketone: Negative  / Bili: Negative / Urobili: <2 mg/dL   Blood: x / Protein: Negative / Nitrite: Negative   Leuk Esterase: Negative / RBC: x / WBC x   Sq Epi: x / Non Sq Epi: x / Bacteria: x    COVID result: COVID-19 PCR: NotDetec (21 @ 14:08)      RADIOLOGY & ADDITIONAL TESTS:    CXR: < from: Xray Chest 2 Views PA/Lat (21 @ 14:14) >  Impression: No radiographic evidence of acute cardiopulmonary disease.  < end of copied text >    EKG: < from: 12 Lead ECG (21 @ 10:18) >  Ventricular Rate 53 BPM  Atrial Rate 53 BPM  P-R Interval 148 ms  QRS Duration 98 ms  Q-T Interval 488 ms  QTC Calculation(Bazett) 457 ms  P Axis 62 degrees  R Axis -1 degrees  T Axis 72 degrees  Diagnosis Line Sinus bradycardia  Possible Left atrial enlargement  Minimal voltage criteria for LVH, may be normal variant ( Tyron product )  Borderline ECG  Confirmed by Manjinder Bowen ([id number]) on 2021 11:01:01 AM  < end of copied text >    Carotid Duplex:  < from: VA Duplex Carotid, Bilat (21 @ 15:11) >  Impression:    Mild 20-39% stenosis of the right internal carotid artery.  Mild 20-39% stenosis of the left internal carotid artery.    < end of copied text >    PFT's: FEV1 71% pre/95% post    Echocardiogram: < from: TTE Echo Complete w/o Contrast w/ Doppler (21 @ 14:14) >  Summary:   1. Normal global left ventricular systolic function.   2. LV Ejection Fraction by Maldonado's Method with a biplane EF of 62 %.   3. Normal left ventricular internal cavity size.   4. The mean global longitudinal peak strain by speckle tracking is -17.8% which is borderline normal.   5. Normal left atrial size.   6. Normal right atrial size.   7. No evidence of mitral valve regurgitation.   8. Mild tricuspid regurgitation.   9. Sclerotic aortic valve with normal opening.  10. Mild pulmonic valve regurgitation.  11. Estimated pulmonary artery systolic pressure is 37.8 mmHg assuming a right atrial pressure of 8 mmHg, which is consistent with borderline pulmonary hypertension.  12. LA volume Index is 25.7 ml/m² ml/m2.    PHYSICIAN INTERPRETATION:  Left Ventricle: Normal left ventricular size and wall thicknesses, with normal systolic and diastolic function. The left ventricular internal cavity size is normal. Left ventricular wall thickness is normal. There is no left ventricular hypertrophy. Global LV systolic function was normal. Spectral Doppler shows normal pattern of LV diastolic filling. Normal LV filling pressures. The mean global longitudinal peak strain by speckle tracking is -17.8% which is borderline normal.      LV Wall Scoring:  All segments are normal.    Right Ventricle: Normal right ventricular size and function. TV S' 0.1 m/s.  Left Atrium: Normal left atrial size. LA volume Index is 25.7 ml/m² ml/m2.  Right Atrium: Normal right atrial size.  Pericardium: There is no evidence of pericardial effusion.  Mitral Valve: Structurally normal mitral valve, with normal leaflet excursion. No evidence of mitral valve regurgitation is seen.  Tricuspid Valve: Structurally normal tricuspid valve, with normal leaflet excursion. Mild tricuspid regurgitation is visualized. Estimated pulmonary artery systolic pressure is 37.8 mmHg assuming a right atrial pressure of 8 mmHg, which is consistent with borderline pulmonary hypertension.  Aortic Valve: The aortic valve is trileaflet. Sclerotic aortic valve with normal opening. No evidence of aortic valve regurgitation is seen.  Pulmonic Valve: Structurally normal pulmonic valve, with normal leaflet excursion. Mild pulmonic valve regurgitation.  Aorta: The aortic root and ascending aorta are structurally normal, with no evidence of dilitation.  Pulmonary Artery: The main pulmonary artery is normal in size.  Venous: The inferior vena cava was normal sized, with respiratory size variation less than 50%.    < end of copied text >    Cardiac catheterization:  Cardiac Cath: FINDINGS  LEFT HEART CATHETERIZATION                                  Left main: Normal  LAD:        Prox: 80% stenosis at S1       Mid: ulcerated plaque, subsequent 90% stenosis       Dist: Mild disease  Diag: Mild disease  Left Circumflex:        Prox: Mild to moderate disease; 90% stenosis in mid segment after OM bifurcation       Dist: Moderate disease  OM: Mild disease  Right Coronary Artery: 100% stenosis at the distal edge of previous stent  RPDA: supplied by faint collaterals from LAD    SYNTAX I/II SCORE: 34    POST-OP DIAGNOSIS  Significant 3-vessel disease    CT CHEST: < from: CT Chest No Cont (21 @ 11:07) >  IMPRESSION:  Severe coronary artery calcifications.  Minimal calcification ascending aorta.  Moderate calcification aortic arch.  Minimal to moderate calcification, descending aorta. Great vessels within normal limits in size.  No suspicious mediastinal, lung parenchymal or pleural lesions.  < end of copied text >      Allergies  Allergy Status Unknown  Intolerances      MEDICATIONS  (STANDING):  aspirin enteric coated 81 milliGRAM(s) Oral daily  ATENolol  Tablet 25 milliGRAM(s) Oral daily  atorvastatin Oral Tab/Cap - Peds 40 milliGRAM(s) Oral at bedtime  dronedarone 400 milliGRAM(s) Oral two times a day  pantoprazole    Tablet 40 milliGRAM(s) Oral before breakfast  sodium chloride 0.9%. 1000 milliLiter(s) (50 mL/Hr) IV Continuous <Continuous>    MEDICATIONS  (PRN):    Home Medications:  amLODIPine 10 mg oral tablet: 1 tab(s) orally once a day (08 Aug 2021 17:45)  atenolol 50 mg oral tablet: 1 tab(s) orally once a day (08 Aug 2021 17:45)  Eliquis 5 mg oral tablet: 1 tab(s) orally 2 times a day (08 Aug 2021 17:45)  Lipitor 40 mg oral tablet: 1 tab(s) orally once a day (08 Aug 2021 17:45)  Multaq 400 mg oral tablet: 1 tab(s) orally 2 times a day (08 Aug 2021 17:44)    Pre-op ACEi/ARB/CCB held 24 hours prior to planned procedure: [x] Yes, [] NO: indication:  Pre-Op Beta-Blockers: [x]Yes, []No: contraindication:  Pre-Op Aspirin: [x]Yes,  []No: contraindication: [] Held for Pre-op cardiac valve surgery with no CAD  Pre-Op Statin: [x]Yes, []No: contraindication:      Ambulation/Activity Status:   5meter walk test: T1:   5.5   s/T2: 5.5    s/T3:  5.5   s        Cardiac Surgery Risk Factors  CVA and/or carotid/cerebrovascular disease. Yes  No  Explain if Yes  Aortoiliac disease Yes  No  Explain if Yes  Previous MI Yes  No  Explain if Yes  Previous Cardiac Surgery Yes  No  Explain if Yes  Hemodynamics-Unstable or Shock Yes  No  Explain if Yes  Diabetes Yes  No  Explain if Yes  Hepatic Failure Yes  No  Explain if Yes  Renal failure and/or dialysis Yes  No  Explain if Yes  Heart failure-type-present or past Yes  No  Explain if Yes  COPD Yes  No  Explain if Yes: FEV1 71% pre  Immune System Deficiency Yes  No  Explain if Yes  Malignant Ventricular Arrhythmia Yes  No  Explain if Yes    STS Score:  Isolated CAB  Risk of Mortality: 1.887%  Renal Failure: 1.217%  Permanent Stroke: 1.280%  Prolonged Ventilation: 7.349%  DSW Infection: 0.243%  Reoperation: 2.552%  Morbidity or Mortality: 11.287%  Short Length of Stay: 29.137%  Long Length of Stay: 5.944%      Assessment/Plan:  82 yo M w/PMhx: CAD s/p PCI x 3 (2001x1; 2010 x2), Afib on Eliquis (last dose ), HTN and HLD. Presented c/o Syncope x2 associated with stable angina and palpitations. Patient ruled in NSTEMI w/peak trop 0.15 and subsequent cardiac cath revealed 3vcad: LAD/Cirx/RCA. CT surgery consulted for possible myocardial revascularization via CABG.  - Case and plan discussed with CT Surgeon - Dr. Smith  - Continue supportive care.    - Continue DVT/GI prophylaxis  - Incentive Spirometry 10 times an hour  - Continue to advance physical activity as tolerated and continue PT/OT as directed  1. CAD pre-op cabg: Hx of PCI x3: Continue ASA, statin, BB  2. HTN: please hold CCB/ACE/ARB 24 hours prior to planned CABG to prevent lo-operative hypotension.   3. Paroxysmal A. Fib: cont bb; cont to hold A/C eliquis and lovenox for planned CABG.   4. Pre-op CABG: pre-op pending repeat stat COVID this AM for pre-procedure.     CTS Attending  Pt and wife interviewed with aid of a tele-  all questions answered  procedure explained including risks, benefits and alternatives  pt gave informed consent for cabg  keep npo past 12 mn  surgery tomorrow in [place]  -[facility]

## 2021-08-11 NOTE — PROGRESS NOTE ADULT - SUBJECTIVE AND OBJECTIVE BOX
Patient is a 81y old  Male who presents with a chief complaint of Chest pain (11 Aug 2021 07:54)    HPI:  Patient is a 80 yo M hx of CAD s/p PCI x 3 last 2010, Afib on Eliquis and HLD presented with cc of chest pain. Chest pain started  last night around 11pm, described as heavy pressure, 6/10, with radiation to left arm associated with Palpitations. Patient follows with a cardiologist in [place] and was told if this occurs he should take an extra dose of his medications so around 2am he took an extra dose of Multaq (Extra 400mg) and atenolol(extra 50mg)  and took his morning dose. However, he came to the ED today because his is still having the CP and palpitations. He also noticed that he has CP with exertion which is new for him. He normally walks a lot but has needed to stop to rest while walking up the stairs due to CP. No LE swelling. No nausea, vomiting, abdominal pain, fevers or chills.    IN ED: Pt was hypotensive to 90's with improvement to 100's  initially 90s, bradycardic to  HR 50's   Labs: Trop: 0.04 x 2, EKG: Afib, no ST changes      (08 Aug 2021 17:18)      SUBJ:  Patient seen and examined.      MEDICATIONS  (STANDING):  albuterol/ipratropium for Nebulization 3 milliLiter(s) Nebulizer every 6 hours  aspirin enteric coated 81 milliGRAM(s) Oral daily  ATENolol  Tablet 25 milliGRAM(s) Oral daily  atorvastatin Oral Tab/Cap - Peds 40 milliGRAM(s) Oral at bedtime  chlorhexidine 0.12% Liquid 15 milliLiter(s) Swish and Spit once  chlorhexidine 4% Liquid 1 Application(s) Topical once  dronedarone 400 milliGRAM(s) Oral two times a day  pantoprazole    Tablet 40 milliGRAM(s) Oral before breakfast  sodium chloride 0.9%. 1000 milliLiter(s) (50 mL/Hr) IV Continuous <Continuous>    MEDICATIONS  (PRN):            Vital Signs Last 24 Hrs  T(C): 35.6 (11 Aug 2021 05:37), Max: 36.7 (10 Aug 2021 14:35)  T(F): 96.1 (11 Aug 2021 05:37), Max: 98 (10 Aug 2021 14:35)  HR: 59 (11 Aug 2021 05:37) (54 - 59)  BP: 155/77 (11 Aug 2021 05:40) (148/79 - 177/74)  BP(mean): --  RR: 18 (10 Aug 2021 20:38) (17 - 18)  SpO2: 97% (11 Aug 2021 05:37) (97% - 97%)      PHYSICAL EXAM:    GEN: AAO x 3, NAD  HEENT: NC/AT, PERRL  Neck: No JVD, no bruits  CV: Reg, S1-S2, no murmur  Lungs: CTAB  Abd: Soft, non-tender  Ext: No edema        08-10-21 @ 07:01  -  08-11-21 @ 07:00  --------------------------------------------------------  IN: 1000 mL / OUT: 0 mL / NET: 1000 mL        I&O's Summary    10 Aug 2021 07:01  -  11 Aug 2021 07:00  --------------------------------------------------------  IN: 1000 mL / OUT: 0 mL / NET: 1000 mL    	    TELEMETRY:    ECG:    TTE:      LABS:                        15.7   6.42  )-----------( 168      ( 11 Aug 2021 06:16 )             44.7     08-11    136  |  102  |  13  ----------------------------<  121<H>  4.0   |  24  |  0.8    Ca    9.1      11 Aug 2021 06:16  Mg     1.9     08-11    TPro  7.0  /  Alb  4.5  /  TBili  1.0  /  DBili  x   /  AST  35  /  ALT  25  /  AlkPhos  82  08-10        PTT - ( 10 Aug 2021 07:56 )  PTT:36.3 sec      BNP  RADIOLOGY & ADDITIONAL STUDIES:      IMPRESSION AND PLAN:

## 2021-08-11 NOTE — PROGRESS NOTE ADULT - ASSESSMENT
ASSESSMENT & PLAN    Patient is a 80 yo M hx of CAD s/p PCI x 3 last 2010, Afib on Eliquis and HLD presented with chest pain found to have NSTEMI, found to have severe multivessel disease on cath (LAD 80% stenosis in L1, Mid 90% stenosis, left circumflex and ptox to have 90% stenosis in mid sigment after OM bifurcation, and % stenosis) awaiting CABG.     #NSTEMI 2/2 multivessel CAD  -f/u CT surgery recs. Per CT surgry, will hold ACEI/ARB/CCB 24 hours prior to planned procedure, LUE precaution for possible radial artery harvest  -  Hold lovenox (70mg SubQ BID) starting 8/11, NPO midnight  -TTE on 8/9 showed EF: 62%  Troponin: 0.4 x2, then increased to 0.15  EKG with AFIB, no ST changes   Sublingual Nitro for continued CP     #Paroxysmal Atrial Fibrillation  EKG on admission with AFIB  Eliquis Switched to Heparin drip then now on therapeutic lovenox. Hold lovenox starting 8/11  Rate control with Multaq 400mg BID and Atenolol 40mg 25mg QD    #HTN:  - Hold amlodipine (10mg QD) starting 8/11. Continue atenolol and statin    #HLD  -atorvastatin 40mg QD      DVT PPX: Therapeutic lovenox  GI PPX: pantoprazole BID  Full Code  Dispo: acute. to CT surgery

## 2021-08-11 NOTE — PROGRESS NOTE ADULT - ATTENDING COMMENTS
Pt seen and examined at bedside. Agree with above except where edited. Pt planned for CABG tomorrow. C/w telemetry monitoring for now.    #Progress Note Handoff:  Pending (specify):  CABG  Family discussion: Discussed pending cabg  Disposition: Home___/SNF___/Other________/Unknown at this time____x____
Pt seen and examined at bedside. Patient states he feels sad he needs surgery but understands it is for the best.    PHYSICAL EXAM:  GENERAL: NAD, speaks in full sentences, no signs of respiratory distress  HEAD:  Atraumatic, Normocephalic  EYES: Anicteric  NECK: Supple, No JVD  CHEST/LUNG: Clear to auscultation bilaterally; No wheeze; No crackles; No accessory muscles used  HEART: Regular rate and rhythm; No murmurs;   ABDOMEN: Soft, Nontender, Nondistended; Bowel sounds present; No guarding  EXTREMITIES:  2+ Peripheral Pulses, No cyanosis or edema  PSYCH: AAOx3  NEUROLOGY: non-focal  SKIN: No rashes or lesions    Pt requires continued telemetry monitoring given triple vessel disease    #Progress Note Handoff:  Pending (specify):  CABG  Family discussion: discussed CABG with pt  Disposition: Home___/SNF___/Other________/Unknown at this time___x_____

## 2021-08-11 NOTE — PROGRESS NOTE ADULT - ASSESSMENT
3 vessel CAD  NSTEMI  PAF - now in NSR  HTN, DL      C/w ASA, Lovenox full dose - hold tonight for pre-op, c/w statin, BB  C/w Multaq for AF prevention/rhythm control - may use Amiodarone perioperatively instead.  CT surgery follow-up appreciated.  CT chest, echo noted    Plan for CABG tomorrow

## 2021-08-12 ENCOUNTER — TRANSCRIPTION ENCOUNTER (OUTPATIENT)
Age: 81
End: 2021-08-12

## 2021-08-12 LAB
ALBUMIN SERPL ELPH-MCNC: 4.5 G/DL — SIGNIFICANT CHANGE UP (ref 3.5–5.2)
ALP SERPL-CCNC: 34 U/L — SIGNIFICANT CHANGE UP (ref 30–115)
ALT FLD-CCNC: 23 U/L — SIGNIFICANT CHANGE UP (ref 0–41)
ANION GAP SERPL CALC-SCNC: 10 MMOL/L — SIGNIFICANT CHANGE UP (ref 7–14)
ANION GAP SERPL CALC-SCNC: 13 MMOL/L — SIGNIFICANT CHANGE UP (ref 7–14)
APTT BLD: 26.6 SEC — LOW (ref 27–39.2)
APTT BLD: 31 SEC — SIGNIFICANT CHANGE UP (ref 27–39.2)
AST SERPL-CCNC: 24 U/L — SIGNIFICANT CHANGE UP (ref 0–41)
BILIRUB SERPL-MCNC: 1.5 MG/DL — HIGH (ref 0.2–1.2)
BLD GP AB SCN SERPL QL: SIGNIFICANT CHANGE UP
BUN SERPL-MCNC: 11 MG/DL — SIGNIFICANT CHANGE UP (ref 10–20)
BUN SERPL-MCNC: 13 MG/DL — SIGNIFICANT CHANGE UP (ref 10–20)
CALCIUM SERPL-MCNC: 7.4 MG/DL — LOW (ref 8.5–10.1)
CALCIUM SERPL-MCNC: 9.3 MG/DL — SIGNIFICANT CHANGE UP (ref 8.5–10.1)
CHLORIDE SERPL-SCNC: 105 MMOL/L — SIGNIFICANT CHANGE UP (ref 98–110)
CHLORIDE SERPL-SCNC: 99 MMOL/L — SIGNIFICANT CHANGE UP (ref 98–110)
CO2 SERPL-SCNC: 20 MMOL/L — SIGNIFICANT CHANGE UP (ref 17–32)
CO2 SERPL-SCNC: 25 MMOL/L — SIGNIFICANT CHANGE UP (ref 17–32)
CREAT SERPL-MCNC: 0.8 MG/DL — SIGNIFICANT CHANGE UP (ref 0.7–1.5)
CREAT SERPL-MCNC: 0.9 MG/DL — SIGNIFICANT CHANGE UP (ref 0.7–1.5)
GAS PNL BLDA: SIGNIFICANT CHANGE UP
GLUCOSE BLDC GLUCOMTR-MCNC: 104 MG/DL — HIGH (ref 70–99)
GLUCOSE BLDC GLUCOMTR-MCNC: 108 MG/DL — HIGH (ref 70–99)
GLUCOSE BLDC GLUCOMTR-MCNC: 119 MG/DL — HIGH (ref 70–99)
GLUCOSE BLDC GLUCOMTR-MCNC: 125 MG/DL — HIGH (ref 70–99)
GLUCOSE BLDC GLUCOMTR-MCNC: 138 MG/DL — HIGH (ref 70–99)
GLUCOSE BLDC GLUCOMTR-MCNC: 143 MG/DL — HIGH (ref 70–99)
GLUCOSE BLDC GLUCOMTR-MCNC: 161 MG/DL — HIGH (ref 70–99)
GLUCOSE BLDC GLUCOMTR-MCNC: 162 MG/DL — HIGH (ref 70–99)
GLUCOSE BLDC GLUCOMTR-MCNC: 94 MG/DL — SIGNIFICANT CHANGE UP (ref 70–99)
GLUCOSE SERPL-MCNC: 143 MG/DL — HIGH (ref 70–99)
GLUCOSE SERPL-MCNC: 92 MG/DL — SIGNIFICANT CHANGE UP (ref 70–99)
HCT VFR BLD CALC: 29.8 % — LOW (ref 42–52)
HCT VFR BLD CALC: 43 % — SIGNIFICANT CHANGE UP (ref 42–52)
HGB BLD-MCNC: 10.7 G/DL — LOW (ref 14–18)
HGB BLD-MCNC: 15.3 G/DL — SIGNIFICANT CHANGE UP (ref 14–18)
INR BLD: 1.11 RATIO — SIGNIFICANT CHANGE UP (ref 0.65–1.3)
INR BLD: 1.66 RATIO — HIGH (ref 0.65–1.3)
MAGNESIUM SERPL-MCNC: 1.9 MG/DL — SIGNIFICANT CHANGE UP (ref 1.8–2.4)
MAGNESIUM SERPL-MCNC: 2.1 MG/DL — SIGNIFICANT CHANGE UP (ref 1.8–2.4)
MCHC RBC-ENTMCNC: 31.7 PG — HIGH (ref 27–31)
MCHC RBC-ENTMCNC: 31.9 PG — HIGH (ref 27–31)
MCHC RBC-ENTMCNC: 35.6 G/DL — SIGNIFICANT CHANGE UP (ref 32–37)
MCHC RBC-ENTMCNC: 35.9 G/DL — SIGNIFICANT CHANGE UP (ref 32–37)
MCV RBC AUTO: 88.2 FL — SIGNIFICANT CHANGE UP (ref 80–94)
MCV RBC AUTO: 89.6 FL — SIGNIFICANT CHANGE UP (ref 80–94)
NRBC # BLD: 0 /100 WBCS — SIGNIFICANT CHANGE UP (ref 0–0)
NRBC # BLD: 0 /100 WBCS — SIGNIFICANT CHANGE UP (ref 0–0)
PLATELET # BLD AUTO: 106 K/UL — LOW (ref 130–400)
PLATELET # BLD AUTO: 162 K/UL — SIGNIFICANT CHANGE UP (ref 130–400)
POTASSIUM SERPL-MCNC: 4 MMOL/L — SIGNIFICANT CHANGE UP (ref 3.5–5)
POTASSIUM SERPL-MCNC: 4.1 MMOL/L — SIGNIFICANT CHANGE UP (ref 3.5–5)
POTASSIUM SERPL-SCNC: 4 MMOL/L — SIGNIFICANT CHANGE UP (ref 3.5–5)
POTASSIUM SERPL-SCNC: 4.1 MMOL/L — SIGNIFICANT CHANGE UP (ref 3.5–5)
PROT SERPL-MCNC: 5.7 G/DL — LOW (ref 6–8)
PROTHROM AB SERPL-ACNC: 12.8 SEC — SIGNIFICANT CHANGE UP (ref 9.95–12.87)
PROTHROM AB SERPL-ACNC: 19 SEC — HIGH (ref 9.95–12.87)
RBC # BLD: 3.38 M/UL — LOW (ref 4.7–6.1)
RBC # BLD: 4.8 M/UL — SIGNIFICANT CHANGE UP (ref 4.7–6.1)
RBC # FLD: 11.2 % — LOW (ref 11.5–14.5)
RBC # FLD: 11.3 % — LOW (ref 11.5–14.5)
SODIUM SERPL-SCNC: 134 MMOL/L — LOW (ref 135–146)
SODIUM SERPL-SCNC: 138 MMOL/L — SIGNIFICANT CHANGE UP (ref 135–146)
WBC # BLD: 16.54 K/UL — HIGH (ref 4.8–10.8)
WBC # BLD: 7.09 K/UL — SIGNIFICANT CHANGE UP (ref 4.8–10.8)
WBC # FLD AUTO: 16.54 K/UL — HIGH (ref 4.8–10.8)
WBC # FLD AUTO: 7.09 K/UL — SIGNIFICANT CHANGE UP (ref 4.8–10.8)

## 2021-08-12 PROCEDURE — 71045 X-RAY EXAM CHEST 1 VIEW: CPT | Mod: 26

## 2021-08-12 PROCEDURE — 33999 UNLISTED PX CARDIAC SURGERY: CPT | Mod: AS

## 2021-08-12 PROCEDURE — 33508 ENDOSCOPIC VEIN HARVEST: CPT | Mod: 59

## 2021-08-12 PROCEDURE — 71045 X-RAY EXAM CHEST 1 VIEW: CPT | Mod: 26,77

## 2021-08-12 PROCEDURE — 33533 CABG ARTERIAL SINGLE: CPT

## 2021-08-12 PROCEDURE — 33519 CABG ARTERY-VEIN THREE: CPT | Mod: AS

## 2021-08-12 PROCEDURE — 33508 ENDOSCOPIC VEIN HARVEST: CPT | Mod: AS,59

## 2021-08-12 PROCEDURE — 33999 UNLISTED PX CARDIAC SURGERY: CPT

## 2021-08-12 PROCEDURE — 93010 ELECTROCARDIOGRAM REPORT: CPT

## 2021-08-12 PROCEDURE — 33533 CABG ARTERIAL SINGLE: CPT | Mod: AS

## 2021-08-12 PROCEDURE — 33519 CABG ARTERY-VEIN THREE: CPT

## 2021-08-12 RX ORDER — CHLORHEXIDINE GLUCONATE 213 G/1000ML
5 SOLUTION TOPICAL
Refills: 0 | Status: DISCONTINUED | OUTPATIENT
Start: 2021-08-12 | End: 2021-08-14

## 2021-08-12 RX ORDER — PROPOFOL 10 MG/ML
20 INJECTION, EMULSION INTRAVENOUS
Qty: 1000 | Refills: 0 | Status: DISCONTINUED | OUTPATIENT
Start: 2021-08-12 | End: 2021-08-13

## 2021-08-12 RX ORDER — DEXTROSE 50 % IN WATER 50 %
25 SYRINGE (ML) INTRAVENOUS
Refills: 0 | Status: DISCONTINUED | OUTPATIENT
Start: 2021-08-12 | End: 2021-08-19

## 2021-08-12 RX ORDER — CEFAZOLIN SODIUM 1 G
2000 VIAL (EA) INJECTION EVERY 8 HOURS
Refills: 0 | Status: COMPLETED | OUTPATIENT
Start: 2021-08-12 | End: 2021-08-13

## 2021-08-12 RX ORDER — OXYCODONE HYDROCHLORIDE 5 MG/1
5 TABLET ORAL EVERY 6 HOURS
Refills: 0 | Status: DISCONTINUED | OUTPATIENT
Start: 2021-08-12 | End: 2021-08-12

## 2021-08-12 RX ORDER — ALBUTEROL 90 UG/1
2 AEROSOL, METERED ORAL EVERY 6 HOURS
Refills: 0 | Status: DISCONTINUED | OUTPATIENT
Start: 2021-08-12 | End: 2021-08-13

## 2021-08-12 RX ORDER — DEXMEDETOMIDINE HYDROCHLORIDE IN 0.9% SODIUM CHLORIDE 4 UG/ML
0.1 INJECTION INTRAVENOUS
Qty: 200 | Refills: 0 | Status: DISCONTINUED | OUTPATIENT
Start: 2021-08-12 | End: 2021-08-13

## 2021-08-12 RX ORDER — MAGNESIUM SULFATE 500 MG/ML
2 VIAL (ML) INJECTION ONCE
Refills: 0 | Status: COMPLETED | OUTPATIENT
Start: 2021-08-12 | End: 2021-08-12

## 2021-08-12 RX ORDER — IPRATROPIUM BROMIDE 0.2 MG/ML
2 SOLUTION, NON-ORAL INHALATION EVERY 6 HOURS
Refills: 0 | Status: DISCONTINUED | OUTPATIENT
Start: 2021-08-12 | End: 2021-08-13

## 2021-08-12 RX ORDER — VASOPRESSIN 20 [USP'U]/ML
0.04 INJECTION INTRAVENOUS
Qty: 50 | Refills: 0 | Status: DISCONTINUED | OUTPATIENT
Start: 2021-08-12 | End: 2021-08-13

## 2021-08-12 RX ORDER — MEPERIDINE HYDROCHLORIDE 50 MG/ML
25 INJECTION INTRAMUSCULAR; INTRAVENOUS; SUBCUTANEOUS ONCE
Refills: 0 | Status: DISCONTINUED | OUTPATIENT
Start: 2021-08-12 | End: 2021-08-16

## 2021-08-12 RX ORDER — SODIUM CHLORIDE 9 MG/ML
1000 INJECTION INTRAMUSCULAR; INTRAVENOUS; SUBCUTANEOUS
Refills: 0 | Status: DISCONTINUED | OUTPATIENT
Start: 2021-08-12 | End: 2021-08-19

## 2021-08-12 RX ORDER — DEXTROSE 50 % IN WATER 50 %
50 SYRINGE (ML) INTRAVENOUS
Refills: 0 | Status: DISCONTINUED | OUTPATIENT
Start: 2021-08-12 | End: 2021-08-19

## 2021-08-12 RX ORDER — POLYETHYLENE GLYCOL 3350 17 G/17G
17 POWDER, FOR SOLUTION ORAL DAILY
Refills: 0 | Status: DISCONTINUED | OUTPATIENT
Start: 2021-08-12 | End: 2021-08-15

## 2021-08-12 RX ORDER — NITROGLYCERIN 6.5 MG
30 CAPSULE, EXTENDED RELEASE ORAL
Qty: 50 | Refills: 0 | Status: DISCONTINUED | OUTPATIENT
Start: 2021-08-12 | End: 2021-08-13

## 2021-08-12 RX ORDER — INSULIN HUMAN 100 [IU]/ML
10 INJECTION, SOLUTION SUBCUTANEOUS
Qty: 100 | Refills: 0 | Status: DISCONTINUED | OUTPATIENT
Start: 2021-08-12 | End: 2021-08-13

## 2021-08-12 RX ORDER — ACETAMINOPHEN 500 MG
1000 TABLET ORAL ONCE
Refills: 0 | Status: COMPLETED | OUTPATIENT
Start: 2021-08-12 | End: 2021-08-12

## 2021-08-12 RX ORDER — ASPIRIN/CALCIUM CARB/MAGNESIUM 324 MG
325 TABLET ORAL DAILY
Refills: 0 | Status: DISCONTINUED | OUTPATIENT
Start: 2021-08-12 | End: 2021-08-14

## 2021-08-12 RX ORDER — OXYCODONE HYDROCHLORIDE 5 MG/1
10 TABLET ORAL EVERY 4 HOURS
Refills: 0 | Status: DISCONTINUED | OUTPATIENT
Start: 2021-08-12 | End: 2021-08-14

## 2021-08-12 RX ORDER — FAMOTIDINE 10 MG/ML
20 INJECTION INTRAVENOUS EVERY 12 HOURS
Refills: 0 | Status: DISCONTINUED | OUTPATIENT
Start: 2021-08-12 | End: 2021-08-13

## 2021-08-12 RX ORDER — ASPIRIN/CALCIUM CARB/MAGNESIUM 324 MG
300 TABLET ORAL ONCE
Refills: 0 | Status: COMPLETED | OUTPATIENT
Start: 2021-08-12 | End: 2021-08-12

## 2021-08-12 RX ORDER — NOREPINEPHRINE BITARTRATE/D5W 8 MG/250ML
0.05 PLASTIC BAG, INJECTION (ML) INTRAVENOUS
Qty: 8 | Refills: 0 | Status: DISCONTINUED | OUTPATIENT
Start: 2021-08-12 | End: 2021-08-13

## 2021-08-12 RX ADMIN — Medication 6.68 MICROGRAM(S)/KG/MIN: at 15:31

## 2021-08-12 RX ADMIN — INSULIN HUMAN 10 UNIT(S)/HR: 100 INJECTION, SOLUTION SUBCUTANEOUS at 15:30

## 2021-08-12 RX ADMIN — Medication 400 MILLIGRAM(S): at 20:00

## 2021-08-12 RX ADMIN — Medication 100 MILLIGRAM(S): at 22:15

## 2021-08-12 RX ADMIN — Medication 100 MILLIGRAM(S): at 14:59

## 2021-08-12 RX ADMIN — CHLORHEXIDINE GLUCONATE 5 MILLILITER(S): 213 SOLUTION TOPICAL at 17:04

## 2021-08-12 RX ADMIN — Medication 50 GRAM(S): at 15:30

## 2021-08-12 RX ADMIN — DRONEDARONE 400 MILLIGRAM(S): 400 TABLET, FILM COATED ORAL at 05:27

## 2021-08-12 RX ADMIN — FENTANYL CITRATE 25 MICROGRAM(S): 50 INJECTION INTRAVENOUS at 22:45

## 2021-08-12 RX ADMIN — FENTANYL CITRATE 25 MICROGRAM(S): 50 INJECTION INTRAVENOUS at 23:00

## 2021-08-12 RX ADMIN — VASOPRESSIN 2.4 UNIT(S)/MIN: 20 INJECTION INTRAVENOUS at 15:31

## 2021-08-12 RX ADMIN — CHLORHEXIDINE GLUCONATE 15 MILLILITER(S): 213 SOLUTION TOPICAL at 06:36

## 2021-08-12 RX ADMIN — SODIUM CHLORIDE 20 MILLILITER(S): 9 INJECTION INTRAMUSCULAR; INTRAVENOUS; SUBCUTANEOUS at 15:30

## 2021-08-12 RX ADMIN — Medication 1000 MILLIGRAM(S): at 20:15

## 2021-08-12 RX ADMIN — FAMOTIDINE 20 MILLIGRAM(S): 10 INJECTION INTRAVENOUS at 17:04

## 2021-08-12 NOTE — PROGRESS NOTE ADULT - ASSESSMENT
ASSESSMENT & PLAN    Patient is a 82 yo M hx of CAD s/p PCI x 3 last 2010, Afib on Eliquis and HLD presented with chest pain found to have NSTEMI, found to have severe multivessel disease on cath (LAD 80% stenosis in L1, Mid 90% stenosis, left circumflex and ptox to have 90% stenosis in mid sigment after OM bifurcation, and % stenosis) awaiting CABG.     #NSTEMI 2/2 multivessel CAD  -CABG today, LUE precaution for possible radial artery harvest  -  Hold lovenox (70mg SubQ BID) starting 8/11, NPO midnight  -TTE on 8/9 showed EF: 62%  Troponin: 0.4 x2, then increased to 0.15  EKG with AFIB, no ST changes   Sublingual Nitro for continued CP     #Paroxysmal Atrial Fibrillation  EKG on admission with AFIB  Eliquis Switched to Heparin drip then now on therapeutic lovenox. Hold lovenox starting 8/11  Rate control with Multaq 400mg BID and Atenolol 40mg 25mg QD    #HTN:  - Hold amlodipine (10mg QD) starting 8/11. Continue atenolol and statin    #HLD  -atorvastatin 40mg QD      DVT PPX: Held Therapeutic lovenox 24 hours prior to procedure  GI PPX: pantoprazole BID  Full Code  Dispo: acute. to CT surgery   ASSESSMENT & PLAN    Patient is a 80 yo M hx of CAD s/p PCI x 3 last 2010, Afib on Eliquis and HLD presented with chest pain found to have NSTEMI, found to have severe multivessel disease on cath (LAD 80% stenosis in L1, Mid 90% stenosis, left circumflex and ptox to have 90% stenosis in mid sigment after OM bifurcation, and % stenosis) awaiting CABG.     #NSTEMI 2/2 multivessel CAD  -CABG today, LUE precaution for possible radial artery harvest  -  Hold lovenox (70mg SubQ BID) starting 8/11  -TTE on 8/9 showed EF: 62%  Troponin: 0.4 x2, then increased to 0.15  EKG with AFIB, no ST changes   Sublingual Nitro for continued CP     #Paroxysmal Atrial Fibrillation  EKG on admission with AFIB  Eliquis Switched to Heparin drip then now on therapeutic lovenox. Hold lovenox starting 8/11  Rate control with Multaq 400mg BID and Atenolol 40mg 25mg QD    #HTN:  - Hold amlodipine (10mg QD) starting 8/11. Continue atenolol and statin    #HLD  -atorvastatin 40mg QD      DVT PPX: Held Therapeutic lovenox 24 hours prior to procedure  GI PPX: pantoprazole BID  Full Code  Dispo: acute. to CT surgery

## 2021-08-12 NOTE — BRIEF OPERATIVE NOTE - NSICDXBRIEFPROCEDURE_GEN_ALL_CORE_FT
PROCEDURES:  CABG, 1 arterial and 3 venous 12-Aug-2021 13:06:34  Keon Smith  Closure of left atrial appendage using device 12-Aug-2021 13:07:19  Keon Smith

## 2021-08-12 NOTE — BRIEF OPERATIVE NOTE - NSICDXBRIEFPREOP_GEN_ALL_CORE_FT
PRE-OP DIAGNOSIS:  Non-ST elevation MI (NSTEMI) 12-Aug-2021 13:05:04  Keon Smith  Triple vessel coronary artery disease 12-Aug-2021 13:05:37  Keon Smith

## 2021-08-12 NOTE — DISCHARGE NOTE PROVIDER - CARE PROVIDERS DIRECT ADDRESSES
[email or web address],DirectAddress_Unknown

## 2021-08-12 NOTE — PACU DISCHARGE NOTE - COMMENTS
Patient remains intubated on levophed drip  VS: /78  HR 80 A Paced  RR 15 intubated  SO2: 100%  T: 34.2

## 2021-08-12 NOTE — BRIEF OPERATIVE NOTE - NSICDXBRIEFPOSTOP_GEN_ALL_CORE_FT
POST-OP DIAGNOSIS:  Triple vessel coronary artery disease 12-Aug-2021 13:06:10  Keon Smith  Non-ST elevation MI (NSTEMI) 12-Aug-2021 13:05:47  Keon Smith

## 2021-08-12 NOTE — DISCHARGE NOTE PROVIDER - NSDCFUADDINST_GEN_ALL_CORE_FT
please avoid any heavy lifting, pushing, or pulling anything > 10 lbs x 3 months; please no driving or sitting in the front seat or sleeping on side x 6 weeks; check temp and weight daily and shower daily

## 2021-08-12 NOTE — DISCHARGE NOTE PROVIDER - NSDCCPCAREPLAN_GEN_ALL_CORE_FT
PRINCIPAL DISCHARGE DIAGNOSIS  Diagnosis: ACS (acute coronary syndrome)  Assessment and Plan of Treatment:

## 2021-08-12 NOTE — DISCHARGE NOTE PROVIDER - CARE PROVIDER_API CALL
Keon Smith)  Surgery; Thoracic and Cardiac Surgery  [address], [address]  [place] [address]  Phone: [phone]  Fax: [phone]  Follow Up Time:     Grady Davis)  Cardiovascular Disease; Internal Medicine; Interventional Cardiology  [address]  [place] [address]  Phone: [phone]  Fax: [phone]  Follow Up Time:     VENU CARDENAS  [id number]  [facility] [place] [address]  Phone: ()-  Fax: ()-  Follow Up Time:

## 2021-08-12 NOTE — DISCHARGE NOTE PROVIDER - NSDCFUADDAPPT_GEN_ALL_CORE_FT
please follow up with dr. josue on wednesday 8/25 at 9:45 am.    please call cardiology for follow up appointment in 2 weeks and please call pmd for follow up in 2-4 weeks

## 2021-08-12 NOTE — PRE-OP CHECKLIST - BOWEL PREP
[facility]  [address]  [place] [address]  Phone: [phone]  Fax: [phone]                  Camille Flanagan   5/15/2017 1:15 PM   Routine Prenatal    Description:  Female : 1988   Provider:  Ritika Villeda CNM   Department:  [facility]           Reason for Visit     Routine Prenatal Visit                To Do List           Future Appointments        Provider Department Dept Phone    2017 1:30 PM Ritika Villeda CNM [facility]/ -[phone]      Goals (5 Years of Data)     None      [facility]     [facility] On Call Nurse Care Line -  Assistance  Unless otherwise directed by your provider, please contact [facility], our nurse care line that is available for  assistance.     Registered nurses in the [facility] On Call Center provide: appointment scheduling, clinical advisement, health education, and other advisory services.  Call: [phone] (toll free)               Medications           Message regarding Medications     Verify the changes and/or additions to your medication regime listed below are the same as discussed with your clinician today.  If any of these changes or additions are incorrect, please notify your healthcare provider.             Verify that the below list of medications is an accurate representation of the medications you are currently taking.  If none reported, the list may be blank. If incorrect, please contact your healthcare provider. Carry this list with you in case of emergency.           Current Medications     prenatal #108-iron,carbonyl-FA 30-1 mg Tab Take by mouth once daily.           Clinical Reference Information           Prenatal Vitals     Enc. Date GA Prenatal Vitals Prenatal Pulse Pain Level Urine Albumin/Glucose Edema Presentation Dilation/Effacement/Station    5/15/17 32w0d 120/82 / 76.9 kg (169 lb 8.5 oz)  / 156 / Present  0        17 30w4d 132/86 / 75 kg (165 lb 5.5 oz)  / 152 / Present  0  None /  None      17 28w1d 112/74 / 73 kg (161 lb)  / 158 / Present          3/21/17 24w1d 120/86 / 71 kg (156 lb 8.4 oz)  / 161 / Present  7        17 20w1d 130/70 / 67 kg (147 lb 11.3 oz)  / us / Present          17 16w1d 108/80 / 64.1 kg (141 lb 5 oz)  / 151 / Increased  0 Negative / Negative None / None      16 12w1d 112/60 / 62 kg (136 lb 11 oz)  / 175   Negative / Negative None / None      16 8w1d 116/74 / 60.8 kg (134 lb 0.6 oz)  / us 181 / Absent  0  None / None         TW.7 kg (43 lb 8.5 oz)   Pregravid weight: 57.2 kg (126 lb)       Your Vitals Were     BP Weight Last Period BMI       120/82 76.9 kg (169 lb 8.5 oz) 10/03/2016 31.01 kg/m2       Allergies as of 5/15/2017     No Known Allergies      Immunizations Administered on Date of Encounter - 5/15/2017     None      [facility] Sign-Up     Activating your [facility] account is as easy as 1-2-3!     1) Visit [email or web address], select Sign Up Now, enter this activation code and your date of birth, then select Next.  [email or web address]  Expires: 2017  1:22 PM      2) Create a username and password to use when you visit [facility] in the future and select a security question in case you lose your password and select Next.    3) Enter your e-mail address and click Sign Up!    Additional Information  If you have questions, please e-mail [email or web address] or call [phone] to talk to our [facility] staff. Remember, [facility] is NOT to be used for urgent needs. For medical emergencies, dial 911.         Language Assistance Services     ATTENTION: Language assistance services are available, free of charge. Please call [phone].      ATENCIÓN: Si habla español, tiene a haider disposición servicios gratuitos de asistencia lingüística. Da al [phone].     NADIA Ý: N?u b?n nói Ti?ng Vi?t, có các d?ch v? h? tr? ngôn ng? mi?n phí dành cho b?n. G?i s? [phone].         [facility] complies with applicable Federal civil  rights laws and does not discriminate on the basis of race, color, national origin, age, disability, or sex.         n/a

## 2021-08-12 NOTE — PROGRESS NOTE ADULT - SUBJECTIVE AND OBJECTIVE BOX
RADHA BAKER 81y Male  MRN#: [id number]   CODE STATUS: full code    Hospital Day: 4d    Pt is currently admitted with the primary diagnosis of acs, multivessel disease    SUBJECTIVE  Hospital Course    Overnight events: NPO since midnight    Subjective complaints     Present Today:   - Taylor:  No [ X ], Yes [   ] : Indication:     - Type of IV Access:       .. CVC/Piccline:  No [ X ], Yes [   ] : Indication:       .. Midline: No [ X ], Yes [   ] : Indication:                                             ----------------------------------------------------------  OBJECTIVE  PAST MEDICAL & SURGICAL HISTORY  CAD (coronary artery disease)    HLD (hyperlipidemia)    Hypertension                                              -----------------------------------------------------------  ALLERGIES:  No Known Allergies                                            ------------------------------------------------------------    HOME MEDICATIONS  Home Medications:  amLODIPine 10 mg oral tablet: 1 tab(s) orally once a day (08 Aug 2021 17:45)  atenolol 50 mg oral tablet: 1 tab(s) orally once a day (08 Aug 2021 17:45)  Eliquis 5 mg oral tablet: 1 tab(s) orally 2 times a day (08 Aug 2021 17:45)  Lipitor 40 mg oral tablet: 1 tab(s) orally once a day (08 Aug 2021 17:45)  Multaq 400 mg oral tablet: 1 tab(s) orally 2 times a day (08 Aug 2021 17:44)                           MEDICATIONS:  STANDING MEDICATIONS  albumin human  5% IVPB 3000 milliLiter(s) IV Intermittent once  albuterol/ipratropium for Nebulization 3 milliLiter(s) Nebulizer every 6 hours  aspirin enteric coated 81 milliGRAM(s) Oral daily  atorvastatin Oral Tab/Cap - Peds 40 milliGRAM(s) Oral at bedtime  chlorhexidine 0.12% Liquid 15 milliLiter(s) Swish and Spit once  dronedarone 400 milliGRAM(s) Oral two times a day  pantoprazole    Tablet 40 milliGRAM(s) Oral before breakfast  sodium chloride 0.9%. 1000 milliLiter(s) IV Continuous <Continuous>    PRN MEDICATIONS                                            ------------------------------------------------------------  VITAL SIGNS: Last 24 Hours  T(C): 36.1 (12 Aug 2021 05:47), Max: 36.1 (12 Aug 2021 05:47)  T(F): 97 (12 Aug 2021 05:47), Max: 97 (12 Aug 2021 05:47)  HR: 117 (12 Aug 2021 05:47) (61 - 117)  BP: 173/84 (12 Aug 2021 05:47) (143/74 - 173/84)  BP(mean): --  RR: 19 (12 Aug 2021 05:47) (16 - 20)  SpO2: 100% (11 Aug 2021 17:40) (100% - 100%)      08-10-21 @ 07:  -  21 @ 07:00  --------------------------------------------------------  IN: 1000 mL / OUT: 0 mL / NET: 1000 mL    21 @ 07:01  -  21 @ 06:40  --------------------------------------------------------  IN: 0 mL / OUT: 400 mL / NET: -400 mL                                             --------------------------------------------------------------  LABS:                        15.3   7.09  )-----------( 162      ( 12 Aug 2021 00:12 )             43.0     08-12    134<L>  |  99  |  13  ----------------------------<  92  4.1   |  25  |  0.9    Ca    9.3      12 Aug 2021 00:12  Mg     2.1     12    TPro  7.0  /  Alb  4.5  /  TBili  1.0  /  DBili  x   /  AST  35  /  ALT  25  /  AlkPhos  82  08-10    PT/INR - ( 12 Aug 2021 00:12 )   PT: 12.80 sec;   INR: 1.11 ratio         PTT - ( 12 Aug 2021 00:12 )  PTT:31.0 sec  Urinalysis Basic - ( 10 Aug 2021 17:29 )    Color: Light Yellow / Appearance: Clear / S.010 / pH: x  Gluc: x / Ketone: Negative  / Bili: Negative / Urobili: <2 mg/dL   Blood: x / Protein: Negative / Nitrite: Negative   Leuk Esterase: Negative / RBC: x / WBC x   Sq Epi: x / Non Sq Epi: x / Bacteria: x                                                            -------------------------------------------------------------  RADIOLOGY:                                            --------------------------------------------------------------    PHYSICAL EXAM:  General:   HEENT:  LUNGS:  HEART:  ABDOMEN:  EXT:  NEURO:  SKIN:                                           --------------------------------------------------------------         RADHA BAKER 81y Male  MRN#: [id number]   CODE STATUS: full code    Hospital Day: 4d    Pt is currently admitted with the primary diagnosis of acs, multivessel disease    SUBJECTIVE  Hospital Course    Overnight events: NPO since midnight. Going to OR. Denied chest pain, shortness of breath, abdominal pain    Subjective complaints     Present Today:   - Vazquez:  No [ X ], Yes [   ] : Indication:     - Type of IV Access:       .. CVC/Piccline:  No [ X ], Yes [   ] : Indication:       .. Midline: No [ X ], Yes [   ] : Indication:                                             ----------------------------------------------------------  OBJECTIVE  PAST MEDICAL & SURGICAL HISTORY  CAD (coronary artery disease)    HLD (hyperlipidemia)    Hypertension                                              -----------------------------------------------------------  ALLERGIES:  No Known Allergies                                            ------------------------------------------------------------    HOME MEDICATIONS  Home Medications:  amLODIPine 10 mg oral tablet: 1 tab(s) orally once a day (08 Aug 2021 17:45)  atenolol 50 mg oral tablet: 1 tab(s) orally once a day (08 Aug 2021 17:45)  Eliquis 5 mg oral tablet: 1 tab(s) orally 2 times a day (08 Aug 2021 17:45)  Lipitor 40 mg oral tablet: 1 tab(s) orally once a day (08 Aug 2021 17:45)  Multaq 400 mg oral tablet: 1 tab(s) orally 2 times a day (08 Aug 2021 17:44)                           MEDICATIONS:  STANDING MEDICATIONS  albumin human  5% IVPB 3000 milliLiter(s) IV Intermittent once  albuterol/ipratropium for Nebulization 3 milliLiter(s) Nebulizer every 6 hours  aspirin enteric coated 81 milliGRAM(s) Oral daily  atorvastatin Oral Tab/Cap - Peds 40 milliGRAM(s) Oral at bedtime  chlorhexidine 0.12% Liquid 15 milliLiter(s) Swish and Spit once  dronedarone 400 milliGRAM(s) Oral two times a day  pantoprazole    Tablet 40 milliGRAM(s) Oral before breakfast  sodium chloride 0.9%. 1000 milliLiter(s) IV Continuous <Continuous>    PRN MEDICATIONS                                            ------------------------------------------------------------  VITAL SIGNS: Last 24 Hours  T(C): 36.1 (12 Aug 2021 05:47), Max: 36.1 (12 Aug 2021 05:47)  T(F): 97 (12 Aug 2021 05:47), Max: 97 (12 Aug 2021 05:47)  HR: 117 (12 Aug 2021 05:47) (61 - 117)  BP: 173/84 (12 Aug 2021 05:47) (143/74 - 173/84)  BP(mean): --  RR: 19 (12 Aug 2021 05:47) (16 - 20)  SpO2: 100% (11 Aug 2021 17:40) (100% - 100%)      08-10-21 @ 07:  -  21 @ 07:00  --------------------------------------------------------  IN: 1000 mL / OUT: 0 mL / NET: 1000 mL    21 @ 07:01  -  21 @ 06:40  --------------------------------------------------------  IN: 0 mL / OUT: 400 mL / NET: -400 mL                                             --------------------------------------------------------------  LABS:                        15.3   7.09  )-----------( 162      ( 12 Aug 2021 00:12 )             43.0     08-12    134<L>  |  99  |  13  ----------------------------<  92  4.1   |  25  |  0.9    Ca    9.3      12 Aug 2021 00:12  Mg     2.1     08-12    TPro  7.0  /  Alb  4.5  /  TBili  1.0  /  DBili  x   /  AST  35  /  ALT  25  /  AlkPhos  82  08-10    PT/INR - ( 12 Aug 2021 00:12 )   PT: 12.80 sec;   INR: 1.11 ratio         PTT - ( 12 Aug 2021 00:12 )  PTT:31.0 sec  Urinalysis Basic - ( 10 Aug 2021 17:29 )    Color: Light Yellow / Appearance: Clear / S.010 / pH: x  Gluc: x / Ketone: Negative  / Bili: Negative / Urobili: <2 mg/dL   Blood: x / Protein: Negative / Nitrite: Negative   Leuk Esterase: Negative / RBC: x / WBC x   Sq Epi: x / Non Sq Epi: x / Bacteria: x                                                            -------------------------------------------------------------  RADIOLOGY:                                            --------------------------------------------------------------    PHYSICAL EXAM:  General: Well appearing, comfortable, not in acute distress  LUNGS: CTAB, no rales, on RA  HEART: RRR, no murmur  ABDOMEN: NBS, soft, nontender to palpation  EXT: no peripheral pitting edema                                            --------------------------------------------------------------

## 2021-08-12 NOTE — DISCHARGE NOTE PROVIDER - NSDCMRMEDTOKEN_GEN_ALL_CORE_FT
amLODIPine 10 mg oral tablet: 1 tab(s) orally once a day  atenolol 50 mg oral tablet: 1 tab(s) orally once a day  Eliquis 5 mg oral tablet: 1 tab(s) orally 2 times a day  Lipitor 40 mg oral tablet: 1 tab(s) orally once a day  Multaq 400 mg oral tablet: 1 tab(s) orally 2 times a day   aspirin 81 mg oral delayed release tablet: 1 tab(s) orally once a day  Eliquis 5 mg oral tablet: 1 tab(s) orally 2 times a day  metoprolol tartrate 25 mg oral tablet: 1 tab(s) orally every 8 hours  oxycodone-acetaminophen 5 mg-325 mg oral tablet: 1 tab(s) orally every 6 hours, As Needed -for moderate pain MDD:6   pantoprazole 40 mg oral delayed release tablet: 1 tab(s) orally once a day (before a meal)  tamsulosin 0.4 mg oral capsule: 1 cap(s) orally once a day (at bedtime)

## 2021-08-12 NOTE — BRIEF OPERATIVE NOTE - COMMENTS
Guru Wagner participated as the first assistant during the distal and proximal anastomosis in the absence of a qualified MD or fellow.

## 2021-08-12 NOTE — DISCHARGE NOTE PROVIDER - HOSPITAL COURSE
Patient is a 82 yo M hx of CAD s/p PCI x 3 (2001x1; 2010 x2), Afib on Eliquis (last dose 8/8), HTN and HLD. Patient presented c/o Syncope x2 associated with chest pain and palpitations.  He also noticed that he has CP with exertion which is new for him. He normally walks a lot but has needed to stop to rest while walking up the stairs due to CP. Patient ruled in NSTEMI w/peak trop 0.15 and subsequent cardiac cath revealed 3vcad. On 08/12/21, he underwent surgical myocardial revascularization. Post-operatively, Patient is a 80 yo M hx of CAD s/p PCI x 3 (2001x1; 2010 x2), Afib on Eliquis (last dose 8/8), HTN and HLD. Patient presented c/o Syncope x2 associated with chest pain and palpitations.  He also noticed that he has CP with exertion which is new for him. He normally walks a lot but has needed to stop to rest while walking up the stairs due to CP. Patient ruled in for NSTEMI w/peak trop 0.15 and subsequent cardiac cath revealed 3vcad. On 08/12/21, he underwent surgical myocardial revascularization. Post-operatively,  the patient had recurrent a. fib and converted to sinus rhythm pharmacologically.  He also developed diarrhea which was attributed to colchicine.  The diarrhea resolved once the colchicine was stopped.  He otherwise had an uneventful hospital course and was discharged home in stable condition on POD #7.  Statin therapy was stopped due to an increase in LFT's.  He was advised to have a repeat BMP prior to post op visit.

## 2021-08-12 NOTE — DISCHARGE NOTE PROVIDER - PROVIDER TOKENS
PROVIDER:[TOKEN:[[id number]]],PROVIDER:[TOKEN:[[id number]]],PROVIDER:[TOKEN:[[id number]]]

## 2021-08-13 LAB
ALBUMIN SERPL ELPH-MCNC: 4.5 G/DL — SIGNIFICANT CHANGE UP (ref 3.5–5.2)
ALP SERPL-CCNC: 33 U/L — SIGNIFICANT CHANGE UP (ref 30–115)
ALT FLD-CCNC: 26 U/L — SIGNIFICANT CHANGE UP (ref 0–41)
ANION GAP SERPL CALC-SCNC: 12 MMOL/L — SIGNIFICANT CHANGE UP (ref 7–14)
APTT BLD: 26.7 SEC — LOW (ref 27–39.2)
AST SERPL-CCNC: 31 U/L — SIGNIFICANT CHANGE UP (ref 0–41)
BASOPHILS # BLD AUTO: 0.01 K/UL — SIGNIFICANT CHANGE UP (ref 0–0.2)
BASOPHILS NFR BLD AUTO: 0.1 % — SIGNIFICANT CHANGE UP (ref 0–1)
BILIRUB SERPL-MCNC: 1.4 MG/DL — HIGH (ref 0.2–1.2)
BUN SERPL-MCNC: 10 MG/DL — SIGNIFICANT CHANGE UP (ref 10–20)
CALCIUM SERPL-MCNC: 7.9 MG/DL — LOW (ref 8.5–10.1)
CHLORIDE SERPL-SCNC: 103 MMOL/L — SIGNIFICANT CHANGE UP (ref 98–110)
CO2 SERPL-SCNC: 20 MMOL/L — SIGNIFICANT CHANGE UP (ref 17–32)
CREAT SERPL-MCNC: 0.7 MG/DL — SIGNIFICANT CHANGE UP (ref 0.7–1.5)
EOSINOPHIL # BLD AUTO: 0 K/UL — SIGNIFICANT CHANGE UP (ref 0–0.7)
EOSINOPHIL NFR BLD AUTO: 0 % — SIGNIFICANT CHANGE UP (ref 0–8)
GLUCOSE BLDC GLUCOMTR-MCNC: 108 MG/DL — HIGH (ref 70–99)
GLUCOSE BLDC GLUCOMTR-MCNC: 120 MG/DL — HIGH (ref 70–99)
GLUCOSE BLDC GLUCOMTR-MCNC: 127 MG/DL — HIGH (ref 70–99)
GLUCOSE BLDC GLUCOMTR-MCNC: 133 MG/DL — HIGH (ref 70–99)
GLUCOSE BLDC GLUCOMTR-MCNC: 141 MG/DL — HIGH (ref 70–99)
GLUCOSE BLDC GLUCOMTR-MCNC: 148 MG/DL — HIGH (ref 70–99)
GLUCOSE BLDC GLUCOMTR-MCNC: 152 MG/DL — HIGH (ref 70–99)
GLUCOSE BLDC GLUCOMTR-MCNC: 189 MG/DL — HIGH (ref 70–99)
GLUCOSE BLDC GLUCOMTR-MCNC: 189 MG/DL — HIGH (ref 70–99)
GLUCOSE BLDC GLUCOMTR-MCNC: 207 MG/DL — HIGH (ref 70–99)
GLUCOSE SERPL-MCNC: 137 MG/DL — HIGH (ref 70–99)
HCT VFR BLD CALC: 28.5 % — LOW (ref 42–52)
HGB BLD-MCNC: 10 G/DL — LOW (ref 14–18)
IMM GRANULOCYTES NFR BLD AUTO: 0.5 % — HIGH (ref 0.1–0.3)
INR BLD: 1.49 RATIO — HIGH (ref 0.65–1.3)
LYMPHOCYTES # BLD AUTO: 1.06 K/UL — LOW (ref 1.2–3.4)
LYMPHOCYTES # BLD AUTO: 9 % — LOW (ref 20.5–51.1)
MAGNESIUM SERPL-MCNC: 1.9 MG/DL — SIGNIFICANT CHANGE UP (ref 1.8–2.4)
MCHC RBC-ENTMCNC: 32.2 PG — HIGH (ref 27–31)
MCHC RBC-ENTMCNC: 35.1 G/DL — SIGNIFICANT CHANGE UP (ref 32–37)
MCV RBC AUTO: 91.6 FL — SIGNIFICANT CHANGE UP (ref 80–94)
MONOCYTES # BLD AUTO: 1.29 K/UL — HIGH (ref 0.1–0.6)
MONOCYTES NFR BLD AUTO: 10.9 % — HIGH (ref 1.7–9.3)
NEUTROPHILS # BLD AUTO: 9.4 K/UL — HIGH (ref 1.4–6.5)
NEUTROPHILS NFR BLD AUTO: 79.5 % — HIGH (ref 42.2–75.2)
NRBC # BLD: 0 /100 WBCS — SIGNIFICANT CHANGE UP (ref 0–0)
PLATELET # BLD AUTO: 115 K/UL — LOW (ref 130–400)
POTASSIUM SERPL-MCNC: 4.5 MMOL/L — SIGNIFICANT CHANGE UP (ref 3.5–5)
POTASSIUM SERPL-SCNC: 4.5 MMOL/L — SIGNIFICANT CHANGE UP (ref 3.5–5)
PROT SERPL-MCNC: 5.6 G/DL — LOW (ref 6–8)
PROTHROM AB SERPL-ACNC: 17.1 SEC — HIGH (ref 9.95–12.87)
RBC # BLD: 3.11 M/UL — LOW (ref 4.7–6.1)
RBC # FLD: 11.3 % — LOW (ref 11.5–14.5)
SODIUM SERPL-SCNC: 135 MMOL/L — SIGNIFICANT CHANGE UP (ref 135–146)
WBC # BLD: 11.82 K/UL — HIGH (ref 4.8–10.8)
WBC # FLD AUTO: 11.82 K/UL — HIGH (ref 4.8–10.8)

## 2021-08-13 PROCEDURE — 93010 ELECTROCARDIOGRAM REPORT: CPT

## 2021-08-13 PROCEDURE — 99232 SBSQ HOSP IP/OBS MODERATE 35: CPT

## 2021-08-13 PROCEDURE — 71045 X-RAY EXAM CHEST 1 VIEW: CPT | Mod: 26,77

## 2021-08-13 PROCEDURE — 99233 SBSQ HOSP IP/OBS HIGH 50: CPT

## 2021-08-13 PROCEDURE — 71045 X-RAY EXAM CHEST 1 VIEW: CPT | Mod: 26

## 2021-08-13 RX ORDER — METOPROLOL TARTRATE 50 MG
12.5 TABLET ORAL EVERY 12 HOURS
Refills: 0 | Status: DISCONTINUED | OUTPATIENT
Start: 2021-08-13 | End: 2021-08-14

## 2021-08-13 RX ORDER — SODIUM CHLORIDE 9 MG/ML
1000 INJECTION, SOLUTION INTRAVENOUS
Refills: 0 | Status: DISCONTINUED | OUTPATIENT
Start: 2021-08-13 | End: 2021-08-19

## 2021-08-13 RX ORDER — DILTIAZEM HCL 120 MG
10 CAPSULE, EXT RELEASE 24 HR ORAL ONCE
Refills: 0 | Status: COMPLETED | OUTPATIENT
Start: 2021-08-13 | End: 2021-08-13

## 2021-08-13 RX ORDER — ACETAMINOPHEN 500 MG
1000 TABLET ORAL ONCE
Refills: 0 | Status: DISCONTINUED | OUTPATIENT
Start: 2021-08-13 | End: 2021-08-19

## 2021-08-13 RX ORDER — IBUTILIDE FUMARATE 0.1 MG/ML
1 INJECTION, SOLUTION INTRAVENOUS ONCE
Refills: 0 | Status: COMPLETED | OUTPATIENT
Start: 2021-08-13 | End: 2021-08-13

## 2021-08-13 RX ORDER — FENTANYL CITRATE 50 UG/ML
25 INJECTION INTRAVENOUS ONCE
Refills: 0 | Status: DISCONTINUED | OUTPATIENT
Start: 2021-08-13 | End: 2021-08-13

## 2021-08-13 RX ORDER — MAGNESIUM SULFATE 500 MG/ML
2 VIAL (ML) INJECTION ONCE
Refills: 0 | Status: COMPLETED | OUTPATIENT
Start: 2021-08-13 | End: 2021-08-13

## 2021-08-13 RX ORDER — KETOROLAC TROMETHAMINE 30 MG/ML
15 SYRINGE (ML) INJECTION EVERY 4 HOURS
Refills: 0 | Status: DISCONTINUED | OUTPATIENT
Start: 2021-08-13 | End: 2021-08-18

## 2021-08-13 RX ORDER — INSULIN LISPRO 100/ML
VIAL (ML) SUBCUTANEOUS
Refills: 0 | Status: DISCONTINUED | OUTPATIENT
Start: 2021-08-13 | End: 2021-08-19

## 2021-08-13 RX ORDER — GLUCAGON INJECTION, SOLUTION 0.5 MG/.1ML
1 INJECTION, SOLUTION SUBCUTANEOUS ONCE
Refills: 0 | Status: DISCONTINUED | OUTPATIENT
Start: 2021-08-13 | End: 2021-08-19

## 2021-08-13 RX ORDER — ACETAMINOPHEN 500 MG
1000 TABLET ORAL ONCE
Refills: 0 | Status: COMPLETED | OUTPATIENT
Start: 2021-08-13 | End: 2021-08-13

## 2021-08-13 RX ORDER — AMIODARONE HYDROCHLORIDE 400 MG/1
150 TABLET ORAL ONCE
Refills: 0 | Status: COMPLETED | OUTPATIENT
Start: 2021-08-13 | End: 2021-08-13

## 2021-08-13 RX ORDER — MAGNESIUM SULFATE 500 MG/ML
1 VIAL (ML) INJECTION EVERY 12 HOURS
Refills: 0 | Status: COMPLETED | OUTPATIENT
Start: 2021-08-13 | End: 2021-08-15

## 2021-08-13 RX ORDER — PROCAINAMIDE HCL 500 MG
1000 TABLET, EXTENDED RELEASE ORAL ONCE
Refills: 0 | Status: COMPLETED | OUTPATIENT
Start: 2021-08-13 | End: 2021-08-13

## 2021-08-13 RX ORDER — DIGOXIN 250 MCG
500 TABLET ORAL ONCE
Refills: 0 | Status: COMPLETED | OUTPATIENT
Start: 2021-08-13 | End: 2021-08-13

## 2021-08-13 RX ORDER — DILTIAZEM HCL 120 MG
15 CAPSULE, EXT RELEASE 24 HR ORAL ONCE
Refills: 0 | Status: COMPLETED | OUTPATIENT
Start: 2021-08-13 | End: 2021-08-13

## 2021-08-13 RX ORDER — ATORVASTATIN CALCIUM 80 MG/1
40 TABLET, FILM COATED ORAL AT BEDTIME
Refills: 0 | Status: DISCONTINUED | OUTPATIENT
Start: 2021-08-13 | End: 2021-08-18

## 2021-08-13 RX ORDER — ENOXAPARIN SODIUM 100 MG/ML
40 INJECTION SUBCUTANEOUS DAILY
Refills: 0 | Status: DISCONTINUED | OUTPATIENT
Start: 2021-08-13 | End: 2021-08-14

## 2021-08-13 RX ORDER — DEXTROSE 50 % IN WATER 50 %
15 SYRINGE (ML) INTRAVENOUS ONCE
Refills: 0 | Status: DISCONTINUED | OUTPATIENT
Start: 2021-08-13 | End: 2021-08-19

## 2021-08-13 RX ORDER — DRONEDARONE 400 MG/1
400 TABLET, FILM COATED ORAL
Refills: 0 | Status: DISCONTINUED | OUTPATIENT
Start: 2021-08-13 | End: 2021-08-13

## 2021-08-13 RX ORDER — FENTANYL CITRATE 50 UG/ML
25 INJECTION INTRAVENOUS ONCE
Refills: 0 | Status: DISCONTINUED | OUTPATIENT
Start: 2021-08-13 | End: 2021-08-12

## 2021-08-13 RX ORDER — METOPROLOL TARTRATE 50 MG
5 TABLET ORAL ONCE
Refills: 0 | Status: COMPLETED | OUTPATIENT
Start: 2021-08-13 | End: 2021-08-13

## 2021-08-13 RX ORDER — PANTOPRAZOLE SODIUM 20 MG/1
40 TABLET, DELAYED RELEASE ORAL
Refills: 0 | Status: DISCONTINUED | OUTPATIENT
Start: 2021-08-13 | End: 2021-08-19

## 2021-08-13 RX ORDER — AMIODARONE HYDROCHLORIDE 400 MG/1
1 TABLET ORAL
Qty: 900 | Refills: 0 | Status: DISCONTINUED | OUTPATIENT
Start: 2021-08-13 | End: 2021-08-14

## 2021-08-13 RX ADMIN — Medication 50 GRAM(S): at 06:42

## 2021-08-13 RX ADMIN — Medication 15 MILLIGRAM(S): at 17:27

## 2021-08-13 RX ADMIN — OXYCODONE HYDROCHLORIDE 10 MILLIGRAM(S): 5 TABLET ORAL at 05:39

## 2021-08-13 RX ADMIN — FENTANYL CITRATE 25 MICROGRAM(S): 50 INJECTION INTRAVENOUS at 06:45

## 2021-08-13 RX ADMIN — ENOXAPARIN SODIUM 40 MILLIGRAM(S): 100 INJECTION SUBCUTANEOUS at 11:11

## 2021-08-13 RX ADMIN — AMIODARONE HYDROCHLORIDE 33.3 MG/MIN: 400 TABLET ORAL at 19:00

## 2021-08-13 RX ADMIN — Medication 325 MILLIGRAM(S): at 11:11

## 2021-08-13 RX ADMIN — IBUTILIDE FUMARATE 360 MILLIGRAM(S): 0.1 INJECTION, SOLUTION INTRAVENOUS at 21:33

## 2021-08-13 RX ADMIN — OXYCODONE HYDROCHLORIDE 10 MILLIGRAM(S): 5 TABLET ORAL at 06:09

## 2021-08-13 RX ADMIN — Medication 2: at 17:12

## 2021-08-13 RX ADMIN — AMIODARONE HYDROCHLORIDE 600 MILLIGRAM(S): 400 TABLET ORAL at 18:25

## 2021-08-13 RX ADMIN — FENTANYL CITRATE 25 MICROGRAM(S): 50 INJECTION INTRAVENOUS at 06:30

## 2021-08-13 RX ADMIN — Medication 318 MILLIGRAM(S): at 16:00

## 2021-08-13 RX ADMIN — Medication 500 MICROGRAM(S): at 17:00

## 2021-08-13 RX ADMIN — Medication 15 MILLIGRAM(S): at 08:40

## 2021-08-13 RX ADMIN — FAMOTIDINE 20 MILLIGRAM(S): 10 INJECTION INTRAVENOUS at 05:17

## 2021-08-13 RX ADMIN — Medication 5 MILLIGRAM(S): at 19:00

## 2021-08-13 RX ADMIN — Medication 520 MILLIGRAM(S): at 16:15

## 2021-08-13 RX ADMIN — Medication 15 MILLIGRAM(S): at 17:57

## 2021-08-13 RX ADMIN — Medication 15 MILLIGRAM(S): at 08:55

## 2021-08-13 RX ADMIN — POLYETHYLENE GLYCOL 3350 17 GRAM(S): 17 POWDER, FOR SOLUTION ORAL at 11:11

## 2021-08-13 RX ADMIN — CHLORHEXIDINE GLUCONATE 5 MILLILITER(S): 213 SOLUTION TOPICAL at 05:17

## 2021-08-13 RX ADMIN — Medication 400 MILLIGRAM(S): at 06:15

## 2021-08-13 RX ADMIN — Medication 100 MILLIGRAM(S): at 05:17

## 2021-08-13 RX ADMIN — IBUTILIDE FUMARATE 360 MILLIGRAM(S): 0.1 INJECTION, SOLUTION INTRAVENOUS at 23:12

## 2021-08-13 RX ADMIN — Medication 100 GRAM(S): at 21:33

## 2021-08-13 RX ADMIN — ATORVASTATIN CALCIUM 40 MILLIGRAM(S): 80 TABLET, FILM COATED ORAL at 21:33

## 2021-08-13 RX ADMIN — Medication 10 MILLIGRAM(S): at 16:05

## 2021-08-13 RX ADMIN — Medication 1000 MILLIGRAM(S): at 06:30

## 2021-08-13 NOTE — PROGRESS NOTE ADULT - SUBJECTIVE AND OBJECTIVE BOX
CTU Attending Progress Daily Note     13 Aug 2021 20:47  POD# - 1 CABG   He has history of CAD (coronary artery disease)    HLD (hyperlipidemia)    Hypertension      Interval event for past 24 hr:  RADHA BAKER  81y had no event.   Current Complains:  RADHA BAKER has no new complains  HPI:  Patient is a 80 yo M hx of CAD s/p PCI x 3 last 2010, Afib on Eliquis and HLD presented with cc of chest pain. Chest pain started  last night around 11pm, described as heavy pressure, 6/10, with radiation to left arm associated with Palpitations. Patient follows with a cardiologist in [place] and was told if this occurs he should take an extra dose of his medications so around 2am he took an extra dose of Multaq (Extra 400mg) and atenolol(extra 50mg)  and took his morning dose. However, he came to the ED today because his is still having the CP and palpitations. He also noticed that he has CP with exertion which is new for him. He normally walks a lot but has needed to stop to rest while walking up the stairs due to CP. No LE swelling. No nausea, vomiting, abdominal pain, fevers or chills.    IN ED: Pt was hypotensive to 90's with improvement to 100's  initially 90s, bradycardic to  HR 50's   Labs: Trop: 0.04 x 2, EKG: Afib, no ST changes      (08 Aug 2021 17:18)    OBJECTIVE:  ICU Vital Signs Last 24 Hrs  T(C): 37.6 (13 Aug 2021 20:00), Max: 38.2 (13 Aug 2021 13:30)  T(F): 99.7 (13 Aug 2021 20:00), Max: 100.8 (13 Aug 2021 13:30)  HR: 112 (13 Aug 2021 20:00) (89 - 172)  BP: 95/58 (13 Aug 2021 19:15) (82/43 - 115/67)  BP(mean): 72 (13 Aug 2021 19:15) (56 - 85)  ABP: 93/56 (13 Aug 2021 20:00) (75/32 - 143/55)  ABP(mean): 67 (13 Aug 2021 20:00) (48 - 102)  RR: 26 (13 Aug 2021 20:00) (4 - 39)  SpO2: 97% (13 Aug 2021 20:00) (93% - 100%)    I&O's Summary    12 Aug 2021 07:01  -  13 Aug 2021 07:00  --------------------------------------------------------  IN: 1631.4 mL / OUT: 2550 mL / NET: -918.6 mL    13 Aug 2021 07:01  -  13 Aug 2021 20:47  --------------------------------------------------------  IN: 1484 mL / OUT: 825 mL / NET: 659 mL      I&O's Detail    12 Aug 2021 07:01  -  13 Aug 2021 07:00  --------------------------------------------------------  IN:    Albumin 5%  - 500 mL: 500 mL    Dexmedetomidine: 42.6 mL    Insulin: 21 mL    IV PiggyBack: 400 mL    Norepinephrine: 33 mL    Oral Fluid: 240 mL    sodium chloride 0.9%: 360 mL    Vasopressin: 34.8 mL  Total IN: 1631.4 mL    OUT:    Chest Tube (mL): 300 mL    Chest Tube (mL): 140 mL    Chest Tube (mL): 190 mL    Indwelling Catheter - Urethral (mL): 1920 mL    Nitroglycerin: 0 mL    Propofol: 0 mL  Total OUT: 2550 mL    Total NET: -918.6 mL      13 Aug 2021 07:01  -  13 Aug 2021 20:47  --------------------------------------------------------  IN:    Insulin: 14 mL    IV PiggyBack: 450 mL    Oral Fluid: 880 mL    sodium chloride 0.9%: 140 mL  Total IN: 1484 mL    OUT:    Chest Tube (mL): 0 mL    Chest Tube (mL): 140 mL    Chest Tube (mL): 180 mL    Indwelling Catheter - Urethral (mL): 505 mL  Total OUT: 825 mL    Total NET: 659 mL        Adult Advanced Hemodynamics Last 24 Hrs  CVP(mm Hg): 2 (13 Aug 2021 09:00) (2 - 37)  CVP(cm H2O): --  CO: 6.2 (13 Aug 2021 09:00) (5 - 6.2)  CI: 3.5 (13 Aug 2021 09:00) (2.8 - 3.5)  PA: 25/8 (13 Aug 2021 09:00) (-2/-19 - 38/22)  PA(mean): 15 (13 Aug 2021 09:00) (15 - 29)  PCWP: --  SVR: 863 (13 Aug 2021 09:00) (862 - 1081)  SVRI: 1529 (13 Aug 2021 09:00) (1529 - 1901)  PVR: --  PVRI: --    CAPILLARY BLOOD GLUCOSE      POCT Blood Glucose.: 189 mg/dL (13 Aug 2021 16:13)  POCT Blood Glucose.: 189 mg/dL (13 Aug 2021 12:48)  POCT Blood Glucose.: 152 mg/dL (13 Aug 2021 11:17)  POCT Blood Glucose.: 148 mg/dL (13 Aug 2021 10:04)  POCT Blood Glucose.: 133 mg/dL (13 Aug 2021 08:27)  POCT Blood Glucose.: 127 mg/dL (13 Aug 2021 06:03)  POCT Blood Glucose.: 108 mg/dL (13 Aug 2021 04:00)  POCT Blood Glucose.: 120 mg/dL (13 Aug 2021 02:20)  POCT Blood Glucose.: 141 mg/dL (13 Aug 2021 01:05)  POCT Blood Glucose.: 119 mg/dL (12 Aug 2021 23:19)  POCT Blood Glucose.: 138 mg/dL (12 Aug 2021 22:39)  POCT Blood Glucose.: 94 mg/dL (12 Aug 2021 21:21)    LABS:  ABG - ( 13 Aug 2021 03:48 )  pH, Arterial: 7.38  pH, Blood: x     /  pCO2: 40    /  pO2: 102   / HCO3: 24    / Base Excess: -1.0  /  SaO2: 98                                      10.0   11.82 )-----------( 115      ( 13 Aug 2021 01:50 )             28.5     08-13    135  |  103  |  10  ----------------------------<  137<H>  4.5   |  20  |  0.7    Ca    7.9<L>      13 Aug 2021 01:50  Mg     1.9     08-13    TPro  5.6<L>  /  Alb  4.5  /  TBili  1.4<H>  /  DBili  x   /  AST  31  /  ALT  26  /  AlkPhos  33  08-13    PT/INR - ( 13 Aug 2021 01:50 )   PT: 17.10 sec;   INR: 1.49 ratio         PTT - ( 13 Aug 2021 01:50 )  PTT:26.7 sec      Home Medications:  amLODIPine 10 mg oral tablet: 1 tab(s) orally once a day (08 Aug 2021 17:45)  atenolol 50 mg oral tablet: 1 tab(s) orally once a day (08 Aug 2021 17:45)  Eliquis 5 mg oral tablet: 1 tab(s) orally 2 times a day (08 Aug 2021 17:45)  Lipitor 40 mg oral tablet: 1 tab(s) orally once a day (08 Aug 2021 17:45)  Multaq 400 mg oral tablet: 1 tab(s) orally 2 times a day (08 Aug 2021 17:44)    HOSPITAL MEDICATIONS:  MEDICATIONS  (STANDING):  acetaminophen  IVPB .. 1000 milliGRAM(s) IV Intermittent once  aMIOdarone Infusion 1 mG/Min (33.3 mL/Hr) IV Continuous <Continuous>  aspirin enteric coated 325 milliGRAM(s) Oral daily  atorvastatin Oral Tab/Cap - Peds 40 milliGRAM(s) Oral at bedtime  chlorhexidine 0.12% Liquid 5 milliLiter(s) Oral Mucosa two times a day  dextrose 40% Gel 15 Gram(s) Oral once  dextrose 5%. 1000 milliLiter(s) (50 mL/Hr) IV Continuous <Continuous>  dextrose 5%. 1000 milliLiter(s) (100 mL/Hr) IV Continuous <Continuous>  dextrose 50% Injectable 50 milliLiter(s) IV Push every 15 minutes  dextrose 50% Injectable 25 milliLiter(s) IV Push every 15 minutes  enoxaparin Injectable 40 milliGRAM(s) SubCutaneous daily  glucagon  Injectable 1 milliGRAM(s) IntraMuscular once  ibutilide IVPB 1 milliGRAM(s) IV Intermittent once  insulin lispro (ADMELOG) corrective regimen sliding scale   SubCutaneous three times a day before meals  magnesium sulfate  IVPB 1 Gram(s) IV Intermittent every 12 hours  meperidine     Injectable 25 milliGRAM(s) IV Push once  metoprolol tartrate 12.5 milliGRAM(s) Oral every 12 hours  pantoprazole    Tablet 40 milliGRAM(s) Oral before breakfast  polyethylene glycol 3350 17 Gram(s) Oral daily  sodium chloride 0.9%. 1000 milliLiter(s) (20 mL/Hr) IV Continuous <Continuous>    MEDICATIONS  (PRN):  ketorolac   Injectable 15 milliGRAM(s) IV Push every 4 hours PRN Moderate Pain (4 - 6)  oxyCODONE    IR 10 milliGRAM(s) Oral every 4 hours PRN Severe Pain (7 - 10)  oxyCODONE    IR 5 milliGRAM(s) Oral every 6 hours PRN Moderate Pain (4 - 6)      REVIEW OF SYSTEMS:  CONSTITUTIONAL: [X] all negative; [ ] weakness, [ ] fevers, [ ] chills  EYES/ENT: [X] all negative; [ ] visual changes, [ ] vertigo, [ ] throat pain   NECK: [X] all negative; [ ] pain, [ ] stiffness  RESPIRATORY: [] all negative, [ ] cough, [ ] wheezing, [ ] hemoptysis, [ ] shortness of breath  CARDIOVASCULAR: [] all negative; [ ] chest pain, [ ] palpitations, [ ] orthopnea  GASTROINTESTINAL: [X] all negative; [ ]abdominal pain, [ ] nausea, [ ] vomiting, [ ] hematemesis, [ ] diarrhea, [ ] constipation, [ ] melena, [ ] hematochezia.  GENITOURINARY: [X] all negative; [ ] dysuria, [ ] frequency, [ ] hematuria  NEUROLOGICAL: [X] all negative; [ ] numbness, [ ] weakness  SKIN: [X] all negative; [ ] itching, [ ] burning, [ ] rashes, [ ] lesions   All other review of systems is negative unless indicated above.    [  ] Unable to assess ROS because     PHYSICAL EXAM:          CONSTITUTIONAL: Well-developed; well-nourished; in no acute distress.   	SKIN: warm, dry  	HEAD: Normocephalic; atraumatic.  	EYES: PERRL, EOM, no conj injection, sclera clear  	ENT: No nasal discharge; airway clear.  	NECK: Supple; non tender.  No midline ttp ctls  	CARD: S1, S2 normal; no murmurs, gallops, or rubs. Regular rate and rhythm. 2+ RPs and DPs bilat, no carotid bruits, no pedal   edema, no calf pain b/l  	RESP: CTA  bilat good air movement No wheezes, rales or rhonchi.  	ABD: Soft, not tender, not distended, no CVA ttp no rebound or guarding, bowel sounds present  	EXT: Normal ROM.  No clubbing, cyanosis or edema.   	  	NEURO: Alert, awake, motor 5/5 R, 5/5 L        RADIOLOGY:  xray  < from: Xray Chest 1 View-PORTABLE IMMEDIATE (Xray Chest 1 View-PORTABLE IMMEDIATE .) (08.13.21 @ 14:44) >    IMPRESSION:  Interval removal of bilateral chest tubes, no pneumothorax.    --- End of Report ---    < end of copied text >    I spent 45 minutes of critical care time examining patient, reviewing vitals, labs, medications, imaging and discussing with the team goals of care to prevent life-threatening in this patient who is at high risk for deterioration or death due to:

## 2021-08-13 NOTE — PROGRESS NOTE ADULT - ASSESSMENT
`Assessment/Plan:  81y Male status-post  GABGx4 w/ATILIO clip                 POD # 1    - Continue CTU supportive care and ongoing plan of care as per continuing CTU rounds.   - Continue DVT/GI prophylaxis  - Incentive Spirometry 10 times an hour  - Continue to advance physical activity as tolerated and continue PT/OT as directed  1. CAD s/p CABG: Continue ASA, statin, BB, Pain control as needed   2. Pre-op A-fib: Cont dronedarone 400 milliGRAM(s) again in afib now procainmide bolnemesio correab amio drip satrted  d/c multag can be restared on dischrge   3. COPD/Hypoxia: Cont Ipratropium, wean supplemental O2 as tolerated and encourage incentive spirometer.   4. DM/Glucose Control: A1c 5.6%, fs well controlled; d/c insulin infusion and cont RISS.    `Assessment/Plan:  81y Male status-post  GABGx4 w/ATILIO clip                 POD # 1    - Continue CTU supportive care and ongoing plan of care as per continuing CTU rounds.   - Continue DVT/GI prophylaxis  - Incentive Spirometry 10 times an hour  - Continue to advance physical activity as tolerated and continue PT/OT as directed  1. CAD s/p CABG: Continue ASA, statin, BB, Pain control as needed   2. Pre-op A-fib: Cont dronedarone 400 milliGRAM(s) again in afib now procainmide bolous giveb amio drip satrted  d/c multag can be restared on dischrge   3. COPD/Hypoxia: Cont Ipratropium, wean supplemental O2 as tolerated and encourage incentive spirometer.   4. DM/Glucose Control: A1c 5.6%, fs well controlled; d/c insulin infusion and cont RISS.   5- anemia of acute blood loss monitor CBC daily  6- thrombocytpenia monitor plt daily   `Assessment/Plan:  81y Male status-post  GABGx4 w/ATILIO clip                 POD # 1 CABG x4   NSTMi    mi on asa BB sattin  CAD    - Continue CTU supportive care and ongoing plan of care as per continuing CTU rounds.   - Continue DVT/GI prophylaxis  - Incentive Spirometry 10 times an hour  - Continue to advance physical activity as tolerated and continue PT/OT as directed  1. CAD s/p CABG: Continue ASA, statin, BB, Pain control as needed   2. Pre-op A-fib: Cont dronedarone 400 milliGRAM(s) again in afib now procainmide bolous giveb amio drip satrted  d/c multag can be restared on dischrge   3. COPD/Hypoxia: Cont Ipratropium, wean supplemental O2 as tolerated and encourage incentive spirometer.   4. DM/Glucose Control: A1c 5.6%, fs well controlled; d/c insulin infusion and cont RISS.   5- anemia of acute blood loss monitor CBC daily  6- thrombocytpenia monitor plt daily

## 2021-08-13 NOTE — PROGRESS NOTE ADULT - ASSESSMENT
Multivessel disease  S/p CABG  Off pressors  Recovering well.    C/w pain control  C/w ASA. Re-start statin  Will start bb once safe from surgical/CCM perspective.

## 2021-08-13 NOTE — PHYSICAL THERAPY INITIAL EVALUATION ADULT - ADDITIONAL COMMENTS
Patient lives with family in private home, 6 steps to enter ans 12 steps to bedroom. Patient claims to be independent in ADL's and ambulation without assistive device.

## 2021-08-13 NOTE — PROGRESS NOTE ADULT - SUBJECTIVE AND OBJECTIVE BOX
Patient is a 81y old  Male who presents with a chief complaint of Chest pain (12 Aug 2021 15:02)          SUBJ:  Patient seen and examined. S/p CABG yesterday. Off drips. Clinically doing well.      MEDICATIONS  (STANDING):  ALBUTerol    90 MICROgram(s) HFA Inhaler 2 Puff(s) Inhalation every 6 hours  aspirin enteric coated 325 milliGRAM(s) Oral daily  chlorhexidine 0.12% Liquid 5 milliLiter(s) Oral Mucosa two times a day  dexMEDEtomidine Infusion 0.1 MICROgram(s)/kG/Hr (1.78 mL/Hr) IV Continuous <Continuous>  dextrose 50% Injectable 50 milliLiter(s) IV Push every 15 minutes  dextrose 50% Injectable 25 milliLiter(s) IV Push every 15 minutes  famotidine Injectable 20 milliGRAM(s) IV Push every 12 hours  insulin regular Infusion 10 Unit(s)/Hr (10 mL/Hr) IV Continuous <Continuous>  ipratropium 17 MICROgram(s) HFA Inhaler 2 Puff(s) Inhalation every 6 hours  meperidine     Injectable 25 milliGRAM(s) IV Push once  nitroglycerin  Infusion 30 MICROgram(s)/Min (9 mL/Hr) IV Continuous <Continuous>  norepinephrine Infusion 0.05 MICROgram(s)/kG/Min (6.68 mL/Hr) IV Continuous <Continuous>  polyethylene glycol 3350 17 Gram(s) Oral daily  propofol Infusion 20 MICROgram(s)/kG/Min (8.54 mL/Hr) IV Continuous <Continuous>  sodium chloride 0.9%. 1000 milliLiter(s) (50 mL/Hr) IV Continuous <Continuous>  sodium chloride 0.9%. 1000 milliLiter(s) (20 mL/Hr) IV Continuous <Continuous>  vasopressin Infusion 0.04 Unit(s)/Min (2.4 mL/Hr) IV Continuous <Continuous>    MEDICATIONS  (PRN):  oxyCODONE    IR 10 milliGRAM(s) Oral every 4 hours PRN Severe Pain (7 - 10)  oxyCODONE    IR 5 milliGRAM(s) Oral every 6 hours PRN Moderate Pain (4 - 6)            Vital Signs Last 24 Hrs  T(C): 37.5 (13 Aug 2021 07:00), Max: 37.9 (12 Aug 2021 19:00)  T(F): 99.5 (13 Aug 2021 07:00), Max: 100.2 (12 Aug 2021 19:00)  HR: 90 (13 Aug 2021 07:15) (89 - 90)  BP: 109/65 (13 Aug 2021 06:00) (89/55 - 115/67)  BP(mean): 80 (13 Aug 2021 06:00) (66 - 85)  RR: 14 (13 Aug 2021 07:15) (8 - 22)  SpO2: 98% (13 Aug 2021 07:15) (95% - 100%)      PHYSICAL EXAM:    GEN: AAO x 3, NAD  HEENT: NC/AT, PERRL  Neck: No JVD, Rt. IJ Diboll  CV: Reg, S1-S2, no murmur  Lungs: CTAB  Abd: Soft, non-tender  Ext: No edema        08-12-21 @ 07:01  -  08-13-21 @ 07:00  --------------------------------------------------------  IN: 1631.4 mL / OUT: 2550 mL / NET: -918.6 mL    08-13-21 @ 07:01  -  08-13-21 @ 08:30  --------------------------------------------------------  IN: 41 mL / OUT: 70 mL / NET: -29 mL        I&O's Summary    12 Aug 2021 07:01  -  13 Aug 2021 07:00  --------------------------------------------------------  IN: 1631.4 mL / OUT: 2550 mL / NET: -918.6 mL    13 Aug 2021 07:01  -  13 Aug 2021 08:30  --------------------------------------------------------  IN: 41 mL / OUT: 70 mL / NET: -29 mL    	            LABS:                        10.0   11.82 )-----------( 115      ( 13 Aug 2021 01:50 )             28.5     08-13    135  |  103  |  10  ----------------------------<  137<H>  4.5   |  20  |  0.7    Ca    7.9<L>      13 Aug 2021 01:50  Mg     1.9     08-13    TPro  5.6<L>  /  Alb  4.5  /  TBili  1.4<H>  /  DBili  x   /  AST  31  /  ALT  26  /  AlkPhos  33  08-13        PT/INR - ( 13 Aug 2021 01:50 )   PT: 17.10 sec;   INR: 1.49 ratio         PTT - ( 13 Aug 2021 01:50 )  PTT:26.7 sec      BNP  RADIOLOGY & ADDITIONAL STUDIES:      IMPRESSION AND PLAN:

## 2021-08-13 NOTE — PHYSICAL THERAPY INITIAL EVALUATION ADULT - GENERAL OBSERVATIONS, REHAB EVAL
Patient encountered lying in bed. (+) central line, (+) O2 via NC @ 3LPM, (+) L radial arterial line, (+) latham catheter, (+) chest tube. Agreed to participate in therapy.

## 2021-08-13 NOTE — PROGRESS NOTE ADULT - SUBJECTIVE AND OBJECTIVE BOX
OPERATIVE PROCEDURE(s): s/p GABG                POD # 2                       SURGEON(s): TEENA García  SUBJECTIVE ASSESSMENT: 81yMale patient seen and examined at bedside. Pt appears comfortable, denies any abnormal complaints.     Vital Signs Last 24 Hrs  T(F): 99.5 (13 Aug 2021 09:00), Max: 100.2 (12 Aug 2021 19:00)  HR: 90 (13 Aug 2021 11:45) (89 - 90)  BP: 104/57 (13 Aug 2021 10:00) (89/55 - 115/67)  BP(mean): 73 (13 Aug 2021 10:00) (66 - 85)  ABP: 127/42 (13 Aug 2021 11:45) (86/58 - 160/120)  ABP(mean): 63 (13 Aug 2021 11:45)  RR: 20 (13 Aug 2021 11:45) (8 - 24)  SpO2: 100% (13 Aug 2021 11:45) (95% - 100%)  CVP(mm Hg): 2 (13 Aug 2021 09:00)  CO: 6.2 (13 Aug 2021 09:00)  CI: 3.5 (13 Aug 2021 09:00)  PA: 25/8 (13 Aug 2021 09:00)  SVR: 863 (13 Aug 2021 09:00)  Mode: PS (Pressure Support)/ Spontaneous  FiO2: 40  PEEP: 5  PS: 6    I&O's Detail    12 Aug 2021 07:01  -  13 Aug 2021 07:00  --------------------------------------------------------  IN:    Albumin 5%  - 500 mL: 500 mL    Dexmedetomidine: 42.6 mL    Insulin: 21 mL    IV PiggyBack: 400 mL    Norepinephrine: 33 mL    Oral Fluid: 240 mL    sodium chloride 0.9%: 360 mL    Vasopressin: 34.8 mL  Total IN: 1631.4 mL    OUT:    Chest Tube (mL): 300 mL    Chest Tube (mL): 140 mL    Chest Tube (mL): 190 mL    Indwelling Catheter - Urethral (mL): 1920 mL    Nitroglycerin: 0 mL    Propofol: 0 mL  Total OUT: 2550 mL        Net: I&O's Detail    11 Aug 2021 07:01  -  12 Aug 2021 07:00  --------------------------------------------------------  Total NET: -400 mL      12 Aug 2021 07:01  -  13 Aug 2021 07:00  --------------------------------------------------------  Total NET: -918.6 mL        CAPILLARY BLOOD GLUCOSE      POCT Blood Glucose.: 148 mg/dL (13 Aug 2021 10:04)  POCT Blood Glucose.: 133 mg/dL (13 Aug 2021 08:27)  POCT Blood Glucose.: 127 mg/dL (13 Aug 2021 06:03)  POCT Blood Glucose.: 108 mg/dL (13 Aug 2021 04:00)  POCT Blood Glucose.: 120 mg/dL (13 Aug 2021 02:20)  POCT Blood Glucose.: 141 mg/dL (13 Aug 2021 01:05)  POCT Blood Glucose.: 119 mg/dL (12 Aug 2021 23:19)  POCT Blood Glucose.: 138 mg/dL (12 Aug 2021 22:39)  POCT Blood Glucose.: 94 mg/dL (12 Aug 2021 21:21)  POCT Blood Glucose.: 104 mg/dL (12 Aug 2021 20:10)  POCT Blood Glucose.: 108 mg/dL (12 Aug 2021 18:45)  POCT Blood Glucose.: 125 mg/dL (12 Aug 2021 17:49)  POCT Blood Glucose.: 162 mg/dL (12 Aug 2021 16:08)  POCT Blood Glucose.: 143 mg/dL (12 Aug 2021 14:24)    A1C with Estimated Average Glucose Result: 5.6 % (08-10-21 @ 07:56)      Physical Exam:  General: NAD; A&Ox3  Cardiac: S1/S2, RRR, no murmur, no rubs  Lungs: unlabored respirations, CTA b/l, no wheeze, no rales, no crackles  Abdomen: Soft/NT/ND; positive bowel sounds x 4  Sternum: Intact, no click, incision healing well with no drainage  Incisions: Incisions clean/dry/intact  Extremities: No edema b/l lower extremities; good capillary refill; no cyanosis; palpable 1+ pedal pulses b/l    Central Venous Catheter: Yes[]  No[X] , If Yes indication: intravenous access       Day #  Vazquez Catheter: Yes  [X] , No  [] , If yes indication: monitor strict i/o's                     Day #  EPICARDIAL WIRES:  [X] YES [] NO                                                            Day #  BOWEL MOVEMENT:  [] YES [X] NO, If No, Timing since last BM Day #  CHEST TUBE(MS/Left/Right):  [X] YES [] NO, If yes -  AIR LEAKS:  [] YES [] NO        LABS:                        10.0<L>  11.82<H> )-----------( 115<L>    ( 13 Aug 2021 01:50 )             28.5<L>                        10.7<L>  16.54<H> )-----------( 106<L>    ( 12 Aug 2021 14:12 )             29.8<L>    08-13    135  |  103  |  10  ----------------------------<  137<H>  4.5   |  20  |  0.7  -12    138  |  105  |  11  ----------------------------<  143<H>  4.0   |  20  |  0.8    Ca    7.9<L>      13 Aug 2021 01:50  Mg     1.9     08-13    TPro  5.6<L> [6.0 - 8.0]  /  Alb  4.5 [3.5 - 5.2]  /  TBili  1.4<H> [0.2 - 1.2]  /  DBili  x   /  AST  31 [0 - 41]  /  ALT  26 [0 - 41]  /  AlkPhos  33 [30 - 115]  08-13    PT/INR - ( 13 Aug 2021 01:50 )   PT: ;   INR: 1.49 ratio         PT/INR - ( 12 Aug 2021 14:12 )   PT: ;   INR: 1.66 ratio         PTT - ( 13 Aug 2021 01:50 )  PTT:26.7 sec, PTT - ( 12 Aug 2021 14:12 )  PTT:26.6 sec  Urinalysis Basic - ( 10 Aug 2021 17:29 )    Color: Light Yellow / Appearance: Clear / S.010 / pH: x  Gluc: x / Ketone: Negative  / Bili: Negative / Urobili: <2 mg/dL   Blood: x / Protein: Negative / Nitrite: Negative   Leuk Esterase: Negative / RBC: x / WBC x   Sq Epi: x / Non Sq Epi: x / Bacteria: x      ABG - ( 13 Aug 2021 03:48 )  pH: 7.38  /  pCO2: 40    /  pO2: 102   / HCO3: 24    / Base Excess: -1.0  /  SaO2: 98    /  LA: 0.9        RADIOLOGY & ADDITIONAL TESTS:  CXR: < from: Xray Chest 1 View- PORTABLE-Routine (21 @ 07:18) >  Impression:  Pulmonary vascular congestion. Left basilar opacity, possibly atelectasis. Attention on follow-up.    EKG: < from: 12 Lead ECG (21 @ 07:24) >    Ventricular Rate 68 BPM    Atrial Rate 68 BPM    P-R Interval 146 ms    QRS Duration 90 ms    Q-T Interval 416 ms    QTC Calculation(Bazett) 442 ms    P Axis 64 degrees    R Axis -4 degrees    T Axis 4 degrees    Diagnosis Line Normal sinus rhythm  Inferior infarct , age undetermined  Abnormal ECG    Allergies: No Known Allergies      MEDICATIONS  (STANDING):  ALBUTerol    90 MICROgram(s) HFA Inhaler 2 Puff(s) Inhalation every 6 hours  aspirin enteric coated 325 milliGRAM(s) Oral daily  atorvastatin Oral Tab/Cap - Peds 40 milliGRAM(s) Oral at bedtime  chlorhexidine 0.12% Liquid 5 milliLiter(s) Oral Mucosa two times a day  dexMEDEtomidine Infusion 0.1 MICROgram(s)/kG/Hr (1.78 mL/Hr) IV Continuous <Continuous>  dextrose 50% Injectable 50 milliLiter(s) IV Push every 15 minutes  dextrose 50% Injectable 25 milliLiter(s) IV Push every 15 minutes  dronedarone 400 milliGRAM(s) Oral two times a day  enoxaparin Injectable 40 milliGRAM(s) SubCutaneous daily  insulin regular Infusion 10 Unit(s)/Hr (10 mL/Hr) IV Continuous <Continuous>  ipratropium 17 MICROgram(s) HFA Inhaler 2 Puff(s) Inhalation every 6 hours  meperidine     Injectable 25 milliGRAM(s) IV Push once  metoprolol tartrate 12.5 milliGRAM(s) Oral every 12 hours  nitroglycerin  Infusion 30 MICROgram(s)/Min (9 mL/Hr) IV Continuous <Continuous>  norepinephrine Infusion 0.05 MICROgram(s)/kG/Min (6.68 mL/Hr) IV Continuous <Continuous>  pantoprazole    Tablet 40 milliGRAM(s) Oral before breakfast  polyethylene glycol 3350 17 Gram(s) Oral daily  propofol Infusion 20 MICROgram(s)/kG/Min (8.54 mL/Hr) IV Continuous <Continuous>  sodium chloride 0.9%. 1000 milliLiter(s) (50 mL/Hr) IV Continuous <Continuous>  sodium chloride 0.9%. 1000 milliLiter(s) (20 mL/Hr) IV Continuous <Continuous>  vasopressin Infusion 0.04 Unit(s)/Min (2.4 mL/Hr) IV Continuous <Continuous>    MEDICATIONS  (PRN):  ketorolac   Injectable 15 milliGRAM(s) IV Push every 4 hours PRN Moderate Pain (4 - 6)  oxyCODONE    IR 10 milliGRAM(s) Oral every 4 hours PRN Severe Pain (7 - 10)  oxyCODONE    IR 5 milliGRAM(s) Oral every 6 hours PRN Moderate Pain (4 - 6)    Home Medications:  amLODIPine 10 mg oral tablet: 1 tab(s) orally once a day (08 Aug 2021 17:45)  atenolol 50 mg oral tablet: 1 tab(s) orally once a day (08 Aug 2021 17:45)  Eliquis 5 mg oral tablet: 1 tab(s) orally 2 times a day (08 Aug 2021 17:45)  Lipitor 40 mg oral tablet: 1 tab(s) orally once a day (08 Aug 2021 17:45)  Multaq 400 mg oral tablet: 1 tab(s) orally 2 times a day (08 Aug 2021 17:44)    Pharmacologic DVT Prophylaxis [X] YES, [] NO: Contraindication:  SCD's: YES b/l    GI Prophylaxis: pantoprazole 40mg    Post-Op Beta-Blockers: [X] Yes, [] No: contraindication:  Post-Op CCB: [] Yes, [X] No: contraindication: Not indicated   Post-Op Aspirin:  [X] Yes, [] No: contraindication:  Post-Op Statin:  [X] Yes, [] No: contraindication:    Ambulation/Activity Status:    Assessment/Plan:  81y Male status-post  - Case and plan discussed with CTU Intensivist and CT Surgeon - Dr. Valero/Luis/Marie  - Continue CTU supportive care and ongoing plan of care as per continuing CTU rounds.   - Continue DVT/GI prophylaxis  - Incentive Spirometry 10 times an hour  - Continue to advance physical activity as tolerated and continue PT/OT as directed  1. CAD: Continue ASA, statin, BB, Pain control as needed   2. HTN:   3. A-fib ppx: Magnesium Sulfate IV   4. COPD/Hypoxia: Ipratropium   5. DM/Glucose Control: continue insulin regular infusion     Social Service Disposition:      OPERATIVE PROCEDURE(s): s/p GABGx4 w/ATILIO clip               POD # 1                      SURGEON(s): ABHIJIT Smith  SUBJECTIVE ASSESSMENT: 81y Male patient seen and examined at bedside. Pt appears comfortable, denies any abnormal complaints.     Vital Signs Last 24 Hrs  T(F): 99.5 (13 Aug 2021 09:00), Max: 100.2 (12 Aug 2021 19:00)  HR: 90 (13 Aug 2021 11:45) (89 - 90)  BP: 104/57 (13 Aug 2021 10:00) (89/55 - 115/67)  BP(mean): 73 (13 Aug 2021 10:00) (66 - 85)  ABP: 127/42 (13 Aug 2021 11:45) (86/58 - 160/120)  ABP(mean): 63 (13 Aug 2021 11:45)  RR: 20 (13 Aug 2021 11:45) (8 - 24)  SpO2: 100% (13 Aug 2021 11:45) (95% - 100%) 2 L NC  CVP(mm Hg): 2 (13 Aug 2021 09:00)  CO: 6.2 (13 Aug 2021 09:00)  CI: 3.5 (13 Aug 2021 09:00)  PA: 25/8 (13 Aug 2021 09:00)  SVR: 863 (13 Aug 2021 09:00)    I&O's Detail    12 Aug 2021 07:01  -  13 Aug 2021 07:00  --------------------------------------------------------  IN:    Albumin 5%  - 500 mL: 500 mL    Dexmedetomidine: 42.6 mL    Insulin: 21 mL    IV PiggyBack: 400 mL    Norepinephrine: 33 mL    Oral Fluid: 240 mL    sodium chloride 0.9%: 360 mL    Vasopressin: 34.8 mL  Total IN: 1631.4 mL    OUT:    Chest Tube (mL): 300 mL    Chest Tube (mL): 140 mL    Chest Tube (mL): 190 mL    Indwelling Catheter - Urethral (mL): 1920 mL    Nitroglycerin: 0 mL    Propofol: 0 mL  Total OUT: 2550 mL    Net: I&O's Detail    11 Aug 2021 07:01  -  12 Aug 2021 07:00  --------------------------------------------------------  Total NET: -400 mL    12 Aug 2021 07:01  -  13 Aug 2021 07:00  --------------------------------------------------------  Total NET: -918.6 mL      CAPILLARY BLOOD GLUCOSE  POCT Blood Glucose.: 148 mg/dL (13 Aug 2021 10:04)  POCT Blood Glucose.: 133 mg/dL (13 Aug 2021 08:27)  POCT Blood Glucose.: 127 mg/dL (13 Aug 2021 06:03)  POCT Blood Glucose.: 108 mg/dL (13 Aug 2021 04:00)  POCT Blood Glucose.: 120 mg/dL (13 Aug 2021 02:20)  POCT Blood Glucose.: 141 mg/dL (13 Aug 2021 01:05)  POCT Blood Glucose.: 119 mg/dL (12 Aug 2021 23:19)  POCT Blood Glucose.: 138 mg/dL (12 Aug 2021 22:39)  POCT Blood Glucose.: 94 mg/dL (12 Aug 2021 21:21)  POCT Blood Glucose.: 104 mg/dL (12 Aug 2021 20:10)  POCT Blood Glucose.: 108 mg/dL (12 Aug 2021 18:45)  POCT Blood Glucose.: 125 mg/dL (12 Aug 2021 17:49)  POCT Blood Glucose.: 162 mg/dL (12 Aug 2021 16:08)  POCT Blood Glucose.: 143 mg/dL (12 Aug 2021 14:24)    A1C with Estimated Average Glucose Result: 5.6 % (08-10-21 @ 07:56)      Physical Exam:  General: NAD; A&Ox3  Cardiac: S1/S2, RRR, no murmur, no rubs  Lungs: unlabored respirations, CTA b/l, no wheeze, no rales, no crackles  Abdomen: Soft/NT/ND; positive bowel sounds x 4  Sternum: Intact, no click, incision healing well with no drainage  Incisions: Incisions clean/dry/intact  Extremities: No edema b/l lower extremities; good capillary refill; no cyanosis; palpable 1+ pedal pulses b/l    Central Venous Catheter: Yes[x]  No[] , If Yes indication: intravenous access       Day # 1  Vazquez Catheter: Yes  [X] , No  [] , If yes indication: monitor strict i/o's                     Day # 1   EPICARDIAL WIRES:  [X] YES [] NO                                                            Day # 1  BOWEL MOVEMENT:  [] YES [X] NO, If No, Timing since last BM Day #  CHEST TUBE(MS/Left/Right):  [X] YES [] NO, If yes -  AIR LEAKS:  [] YES [x] NO        LABS:                        10.0<L>  11.82<H> )-----------( 115<L>    ( 13 Aug 2021 01:50 )             28.5<L>                        10.7<L>  16.54<H> )-----------( 106<L>    ( 12 Aug 2021 14:12 )             29.8<L>    08    135  |  103  |  10  ----------------------------<  137<H>  4.5   |  20  |  0.7      138  |  105  |  11  ----------------------------<  143<H>  4.0   |  20  |  0.8    Ca    7.9<L>      13 Aug 2021 01:50  Mg     1.9         TPro  5.6<L> [6.0 - 8.0]  /  Alb  4.5 [3.5 - 5.2]  /  TBili  1.4<H> [0.2 - 1.2]  /  DBili  x   /  AST  31 [0 - 41]  /  ALT  26 [0 - 41]  /  AlkPhos  33 [30 - 115]  08-13    PT/INR - ( 13 Aug 2021 01:50 )   PT: ;   INR: 1.49 ratio       PT/INR - ( 12 Aug 2021 14:12 )   PT: ;   INR: 1.66 ratio       PTT - ( 13 Aug 2021 01:50 )  PTT:26.7 sec, PTT - ( 12 Aug 2021 14:12 )  PTT:26.6 sec    Urinalysis Basic - ( 10 Aug 2021 17:29 )  Color: Light Yellow / Appearance: Clear / S.010 / pH: x  Gluc: x / Ketone: Negative  / Bili: Negative / Urobili: <2 mg/dL   Blood: x / Protein: Negative / Nitrite: Negative   Leuk Esterase: Negative / RBC: x / WBC x   Sq Epi: x / Non Sq Epi: x / Bacteria: x      ABG - ( 13 Aug 2021 03:48 )  pH: 7.38  /  pCO2: 40    /  pO2: 102   / HCO3: 24    / Base Excess: -1.0  /  SaO2: 98    /  LA: 0.9        RADIOLOGY & ADDITIONAL TESTS:  CXR: < from: Xray Chest 1 View- PORTABLE-Routine (21 @ 07:18) >  Impression:  Pulmonary vascular congestion. Left basilar opacity, possibly atelectasis. Attention on follow-up.    EKG: < from: 12 Lead ECG (21 @ 07:24) >    Ventricular Rate 68 BPM    Atrial Rate 68 BPM    P-R Interval 146 ms    QRS Duration 90 ms    Q-T Interval 416 ms    QTC Calculation(Bazett) 442 ms    P Axis 64 degrees    R Axis -4 degrees    T Axis 4 degrees    Diagnosis Line Normal sinus rhythm  Inferior infarct , age undetermined  Abnormal ECG    Allergies: No Known Allergies      MEDICATIONS  (STANDING):  ALBUTerol    90 MICROgram(s) HFA Inhaler 2 Puff(s) Inhalation every 6 hours  aspirin enteric coated 325 milliGRAM(s) Oral daily  atorvastatin Oral Tab/Cap - Peds 40 milliGRAM(s) Oral at bedtime  chlorhexidine 0.12% Liquid 5 milliLiter(s) Oral Mucosa two times a day  dexMEDEtomidine Infusion 0.1 MICROgram(s)/kG/Hr (1.78 mL/Hr) IV Continuous <Continuous>  dextrose 50% Injectable 50 milliLiter(s) IV Push every 15 minutes  dextrose 50% Injectable 25 milliLiter(s) IV Push every 15 minutes  dronedarone 400 milliGRAM(s) Oral two times a day  enoxaparin Injectable 40 milliGRAM(s) SubCutaneous daily  insulin regular Infusion 10 Unit(s)/Hr (10 mL/Hr) IV Continuous <Continuous>  ipratropium 17 MICROgram(s) HFA Inhaler 2 Puff(s) Inhalation every 6 hours  meperidine     Injectable 25 milliGRAM(s) IV Push once  metoprolol tartrate 12.5 milliGRAM(s) Oral every 12 hours  nitroglycerin  Infusion 30 MICROgram(s)/Min (9 mL/Hr) IV Continuous <Continuous>  norepinephrine Infusion 0.05 MICROgram(s)/kG/Min (6.68 mL/Hr) IV Continuous <Continuous>  pantoprazole    Tablet 40 milliGRAM(s) Oral before breakfast  polyethylene glycol 3350 17 Gram(s) Oral daily  propofol Infusion 20 MICROgram(s)/kG/Min (8.54 mL/Hr) IV Continuous <Continuous>  sodium chloride 0.9%. 1000 milliLiter(s) (50 mL/Hr) IV Continuous <Continuous>  sodium chloride 0.9%. 1000 milliLiter(s) (20 mL/Hr) IV Continuous <Continuous>  vasopressin Infusion 0.04 Unit(s)/Min (2.4 mL/Hr) IV Continuous <Continuous>    MEDICATIONS  (PRN):  ketorolac   Injectable 15 milliGRAM(s) IV Push every 4 hours PRN Moderate Pain (4 - 6)  oxyCODONE    IR 10 milliGRAM(s) Oral every 4 hours PRN Severe Pain (7 - 10)  oxyCODONE    IR 5 milliGRAM(s) Oral every 6 hours PRN Moderate Pain (4 - 6)    Home Medications:  amLODIPine 10 mg oral tablet: 1 tab(s) orally once a day (08 Aug 2021 17:45)  atenolol 50 mg oral tablet: 1 tab(s) orally once a day (08 Aug 2021 17:45)  Eliquis 5 mg oral tablet: 1 tab(s) orally 2 times a day (08 Aug 2021 17:45)  Lipitor 40 mg oral tablet: 1 tab(s) orally once a day (08 Aug 2021 17:45)  Multaq 400 mg oral tablet: 1 tab(s) orally 2 times a day (08 Aug 2021 17:44)    Pharmacologic DVT Prophylaxis [X] YES, [] NO: Contraindication:  SCD's: YES b/l    GI Prophylaxis: pantoprazole 40mg    Post-Op Beta-Blockers: [X] Yes, [] No: contraindication:  Post-Op Aspirin:  [X] Yes, [] No: contraindication:  Post-Op Statin:  [X] Yes, [] No: contraindication:    Ambulation/Activity Status: OOB/AMB    Assessment/Plan:  81y Male status-post  GABGx4 w/ATILIO clip               POD # 1  - Case and plan discussed with CTU Intensivist and CT Surgeon - Dr. Smith  - Continue CTU supportive care and ongoing plan of care as per continuing CTU rounds.   - Continue DVT/GI prophylaxis  - Incentive Spirometry 10 times an hour  - Continue to advance physical activity as tolerated and continue PT/OT as directed  1. CAD s/p CABG: Continue ASA, statin, BB, Pain control as needed   2. Pre-op A-fib: Cont dronedarone 400 milliGRAM(s) Oral two times a day, on eliquis at home held since  for planned cabg.   3. COPD/Hypoxia: Cont Ipratropium, wean supplemental O2 as tolerated and encourage incentive spirometer.   4. DM/Glucose Control: A1c 5.6%, fs well controlled; d/c insulin infusion and cont RISS.

## 2021-08-13 NOTE — PHYSICAL THERAPY INITIAL EVALUATION ADULT - TRANSFER SAFETY CONCERNS NOTED: SIT/STAND, REHAB EVAL
losing balance/decreased sequencing ability/stepping too close to front of assistive device/decreased weight-shifting ability

## 2021-08-13 NOTE — PHYSICAL THERAPY INITIAL EVALUATION ADULT - GAIT DEVIATIONS NOTED, PT EVAL
slow guarded gait/increased time in double stance/decreased step length/decreased stride length/increased stride width

## 2021-08-14 LAB
ALBUMIN SERPL ELPH-MCNC: 3.8 G/DL — SIGNIFICANT CHANGE UP (ref 3.5–5.2)
ALP SERPL-CCNC: 40 U/L — SIGNIFICANT CHANGE UP (ref 30–115)
ALT FLD-CCNC: 21 U/L — SIGNIFICANT CHANGE UP (ref 0–41)
ANION GAP SERPL CALC-SCNC: 11 MMOL/L — SIGNIFICANT CHANGE UP (ref 7–14)
APTT BLD: 29.3 SEC — SIGNIFICANT CHANGE UP (ref 27–39.2)
AST SERPL-CCNC: 29 U/L — SIGNIFICANT CHANGE UP (ref 0–41)
BASOPHILS # BLD AUTO: 0.01 K/UL — SIGNIFICANT CHANGE UP (ref 0–0.2)
BASOPHILS NFR BLD AUTO: 0.1 % — SIGNIFICANT CHANGE UP (ref 0–1)
BILIRUB SERPL-MCNC: 0.6 MG/DL — SIGNIFICANT CHANGE UP (ref 0.2–1.2)
BUN SERPL-MCNC: 19 MG/DL — SIGNIFICANT CHANGE UP (ref 10–20)
CALCIUM SERPL-MCNC: 7.9 MG/DL — LOW (ref 8.5–10.1)
CHLORIDE SERPL-SCNC: 101 MMOL/L — SIGNIFICANT CHANGE UP (ref 98–110)
CO2 SERPL-SCNC: 21 MMOL/L — SIGNIFICANT CHANGE UP (ref 17–32)
CREAT SERPL-MCNC: 1 MG/DL — SIGNIFICANT CHANGE UP (ref 0.7–1.5)
EOSINOPHIL # BLD AUTO: 0.03 K/UL — SIGNIFICANT CHANGE UP (ref 0–0.7)
EOSINOPHIL NFR BLD AUTO: 0.3 % — SIGNIFICANT CHANGE UP (ref 0–8)
GLUCOSE BLDC GLUCOMTR-MCNC: 156 MG/DL — HIGH (ref 70–99)
GLUCOSE BLDC GLUCOMTR-MCNC: 159 MG/DL — HIGH (ref 70–99)
GLUCOSE BLDC GLUCOMTR-MCNC: 235 MG/DL — HIGH (ref 70–99)
GLUCOSE BLDC GLUCOMTR-MCNC: 248 MG/DL — HIGH (ref 70–99)
GLUCOSE SERPL-MCNC: 187 MG/DL — HIGH (ref 70–99)
HCT VFR BLD CALC: 28.2 % — LOW (ref 42–52)
HGB BLD-MCNC: 9.8 G/DL — LOW (ref 14–18)
IMM GRANULOCYTES NFR BLD AUTO: 0.3 % — SIGNIFICANT CHANGE UP (ref 0.1–0.3)
INR BLD: 1.57 RATIO — HIGH (ref 0.65–1.3)
LYMPHOCYTES # BLD AUTO: 0.98 K/UL — LOW (ref 1.2–3.4)
LYMPHOCYTES # BLD AUTO: 10.1 % — LOW (ref 20.5–51.1)
MAGNESIUM SERPL-MCNC: 2.6 MG/DL — HIGH (ref 1.8–2.4)
MCHC RBC-ENTMCNC: 31.7 PG — HIGH (ref 27–31)
MCHC RBC-ENTMCNC: 34.8 G/DL — SIGNIFICANT CHANGE UP (ref 32–37)
MCV RBC AUTO: 91.3 FL — SIGNIFICANT CHANGE UP (ref 80–94)
MONOCYTES # BLD AUTO: 0.96 K/UL — HIGH (ref 0.1–0.6)
MONOCYTES NFR BLD AUTO: 9.8 % — HIGH (ref 1.7–9.3)
NEUTROPHILS # BLD AUTO: 7.74 K/UL — HIGH (ref 1.4–6.5)
NEUTROPHILS NFR BLD AUTO: 79.4 % — HIGH (ref 42.2–75.2)
NRBC # BLD: 0 /100 WBCS — SIGNIFICANT CHANGE UP (ref 0–0)
PLATELET # BLD AUTO: 106 K/UL — LOW (ref 130–400)
POTASSIUM SERPL-MCNC: 4.5 MMOL/L — SIGNIFICANT CHANGE UP (ref 3.5–5)
POTASSIUM SERPL-SCNC: 4.5 MMOL/L — SIGNIFICANT CHANGE UP (ref 3.5–5)
PROT SERPL-MCNC: 5.2 G/DL — LOW (ref 6–8)
PROTHROM AB SERPL-ACNC: 18 SEC — HIGH (ref 9.95–12.87)
RBC # BLD: 3.09 M/UL — LOW (ref 4.7–6.1)
RBC # FLD: 11.8 % — SIGNIFICANT CHANGE UP (ref 11.5–14.5)
SODIUM SERPL-SCNC: 133 MMOL/L — LOW (ref 135–146)
WBC # BLD: 9.75 K/UL — SIGNIFICANT CHANGE UP (ref 4.8–10.8)
WBC # FLD AUTO: 9.75 K/UL — SIGNIFICANT CHANGE UP (ref 4.8–10.8)

## 2021-08-14 PROCEDURE — 99233 SBSQ HOSP IP/OBS HIGH 50: CPT

## 2021-08-14 PROCEDURE — 93010 ELECTROCARDIOGRAM REPORT: CPT

## 2021-08-14 PROCEDURE — 71045 X-RAY EXAM CHEST 1 VIEW: CPT | Mod: 26

## 2021-08-14 RX ORDER — METOPROLOL TARTRATE 50 MG
12.5 TABLET ORAL EVERY 6 HOURS
Refills: 0 | Status: DISCONTINUED | OUTPATIENT
Start: 2021-08-14 | End: 2021-08-16

## 2021-08-14 RX ORDER — AMIODARONE HYDROCHLORIDE 400 MG/1
0.5 TABLET ORAL
Qty: 900 | Refills: 0 | Status: DISCONTINUED | OUTPATIENT
Start: 2021-08-14 | End: 2021-08-15

## 2021-08-14 RX ORDER — FUROSEMIDE 40 MG
20 TABLET ORAL ONCE
Refills: 0 | Status: COMPLETED | OUTPATIENT
Start: 2021-08-14 | End: 2021-08-14

## 2021-08-14 RX ORDER — COLCHICINE 0.6 MG
0.6 TABLET ORAL EVERY 12 HOURS
Refills: 0 | Status: DISCONTINUED | OUTPATIENT
Start: 2021-08-14 | End: 2021-08-15

## 2021-08-14 RX ORDER — IBUPROFEN 200 MG
400 TABLET ORAL ONCE
Refills: 0 | Status: COMPLETED | OUTPATIENT
Start: 2021-08-14 | End: 2021-08-14

## 2021-08-14 RX ORDER — TAMSULOSIN HYDROCHLORIDE 0.4 MG/1
0.4 CAPSULE ORAL AT BEDTIME
Refills: 0 | Status: DISCONTINUED | OUTPATIENT
Start: 2021-08-14 | End: 2021-08-19

## 2021-08-14 RX ORDER — IBUPROFEN 200 MG
400 TABLET ORAL EVERY 8 HOURS
Refills: 0 | Status: COMPLETED | OUTPATIENT
Start: 2021-08-14 | End: 2021-08-17

## 2021-08-14 RX ORDER — SENNA PLUS 8.6 MG/1
2 TABLET ORAL AT BEDTIME
Refills: 0 | Status: DISCONTINUED | OUTPATIENT
Start: 2021-08-14 | End: 2021-08-16

## 2021-08-14 RX ORDER — PROCAINAMIDE HCL 500 MG
1000 TABLET, EXTENDED RELEASE ORAL ONCE
Refills: 0 | Status: COMPLETED | OUTPATIENT
Start: 2021-08-14 | End: 2021-08-14

## 2021-08-14 RX ORDER — APIXABAN 2.5 MG/1
5 TABLET, FILM COATED ORAL EVERY 12 HOURS
Refills: 0 | Status: DISCONTINUED | OUTPATIENT
Start: 2021-08-14 | End: 2021-08-19

## 2021-08-14 RX ORDER — ASPIRIN/CALCIUM CARB/MAGNESIUM 324 MG
81 TABLET ORAL DAILY
Refills: 0 | Status: DISCONTINUED | OUTPATIENT
Start: 2021-08-14 | End: 2021-08-19

## 2021-08-14 RX ADMIN — Medication 400 MILLIGRAM(S): at 22:14

## 2021-08-14 RX ADMIN — Medication 400 MILLIGRAM(S): at 22:44

## 2021-08-14 RX ADMIN — Medication 0.6 MILLIGRAM(S): at 05:46

## 2021-08-14 RX ADMIN — APIXABAN 5 MILLIGRAM(S): 2.5 TABLET, FILM COATED ORAL at 15:43

## 2021-08-14 RX ADMIN — Medication 100 GRAM(S): at 18:17

## 2021-08-14 RX ADMIN — Medication 4: at 11:55

## 2021-08-14 RX ADMIN — Medication 20 MILLIGRAM(S): at 11:53

## 2021-08-14 RX ADMIN — Medication 400 MILLIGRAM(S): at 11:54

## 2021-08-14 RX ADMIN — Medication 325 MILLIGRAM(S): at 11:57

## 2021-08-14 RX ADMIN — POLYETHYLENE GLYCOL 3350 17 GRAM(S): 17 POWDER, FOR SOLUTION ORAL at 11:55

## 2021-08-14 RX ADMIN — Medication 520 MILLIGRAM(S): at 05:32

## 2021-08-14 RX ADMIN — Medication 2: at 16:23

## 2021-08-14 RX ADMIN — Medication 2: at 07:46

## 2021-08-14 RX ADMIN — CHLORHEXIDINE GLUCONATE 5 MILLILITER(S): 213 SOLUTION TOPICAL at 05:23

## 2021-08-14 RX ADMIN — Medication 400 MILLIGRAM(S): at 05:32

## 2021-08-14 RX ADMIN — PANTOPRAZOLE SODIUM 40 MILLIGRAM(S): 20 TABLET, DELAYED RELEASE ORAL at 07:51

## 2021-08-14 RX ADMIN — Medication 100 GRAM(S): at 05:25

## 2021-08-14 RX ADMIN — Medication 400 MILLIGRAM(S): at 06:29

## 2021-08-14 RX ADMIN — Medication 12.5 MILLIGRAM(S): at 05:23

## 2021-08-14 RX ADMIN — Medication 81 MILLIGRAM(S): at 22:14

## 2021-08-14 RX ADMIN — Medication 12.5 MILLIGRAM(S): at 22:14

## 2021-08-14 RX ADMIN — ATORVASTATIN CALCIUM 40 MILLIGRAM(S): 80 TABLET, FILM COATED ORAL at 22:13

## 2021-08-14 RX ADMIN — TAMSULOSIN HYDROCHLORIDE 0.4 MILLIGRAM(S): 0.4 CAPSULE ORAL at 15:43

## 2021-08-14 RX ADMIN — Medication 12.5 MILLIGRAM(S): at 11:57

## 2021-08-14 RX ADMIN — AMIODARONE HYDROCHLORIDE 16.7 MG/MIN: 400 TABLET ORAL at 12:11

## 2021-08-14 RX ADMIN — SENNA PLUS 2 TABLET(S): 8.6 TABLET ORAL at 22:14

## 2021-08-14 RX ADMIN — OXYCODONE HYDROCHLORIDE 10 MILLIGRAM(S): 5 TABLET ORAL at 06:15

## 2021-08-14 RX ADMIN — Medication 0.6 MILLIGRAM(S): at 18:18

## 2021-08-14 RX ADMIN — OXYCODONE HYDROCHLORIDE 10 MILLIGRAM(S): 5 TABLET ORAL at 05:45

## 2021-08-14 NOTE — PROGRESS NOTE ADULT - SUBJECTIVE AND OBJECTIVE BOX
CTU Attending Progress Daily Note     14 Aug 2021 12:10  Admited 08-08-21, Hospital Day 6d  POD# - 2    HPI:  Patient is a 80 yo M hx of CAD s/p PCI x 3 last 2010, Afib on Eliquis and HLD presented with cc of chest pain. Chest pain started  last night around 11pm, described as heavy pressure, 6/10, with radiation to left arm associated with Palpitations. Patient follows with a cardiologist in [place] and was told if this occurs he should take an extra dose of his medications so around 2am he took an extra dose of Multaq (Extra 400mg) and atenolol(extra 50mg)  and took his morning dose. However, he came to the ED today because his is still having the CP and palpitations. He also noticed that he has CP with exertion which is new for him. He normally walks a lot but has needed to stop to rest while walking up the stairs due to CP. No LE swelling. No nausea, vomiting, abdominal pain, fevers or chills.    IN ED: Pt was hypotensive to 90's with improvement to 100's  initially 90s, bradycardic to  HR 50's   Labs: Trop: 0.04 x 2, EKG: Afib, no ST changes      (08 Aug 2021 17:18)    Home Medications:  amLODIPine 10 mg oral tablet: 1 tab(s) orally once a day (08 Aug 2021 17:45)  atenolol 50 mg oral tablet: 1 tab(s) orally once a day (08 Aug 2021 17:45)  Eliquis 5 mg oral tablet: 1 tab(s) orally 2 times a day (08 Aug 2021 17:45)  Lipitor 40 mg oral tablet: 1 tab(s) orally once a day (08 Aug 2021 17:45)  Multaq 400 mg oral tablet: 1 tab(s) orally 2 times a day (08 Aug 2021 17:44)    FAMILY HISTORY:    PAST MEDICAL & SURGICAL HISTORY:  CAD (coronary artery disease)    HLD (hyperlipidemia)    Hypertension      Interval event for past 24 hr:  RADHA BAKER  81y had no event.   Current Complains:  RADHA BAKER has no new complains  Allergies    No Known Allergies    Intolerances      OBJECTIVE:  Vitals last 24 hrs  T(C): 37.8 (08-14-21 @ 08:00), Max: 38.4 (08-14-21 @ 05:00)  T(F): 100 (08-14-21 @ 08:00), Max: 101.1 (08-14-21 @ 05:00)  HR: 104 (08-14-21 @ 11:00) (90 - 172)  BP: 115/65 (08-14-21 @ 09:00) (65/46 - 121/73)  ABP: 119/50 (08-14-21 @ 11:00) (70/54 - 154/65)  ABP(mean): 70 (08-14-21 @ 11:00) (48 - 90)  RR: 20 (08-14-21 @ 11:00) (4 - 39)  SpO2: 98% (08-14-21 @ 11:00) (92% - 98%)      08-13-21 @ 07:01  -  08-14-21 @ 07:00  --------------------------------------------------------  IN:    Amiodarone: 366.6 mL    Insulin: 14 mL    IV PiggyBack: 900 mL    Oral Fluid: 1120 mL    sodium chloride 0.9%: 140 mL  Total IN: 2540.6 mL    OUT:    Chest Tube (mL): 0 mL    Chest Tube (mL): 140 mL    Chest Tube (mL): 180 mL    Indwelling Catheter - Urethral (mL): 1130 mL  Total OUT: 1450 mL    Total NET: 1090.6 mL             CAPILLARY BLOOD GLUCOSE      POCT Blood Glucose.: 235 mg/dL (14 Aug 2021 11:33)  POCT Blood Glucose.: 159 mg/dL (14 Aug 2021 06:57)  POCT Blood Glucose.: 207 mg/dL (13 Aug 2021 22:12)  POCT Blood Glucose.: 189 mg/dL (13 Aug 2021 16:13)  POCT Blood Glucose.: 189 mg/dL (13 Aug 2021 12:48)    LABS:  ABG - ( 14 Aug 2021 04:46 )  pH, Arterial: 7.43  pH, Blood: x     /  pCO2: 35    /  pO2: 74    / HCO3: 23    / Base Excess: -1.0  /  SaO2: 96              Blood Gas Arterial, Lactate: 1.0 mmoL/L (08-14-21 @ 04:46)                          9.8    9.75  )-----------( 106      ( 14 Aug 2021 01:09 )             28.2     Hemoglobin: 9.8 g/dL (08-14 @ 01:09)  Hemoglobin: 10.0 g/dL (08-13 @ 01:50)  Hemoglobin: 10.7 g/dL (08-12 @ 14:12)  Hemoglobin: 15.3 g/dL (08-12 @ 00:12)    08-14    133<L>  |  101  |  19  ----------------------------<  187<H>  4.5   |  21  |  1.0    Ca    7.9<L>      14 Aug 2021 01:09  Mg     2.6     08-14    TPro  5.2<L>  /  Alb  3.8  /  TBili  0.6  /  DBili  x   /  AST  29  /  ALT  21  /  AlkPhos  40  08-14    Creatinine, Serum: 1.0 mg/dL (08-14 @ 01:09)  Creatinine, Serum: 0.7 mg/dL (08-13 @ 01:50)  Creatinine, Serum: 0.8 mg/dL (08-12 @ 14:12)  Creatinine, Serum: 0.9 mg/dL (08-12 @ 00:12)  Creatinine, Serum: 0.8 mg/dL (08-11 @ 06:16)    PT/INR - ( 14 Aug 2021 01:28 )   PT: 18.00 sec;   INR: 1.57 ratio         PTT - ( 14 Aug 2021 01:28 )  PTT:29.3 sec      HOSPITAL MEDICATIONS:  MEDICATIONS  (STANDING):  acetaminophen  IVPB .. 1000 milliGRAM(s) IV Intermittent once  aMIOdarone Infusion 0.5 mG/Min (16.7 mL/Hr) IV Continuous <Continuous>  aspirin enteric coated 325 milliGRAM(s) Oral daily  atorvastatin Oral Tab/Cap - Peds 40 milliGRAM(s) Oral at bedtime  colchicine 0.6 milliGRAM(s) Oral every 12 hours  dextrose 40% Gel 15 Gram(s) Oral once  dextrose 5%. 1000 milliLiter(s) (50 mL/Hr) IV Continuous <Continuous>  dextrose 5%. 1000 milliLiter(s) (100 mL/Hr) IV Continuous <Continuous>  dextrose 50% Injectable 50 milliLiter(s) IV Push every 15 minutes  dextrose 50% Injectable 25 milliLiter(s) IV Push every 15 minutes  enoxaparin Injectable 40 milliGRAM(s) SubCutaneous daily  glucagon  Injectable 1 milliGRAM(s) IntraMuscular once  ibuprofen  Tablet. 400 milliGRAM(s) Oral every 8 hours  insulin lispro (ADMELOG) corrective regimen sliding scale   SubCutaneous three times a day before meals  magnesium sulfate  IVPB 1 Gram(s) IV Intermittent every 12 hours  meperidine     Injectable 25 milliGRAM(s) IV Push once  metoprolol tartrate 12.5 milliGRAM(s) Oral every 6 hours  pantoprazole    Tablet 40 milliGRAM(s) Oral before breakfast  polyethylene glycol 3350 17 Gram(s) Oral daily  senna 2 Tablet(s) Oral at bedtime  sodium chloride 0.9%. 1000 milliLiter(s) (20 mL/Hr) IV Continuous <Continuous>  tamsulosin 0.4 milliGRAM(s) Oral at bedtime    MEDICATIONS  (PRN):  ketorolac   Injectable 15 milliGRAM(s) IV Push every 4 hours PRN Moderate Pain (4 - 6)  oxyCODONE    IR 10 milliGRAM(s) Oral every 4 hours PRN Severe Pain (7 - 10)  oxyCODONE    IR 5 milliGRAM(s) Oral every 6 hours PRN Moderate Pain (4 - 6)      REVIEW OF SYSTEMS:  CONSTITUTIONAL: [X] all negative; [ ] weakness, [ ] fevers, [ ] chills  EYES/ENT: [X] all negative; [ ] visual changes, [ ] vertigo, [ ] throat pain, [ ] eye pain  NECK: [X] all negative; [ ] pain, [ ] stiffness  RESPIRATORY: [ ] all negative, [x] cough, [ ] wheezing, [ ] hemoptysis, [ ] shortness of breath, [x] chest pain  CARDIOVASCULAR: [X] all negative; [ ] anginal chest pain, [ ] palpitations, [ ] orthopnea  GASTROINTESTINAL: [X] all negative; [ ]abdominal pain, [ ] nausea, [ ] vomiting, [ ] hematemesis, [ ] diarrhea, [ ] constipation, [ ] melena, [ ] hematochezia.  GENITOURINARY: [X] all negative; [ ] dysuria, [ ] frequency, [ ] hematuria  NEUROLOGICAL: [X] all negative; [ ] numbness, [ ] weakness, [ ] paresthesias  MUSCULOSKELETAL: [X] all negative, [ ] joint pain, [ ] joint swelling, [ ] joint redness, [ ] bone pain  SKIN: [X] all negative; [ ] itching, [ ] burning, [ ] rashes, [ ] lesions   All other review of systems is negative unless indicated above.    [  ] Unable to assess ROS because     PHYSICAL EXAM:          CONSTITUTIONAL: Well-developed; well-nourished; in no acute distress.   	SKIN: warm, dry, no rashes or lesions  	HEENT: Atraumatic. Normocephalic. PERRL. Moist membranes, no conjunctival injection, sclera clear  	NECK: Supple; non tender.  No JVD. No lymphadenopathy.  	CARD: Normal S1, S2. Rate and Rhythm are regular. No murmurs.  	RESP: Good air entry bilaterally, no wheezes, no rales no rhonchi.  	ABD: Soft, not tender, not distended, no CVA tenderness, no rebound no guarding, bowel sounds present  	EXT: Normal ROM.  No clubbing, no cyanosis, no pedal edema, no calf pain b/l, Peripheral pulses intact.  	LYMPH: No acute adenopathy.  	NEURO: Alert, awake, motor 5/5 R, 5/5 L, sensation intact bilat, CN 2-12 intact,          PSYCH: Cooperative, appropriate. Alert & oriented x 3    RADIOLOGY:  X Reviewed and interpreted by me  CxR from 08-14-21 shows mild congestion, no pneumothorax, no effusion, no cardiomegaly,   Intro and Atrial Clip in place     ECG:  X Reviewed and interpreted by sunshine RALPH Fib 94, QTc - 472, ST elevation

## 2021-08-14 NOTE — PROGRESS NOTE ADULT - ASSESSMENT
PROBLEMS:  I spent 45 minutes of critical care time examining patient, reviewing vitals, labs, medications, imaging and discussing with the team goals of care to prevent life-threatening in this patient who is at high risk for deterioration or death due to:      CAD - s/p CABG - Lopressor 12.5 q 6, , Lipitor 40  A. Fib - Amiodarone drop to 0.5,   Pericarditis - add Motrin 400 q 8  Add Flomax - and D/c Vazquez  Fluid overload - Lasix 20 IV x1  Post op Anemia -  observe  thrombocytopenia - observe      Case and plan discussed with CT Surgeons and CTU PAs.  Continue CTU supportive care and ongoing plan of care as per continuing CTU rounds.   Continue DVT/GI prophylaxis.  Incentive Spirometry 10 times an hour.  Continue to advance physical activity as tolerated and continue PT/OT as directed.    PLAN  Neuro: move all 4 extremities. no sensory or motor deficits  Pain management.   acetaminophen  IVPB .. 1000 milliGRAM(s) IV Intermittent once  ibuprofen  Tablet. 400 milliGRAM(s) Oral every 8 hours  ketorolac   Injectable 15 milliGRAM(s) IV Push every 4 hours PRN    Pulm: Wean off supplemental oxygen as able. Daily CXR. Encourage coughing, deep breathing and use of incentive spirometry.     Cardio: Monitor telemetry/alarms. Continue supportive care   aMIOdarone Infusion 0.5 mG/Min IV Continuous <Continuous>  metoprolol tartrate 12.5 milliGRAM(s) Oral every 6 hours  tamsulosin 0.4 milliGRAM(s) Oral at bedtime    GI: Continue stool softeners.    pantoprazole    Tablet 40 milliGRAM(s) Oral before breakfast  senna 2 Tablet(s) Oral at bedtime    Nutrition: Continue present diet  Endocrine and glucose control:   atorvastatin Oral Tab/Cap - Peds 40 milliGRAM(s) Oral at bedtime  colchicine 0.6 milliGRAM(s) Oral every 12 hours  dextrose 40% Gel 15 Gram(s) Oral once  glucagon  Injectable 1 milliGRAM(s) IntraMuscular once  insulin lispro (ADMELOG) corrective regimen sliding scale   SubCutaneous three times a day before meals    Renal: monitor urine output, supplement electrolytes as needed,     Vasc: Heparin SC and/or SCDs for DVT prophylaxis  enoxaparin Injectable 40 milliGRAM(s) SubCutaneous daily    ID: Stable, no fever , no chills. Off antibiotics.    Therapy: OOB/ambulate  Disposition: start planing discharge home or placement    Pertinent clinical, laboratory, radiographic, hemodynamic, echocardiographic, respiratory data, microbiologic data and chart were reviewed and analyzed frequently throughout the course of the day and night. GI and DVT prophylaxis, glycemic control, head of bed elevation and skin care issues were addressed.  Patient seen, examined and plan discussed with CT Surgery / CTICU team during rounds.    [ ] The patient remains in critical and unstable condition, and requires ICU care and monitoring  [ x] The patient is improving but requires continued monitoring and adjustment of therapy

## 2021-08-14 NOTE — PROGRESS NOTE ADULT - SUBJECTIVE AND OBJECTIVE BOX
OPERATIVE PROCEDURE(s):                POD #                       SURGEON(s): ABHIJIT Smith  SUBJECTIVE ASSESSMENT:81yMale patient seen and examined at bedside.    Vital Signs Last 24 Hrs  T(F): 100.6 (14 Aug 2021 06:00), Max: 101.1 (14 Aug 2021 05:00)  HR: 116 (14 Aug 2021 06:00) (89 - 172)  BP: 102/56 (14 Aug 2021 06:00) (82/43 - 121/73)  BP(mean): 70 (14 Aug 2021 06:00) (56 - 92)  ABP: 91/43 (14 Aug 2021 06:00) (70/54 - 144/64)  ABP(mean): 56 (14 Aug 2021 06:00)  RR: 25 (14 Aug 2021 06:00) (4 - 39)  SpO2: 94% (14 Aug 2021 06:00) (92% - 100%)  CVP(mm Hg): 2 (13 Aug 2021 09:00)  CVP(cm H2O): --  CO: 6.2 (13 Aug 2021 09:00)  CI: 3.5 (13 Aug 2021 09:00)  PA: 25/8 (13 Aug 2021 09:00)  SVR: 863 (13 Aug 2021 09:00)    I&O's Detail    13 Aug 2021 07:01  -  14 Aug 2021 07:00  --------------------------------------------------------  IN:    Amiodarone: 333.3 mL    Insulin: 14 mL    IV PiggyBack: 650 mL    Oral Fluid: 1120 mL    sodium chloride 0.9%: 140 mL  Total IN: 2257.3 mL    OUT:    Chest Tube (mL): 0 mL    Chest Tube (mL): 140 mL    Chest Tube (mL): 180 mL    Indwelling Catheter - Urethral (mL): 1055 mL  Total OUT: 1375 mL        Net: I&O's Detail    12 Aug 2021 07:01  -  13 Aug 2021 07:00  --------------------------------------------------------  Total NET: -918.6 mL      13 Aug 2021 07:01  -  14 Aug 2021 07:00  --------------------------------------------------------  Total NET: 882.3 mL        CAPILLARY BLOOD GLUCOSE      POCT Blood Glucose.: 159 mg/dL (14 Aug 2021 06:57)  POCT Blood Glucose.: 207 mg/dL (13 Aug 2021 22:12)  POCT Blood Glucose.: 189 mg/dL (13 Aug 2021 16:13)  POCT Blood Glucose.: 189 mg/dL (13 Aug 2021 12:48)  POCT Blood Glucose.: 152 mg/dL (13 Aug 2021 11:17)  POCT Blood Glucose.: 148 mg/dL (13 Aug 2021 10:04)  POCT Blood Glucose.: 133 mg/dL (13 Aug 2021 08:27)    A1C with Estimated Average Glucose Result: 5.6 % (08-10-21 @ 07:56)      Physical Exam:  General: NAD; A&Ox3  Cardiac: S1/S2, RRR, no murmur, no rubs  Lungs: unlabored respirations, CTA b/l, no wheeze, no rales, no crackles  Abdomen: Soft/NT/ND; positive bowel sounds x 4  Sternum: Intact, no click, incision healing well with no drainage  Incisions: Incisions clean/dry/intact  Extremities: No edema b/l lower extremities; good capillary refill; no cyanosis; palpable 1+ pedal pulses b/l    Central Venous Catheter: Yes[]  No[] , If Yes indication: intravenous access       Day #  Vazquez Catheter: Yes  [] , No  [] , If yes indication: monitor strict i/o's                     Day #  OGT: Yes [] No [] ,    If Yes Placement:                                                   Day #  EPICARDIAL WIRES:  [] YES [] NO                                                            Day #  BOWEL MOVEMENT:  [] YES [] NO, If No, Timing since last BM Day #  CHEST TUBE(MS/Left/Right):  [] YES [] NO, If yes -  AIR LEAKS:  [] YES [] NO        LABS:                        9.8<L>  9.75  )-----------( 106<L>    ( 14 Aug 2021 01:09 )             28.2<L>                        10.0<L>  11.82<H> )-----------( 115<L>    ( 13 Aug 2021 01:50 )             28.5<L>    08-14    133<L>  |  101  |  19  ----------------------------<  187<H>  4.5   |  21  |  1.0  08-13    135  |  103  |  10  ----------------------------<  137<H>  4.5   |  20  |  0.7    Ca    7.9<L>      14 Aug 2021 01:09  Mg     2.6     08-14    TPro  5.2<L> [6.0 - 8.0]  /  Alb  3.8 [3.5 - 5.2]  /  TBili  0.6 [0.2 - 1.2]  /  DBili  x   /  AST  29 [0 - 41]  /  ALT  21 [0 - 41]  /  AlkPhos  40 [30 - 115]  08-14    PT/INR - ( 14 Aug 2021 01:28 )   PT: ;   INR: 1.57 ratio         PT/INR - ( 13 Aug 2021 01:50 )   PT: ;   INR: 1.49 ratio         PTT - ( 14 Aug 2021 01:28 )  PTT:29.3 sec, PTT - ( 13 Aug 2021 01:50 )  PTT:26.7 sec    ABG - ( 14 Aug 2021 04:46 )  pH: 7.43  /  pCO2: 35    /  pO2: 74    / HCO3: 23    / Base Excess: -1.0  /  SaO2: 96    /  LA: 1.0              RADIOLOGY & ADDITIONAL TESTS:  CXR:   EKG:  Allergies    No Known Allergies    Intolerances      MEDICATIONS  (STANDING):  acetaminophen  IVPB .. 1000 milliGRAM(s) IV Intermittent once  aMIOdarone Infusion 1 mG/Min (33.3 mL/Hr) IV Continuous <Continuous>  aspirin enteric coated 325 milliGRAM(s) Oral daily  atorvastatin Oral Tab/Cap - Peds 40 milliGRAM(s) Oral at bedtime  chlorhexidine 0.12% Liquid 5 milliLiter(s) Oral Mucosa two times a day  colchicine 0.6 milliGRAM(s) Oral every 12 hours  dextrose 40% Gel 15 Gram(s) Oral once  dextrose 5%. 1000 milliLiter(s) (50 mL/Hr) IV Continuous <Continuous>  dextrose 5%. 1000 milliLiter(s) (100 mL/Hr) IV Continuous <Continuous>  dextrose 50% Injectable 50 milliLiter(s) IV Push every 15 minutes  dextrose 50% Injectable 25 milliLiter(s) IV Push every 15 minutes  enoxaparin Injectable 40 milliGRAM(s) SubCutaneous daily  glucagon  Injectable 1 milliGRAM(s) IntraMuscular once  insulin lispro (ADMELOG) corrective regimen sliding scale   SubCutaneous three times a day before meals  magnesium sulfate  IVPB 1 Gram(s) IV Intermittent every 12 hours  meperidine     Injectable 25 milliGRAM(s) IV Push once  metoprolol tartrate 12.5 milliGRAM(s) Oral every 12 hours  pantoprazole    Tablet 40 milliGRAM(s) Oral before breakfast  polyethylene glycol 3350 17 Gram(s) Oral daily  sodium chloride 0.9%. 1000 milliLiter(s) (20 mL/Hr) IV Continuous <Continuous>    MEDICATIONS  (PRN):  ketorolac   Injectable 15 milliGRAM(s) IV Push every 4 hours PRN Moderate Pain (4 - 6)  oxyCODONE    IR 10 milliGRAM(s) Oral every 4 hours PRN Severe Pain (7 - 10)  oxyCODONE    IR 5 milliGRAM(s) Oral every 6 hours PRN Moderate Pain (4 - 6)    Home Medications:  amLODIPine 10 mg oral tablet: 1 tab(s) orally once a day (08 Aug 2021 17:45)  atenolol 50 mg oral tablet: 1 tab(s) orally once a day (08 Aug 2021 17:45)  Eliquis 5 mg oral tablet: 1 tab(s) orally 2 times a day (08 Aug 2021 17:45)  Lipitor 40 mg oral tablet: 1 tab(s) orally once a day (08 Aug 2021 17:45)  Multaq 400 mg oral tablet: 1 tab(s) orally 2 times a day (08 Aug 2021 17:44)    Pharmacologic DVT Prophylaxis [] YES, [] NO: Contraindication:  SCD's: YES b/l    GI Prophylaxis:     Post-Op Beta-Blockers: [] Yes, [] No: contraindication:  Post-Op CCB: [] Yes, [] No: contraindication:  Post-Op Aspirin:  [] Yes, [] No: contraindication:  Post-Op Statin:  [] Yes, [] No: contraindication:    Ambulation/Activity Status:    Assessment/Plan:  81y Male status-post  - Case and plan discussed with CTU Intensivist and CT Surgeon - Dr. Valero/Luis/Marie  - Continue CTU supportive care and ongoing plan of care as per continuing CTU rounds.   - Continue DVT/GI prophylaxis  - Incentive Spirometry 10 times an hour  - Continue to advance physical activity as tolerated and continue PT/OT as directed  1. CAD: Continue ASA, statin, BB  2. HTN:   3. A. Fib:   4. COPD/Hypoxia:   5. DM/Glucose Control:     Social Service Disposition:     OPERATIVE PROCEDURE(s):  s/p GABGx4 w/ATILIO clip              POD # 2                      SURGEON(s): ABHIJIT Smith  SUBJECTIVE ASSESSMENT:81y Male patient seen and examined at bedside.    Vital Signs Last 24 Hrs  T(F): 100.6 (14 Aug 2021 06:00), Max: 101.1 (14 Aug 2021 05:00)  HR: 116 (14 Aug 2021 06:00) (89 - 172)  BP: 102/56 (14 Aug 2021 06:00) (82/43 - 121/73)  BP(mean): 70 (14 Aug 2021 06:00) (56 - 92)  ABP: 91/43 (14 Aug 2021 06:00) (70/54 - 144/64)  ABP(mean): 56 (14 Aug 2021 06:00)  RR: 25 (14 Aug 2021 06:00) (4 - 39)  SpO2: 94% (14 Aug 2021 06:00) (92% - 100%)      I&O's Detail    13 Aug 2021 07:01  -  14 Aug 2021 07:00  --------------------------------------------------------  IN:    Amiodarone: 333.3 mL    Insulin: 14 mL    IV PiggyBack: 650 mL    Oral Fluid: 1120 mL    sodium chloride 0.9%: 140 mL  Total IN: 2257.3 mL    OUT:    Chest Tube (mL): 0 mL    Chest Tube (mL): 140 mL    Chest Tube (mL): 180 mL    Indwelling Catheter - Urethral (mL): 1055 mL  Total OUT: 1375 mL      Net: I&O's Detail    12 Aug 2021 07:01  -  13 Aug 2021 07:00  --------------------------------------------------------  Total NET: -918.6 mL      13 Aug 2021 07:01  -  14 Aug 2021 07:00  --------------------------------------------------------  Total NET: 882.3 mL        CAPILLARY BLOOD GLUCOSE      POCT Blood Glucose.: 159 mg/dL (14 Aug 2021 06:57)  POCT Blood Glucose.: 207 mg/dL (13 Aug 2021 22:12)  POCT Blood Glucose.: 189 mg/dL (13 Aug 2021 16:13)  POCT Blood Glucose.: 189 mg/dL (13 Aug 2021 12:48)  POCT Blood Glucose.: 152 mg/dL (13 Aug 2021 11:17)  POCT Blood Glucose.: 148 mg/dL (13 Aug 2021 10:04)  POCT Blood Glucose.: 133 mg/dL (13 Aug 2021 08:27)    A1C with Estimated Average Glucose Result: 5.6 % (08-10-21 @ 07:56)      Physical Exam:  General: NAD; A&Ox3  Cardiac: S1/S2, RRR, no murmur, no rubs  Lungs: unlabored respirations, CTA b/l, no wheeze, no rales, no crackles  Abdomen: Soft/NT/ND; positive bowel sounds x 4  Sternum: Intact, no click, incision healing well with no drainage  Incisions: Incisions clean/dry/intact  Extremities: No edema b/l lower extremities; good capillary refill; no cyanosis; palpable 1+ pedal pulses b/l    Central Venous Catheter: Yes[x]  No[] , If Yes indication: intravenous access       Day # 1  Vazquez Catheter: Yes  [x] , No  [] , If yes indication: monitor strict i/o's                     Day # 1  EPICARDIAL WIRES:  [x] YES [] NO                                                            Day # 1  BOWEL MOVEMENT:  [x] YES [] NO, If No, Timing since last BM Day #         LABS:                        9.8<L>  9.75  )-----------( 106<L>    ( 14 Aug 2021 01:09 )             28.2<L>                        10.0<L>  11.82<H> )-----------( 115<L>    ( 13 Aug 2021 01:50 )             28.5<L>    08-14    133<L>  |  101  |  19  ----------------------------<  187<H>  4.5   |  21  |  1.0  08-13    135  |  103  |  10  ----------------------------<  137<H>  4.5   |  20  |  0.7    Ca    7.9<L>      14 Aug 2021 01:09  Mg     2.6     08-14    TPro  5.2<L> [6.0 - 8.0]  /  Alb  3.8 [3.5 - 5.2]  /  TBili  0.6 [0.2 - 1.2]  /  DBili  x   /  AST  29 [0 - 41]  /  ALT  21 [0 - 41]  /  AlkPhos  40 [30 - 115]  08-14    PT/INR - ( 14 Aug 2021 01:28 )   PT: ;   INR: 1.57 ratio         PT/INR - ( 13 Aug 2021 01:50 )   PT: ;   INR: 1.49 ratio         PTT - ( 14 Aug 2021 01:28 )  PTT:29.3 sec, PTT - ( 13 Aug 2021 01:50 )  PTT:26.7 sec    ABG - ( 14 Aug 2021 04:46 )  pH: 7.43  /  pCO2: 35    /  pO2: 74    / HCO3: 23    / Base Excess: -1.0  /  SaO2: 96    /  LA: 1.0          Allergies    No Known Allergies    Intolerances      MEDICATIONS  (STANDING):  acetaminophen  IVPB .. 1000 milliGRAM(s) IV Intermittent once  aMIOdarone Infusion 1 mG/Min (33.3 mL/Hr) IV Continuous <Continuous>  aspirin enteric coated 325 milliGRAM(s) Oral daily  atorvastatin Oral Tab/Cap - Peds 40 milliGRAM(s) Oral at bedtime  chlorhexidine 0.12% Liquid 5 milliLiter(s) Oral Mucosa two times a day  colchicine 0.6 milliGRAM(s) Oral every 12 hours  dextrose 40% Gel 15 Gram(s) Oral once  dextrose 5%. 1000 milliLiter(s) (50 mL/Hr) IV Continuous <Continuous>  dextrose 5%. 1000 milliLiter(s) (100 mL/Hr) IV Continuous <Continuous>  dextrose 50% Injectable 50 milliLiter(s) IV Push every 15 minutes  dextrose 50% Injectable 25 milliLiter(s) IV Push every 15 minutes  enoxaparin Injectable 40 milliGRAM(s) SubCutaneous daily  glucagon  Injectable 1 milliGRAM(s) IntraMuscular once  insulin lispro (ADMELOG) corrective regimen sliding scale   SubCutaneous three times a day before meals  magnesium sulfate  IVPB 1 Gram(s) IV Intermittent every 12 hours  meperidine     Injectable 25 milliGRAM(s) IV Push once  metoprolol tartrate 12.5 milliGRAM(s) Oral every 12 hours  pantoprazole    Tablet 40 milliGRAM(s) Oral before breakfast  polyethylene glycol 3350 17 Gram(s) Oral daily  sodium chloride 0.9%. 1000 milliLiter(s) (20 mL/Hr) IV Continuous <Continuous>    MEDICATIONS  (PRN):  ketorolac   Injectable 15 milliGRAM(s) IV Push every 4 hours PRN Moderate Pain (4 - 6)  oxyCODONE    IR 10 milliGRAM(s) Oral every 4 hours PRN Severe Pain (7 - 10)  oxyCODONE    IR 5 milliGRAM(s) Oral every 6 hours PRN Moderate Pain (4 - 6)    Home Medications:  amLODIPine 10 mg oral tablet: 1 tab(s) orally once a day (08 Aug 2021 17:45)  atenolol 50 mg oral tablet: 1 tab(s) orally once a day (08 Aug 2021 17:45)  Eliquis 5 mg oral tablet: 1 tab(s) orally 2 times a day (08 Aug 2021 17:45)  Lipitor 40 mg oral tablet: 1 tab(s) orally once a day (08 Aug 2021 17:45)  Multaq 400 mg oral tablet: 1 tab(s) orally 2 times a day (08 Aug 2021 17:44)    Post-Op Beta-Blockers: [x] Yes, [] No: contraindication:  Post-Op Aspirin:  [x] Yes, [] No: contraindication:  Post-Op Statin:  [x] Yes, [] No: contraindication:      Assessment/Plan:  81y Male status-post  GABGx4 w/ATILIO clip               POD # 2  - Case and plan discussed with CTU Intensivist and CT Surgeon - Dr. Smith  - Continue CTU supportive care and ongoing plan of care as per continuing CTU rounds.   - Continue DVT/GI prophylaxis  - Incentive Spirometry 10 times an hour  - Continue to advance physical activity as tolerated and continue PT/OT as directed  1. CAD s/p CABG: Continue ASA, statin, BB, Pain control as needed   2. Pre-op A-fib: currently paroxysmal afib; rate controlled on amiodarone; restart home dose Eliquis for a/c.  3. COPD/Hypoxia: Cont Ipratropium, wean supplemental O2 as tolerated and encourage incentive spirometer.   4. DM/Glucose Control: A1c 5.6%, fs well controlled; cont RISS.

## 2021-08-15 LAB
ALBUMIN SERPL ELPH-MCNC: 3.3 G/DL — LOW (ref 3.5–5.2)
ALP SERPL-CCNC: 54 U/L — SIGNIFICANT CHANGE UP (ref 30–115)
ALT FLD-CCNC: 20 U/L — SIGNIFICANT CHANGE UP (ref 0–41)
ANION GAP SERPL CALC-SCNC: 10 MMOL/L — SIGNIFICANT CHANGE UP (ref 7–14)
AST SERPL-CCNC: 28 U/L — SIGNIFICANT CHANGE UP (ref 0–41)
BASOPHILS # BLD AUTO: 0.01 K/UL — SIGNIFICANT CHANGE UP (ref 0–0.2)
BASOPHILS NFR BLD AUTO: 0.1 % — SIGNIFICANT CHANGE UP (ref 0–1)
BILIRUB SERPL-MCNC: 0.6 MG/DL — SIGNIFICANT CHANGE UP (ref 0.2–1.2)
BUN SERPL-MCNC: 18 MG/DL — SIGNIFICANT CHANGE UP (ref 10–20)
CALCIUM SERPL-MCNC: 7.8 MG/DL — LOW (ref 8.5–10.1)
CHLORIDE SERPL-SCNC: 97 MMOL/L — LOW (ref 98–110)
CO2 SERPL-SCNC: 20 MMOL/L — SIGNIFICANT CHANGE UP (ref 17–32)
CREAT SERPL-MCNC: 0.9 MG/DL — SIGNIFICANT CHANGE UP (ref 0.7–1.5)
EOSINOPHIL # BLD AUTO: 0.06 K/UL — SIGNIFICANT CHANGE UP (ref 0–0.7)
EOSINOPHIL NFR BLD AUTO: 0.7 % — SIGNIFICANT CHANGE UP (ref 0–8)
GLUCOSE BLDC GLUCOMTR-MCNC: 139 MG/DL — HIGH (ref 70–99)
GLUCOSE BLDC GLUCOMTR-MCNC: 148 MG/DL — HIGH (ref 70–99)
GLUCOSE BLDC GLUCOMTR-MCNC: 157 MG/DL — HIGH (ref 70–99)
GLUCOSE BLDC GLUCOMTR-MCNC: 179 MG/DL — HIGH (ref 70–99)
GLUCOSE SERPL-MCNC: 175 MG/DL — HIGH (ref 70–99)
HCT VFR BLD CALC: 26.2 % — LOW (ref 42–52)
HGB BLD-MCNC: 9.1 G/DL — LOW (ref 14–18)
IMM GRANULOCYTES NFR BLD AUTO: 0.7 % — HIGH (ref 0.1–0.3)
LYMPHOCYTES # BLD AUTO: 1.02 K/UL — LOW (ref 1.2–3.4)
LYMPHOCYTES # BLD AUTO: 11.2 % — LOW (ref 20.5–51.1)
MAGNESIUM SERPL-MCNC: 2.3 MG/DL — SIGNIFICANT CHANGE UP (ref 1.8–2.4)
MCHC RBC-ENTMCNC: 31.7 PG — HIGH (ref 27–31)
MCHC RBC-ENTMCNC: 34.7 G/DL — SIGNIFICANT CHANGE UP (ref 32–37)
MCV RBC AUTO: 91.3 FL — SIGNIFICANT CHANGE UP (ref 80–94)
MONOCYTES # BLD AUTO: 1.07 K/UL — HIGH (ref 0.1–0.6)
MONOCYTES NFR BLD AUTO: 11.7 % — HIGH (ref 1.7–9.3)
NEUTROPHILS # BLD AUTO: 6.91 K/UL — HIGH (ref 1.4–6.5)
NEUTROPHILS NFR BLD AUTO: 75.6 % — HIGH (ref 42.2–75.2)
NRBC # BLD: 0 /100 WBCS — SIGNIFICANT CHANGE UP (ref 0–0)
PLATELET # BLD AUTO: 106 K/UL — LOW (ref 130–400)
POTASSIUM SERPL-MCNC: 4.2 MMOL/L — SIGNIFICANT CHANGE UP (ref 3.5–5)
POTASSIUM SERPL-SCNC: 4.2 MMOL/L — SIGNIFICANT CHANGE UP (ref 3.5–5)
PROT SERPL-MCNC: 4.8 G/DL — LOW (ref 6–8)
RBC # BLD: 2.87 M/UL — LOW (ref 4.7–6.1)
RBC # FLD: 11.6 % — SIGNIFICANT CHANGE UP (ref 11.5–14.5)
SODIUM SERPL-SCNC: 127 MMOL/L — LOW (ref 135–146)
WBC # BLD: 9.13 K/UL — SIGNIFICANT CHANGE UP (ref 4.8–10.8)
WBC # FLD AUTO: 9.13 K/UL — SIGNIFICANT CHANGE UP (ref 4.8–10.8)

## 2021-08-15 PROCEDURE — 93010 ELECTROCARDIOGRAM REPORT: CPT

## 2021-08-15 PROCEDURE — 99233 SBSQ HOSP IP/OBS HIGH 50: CPT

## 2021-08-15 PROCEDURE — 71045 X-RAY EXAM CHEST 1 VIEW: CPT | Mod: 26

## 2021-08-15 RX ORDER — POTASSIUM CHLORIDE 20 MEQ
20 PACKET (EA) ORAL
Refills: 0 | Status: COMPLETED | OUTPATIENT
Start: 2021-08-15 | End: 2021-08-15

## 2021-08-15 RX ORDER — ACETAMINOPHEN 500 MG
650 TABLET ORAL EVERY 6 HOURS
Refills: 0 | Status: DISCONTINUED | OUTPATIENT
Start: 2021-08-15 | End: 2021-08-19

## 2021-08-15 RX ORDER — FUROSEMIDE 40 MG
40 TABLET ORAL ONCE
Refills: 0 | Status: COMPLETED | OUTPATIENT
Start: 2021-08-15 | End: 2021-08-15

## 2021-08-15 RX ORDER — AMIODARONE HYDROCHLORIDE 400 MG/1
400 TABLET ORAL EVERY 12 HOURS
Refills: 0 | Status: COMPLETED | OUTPATIENT
Start: 2021-08-15 | End: 2021-08-17

## 2021-08-15 RX ADMIN — Medication 100 GRAM(S): at 05:31

## 2021-08-15 RX ADMIN — Medication 400 MILLIGRAM(S): at 05:30

## 2021-08-15 RX ADMIN — Medication 20 MILLIEQUIVALENT(S): at 07:45

## 2021-08-15 RX ADMIN — Medication 400 MILLIGRAM(S): at 21:30

## 2021-08-15 RX ADMIN — Medication 400 MILLIGRAM(S): at 13:03

## 2021-08-15 RX ADMIN — Medication 100 GRAM(S): at 17:37

## 2021-08-15 RX ADMIN — Medication 650 MILLIGRAM(S): at 13:00

## 2021-08-15 RX ADMIN — Medication 40 MILLIGRAM(S): at 06:43

## 2021-08-15 RX ADMIN — Medication 81 MILLIGRAM(S): at 11:21

## 2021-08-15 RX ADMIN — Medication 2: at 11:31

## 2021-08-15 RX ADMIN — ATORVASTATIN CALCIUM 40 MILLIGRAM(S): 80 TABLET, FILM COATED ORAL at 21:38

## 2021-08-15 RX ADMIN — Medication 20 MILLIEQUIVALENT(S): at 06:43

## 2021-08-15 RX ADMIN — PANTOPRAZOLE SODIUM 40 MILLIGRAM(S): 20 TABLET, DELAYED RELEASE ORAL at 07:45

## 2021-08-15 RX ADMIN — APIXABAN 5 MILLIGRAM(S): 2.5 TABLET, FILM COATED ORAL at 05:31

## 2021-08-15 RX ADMIN — AMIODARONE HYDROCHLORIDE 400 MILLIGRAM(S): 400 TABLET ORAL at 11:20

## 2021-08-15 RX ADMIN — Medication 12.5 MILLIGRAM(S): at 11:21

## 2021-08-15 RX ADMIN — Medication 12.5 MILLIGRAM(S): at 17:37

## 2021-08-15 RX ADMIN — Medication 400 MILLIGRAM(S): at 22:00

## 2021-08-15 RX ADMIN — Medication 0.6 MILLIGRAM(S): at 05:30

## 2021-08-15 RX ADMIN — Medication 12.5 MILLIGRAM(S): at 11:22

## 2021-08-15 RX ADMIN — AMIODARONE HYDROCHLORIDE 400 MILLIGRAM(S): 400 TABLET ORAL at 17:37

## 2021-08-15 RX ADMIN — Medication 12.5 MILLIGRAM(S): at 05:30

## 2021-08-15 RX ADMIN — Medication 400 MILLIGRAM(S): at 06:00

## 2021-08-15 RX ADMIN — TAMSULOSIN HYDROCHLORIDE 0.4 MILLIGRAM(S): 0.4 CAPSULE ORAL at 21:38

## 2021-08-15 RX ADMIN — APIXABAN 5 MILLIGRAM(S): 2.5 TABLET, FILM COATED ORAL at 17:37

## 2021-08-15 NOTE — PROGRESS NOTE ADULT - ASSESSMENT
Assessment/Plan:  CAD-s/p CABG x 4-POD #3  1-BP control-beta-blockers  2-serum glucose control-insulin sliding scale correction regimen  3-fluid overload-diuresis  9-R-Zko-continue amiodarone, metoprolol, apixaban  5-acute blood loss anemia/thrombocytopenia-stable, monitor Hb/Hct/plts daily  4-[id number] Flomax

## 2021-08-15 NOTE — PROGRESS NOTE ADULT - SUBJECTIVE AND OBJECTIVE BOX
OPERATIVE PROCEDURE(s):                POD #  3 CABG with ATILIO clip                     81yMale  SURGEON(s): ABHIJIT Smith  SUBJECTIVE ASSESSMENT: progressing slowly    Vital Signs Last 24 Hrs  T(F): 99.7 (15 Aug 2021 06:00), Max: 100.4 (14 Aug 2021 20:00)  HR: 116 (15 Aug 2021 07:00) (97 - 131)  BP: 112/62 (15 Aug 2021 05:00) (94/63 - 120/63)  BP(mean): 81 (15 Aug 2021 05:00) (65 - 87)  ABP: 123/51 (15 Aug 2021 07:00) (99/39 - 154/65)  ABP(mean): 72 (15 Aug 2021 07:00)  RR: 26 (15 Aug 2021 07:00) (10 - 43)  SpO2: 99% (15 Aug 2021 07:00) (92% - 100%)    I&O's Detail    14 Aug 2021 07:01  -  15 Aug 2021 07:00  --------------------------------------------------------  IN:    Amiodarone: 99.9 mL    Amiodarone: 315.4 mL    IV PiggyBack: 200 mL    Oral Fluid: 1280 mL  Total IN: 1895.3 mL    OUT:    Indwelling Catheter - Urethral (mL): 1780 mL  Total OUT: 1780 mL      Net:   I&O's Detail    13 Aug 2021 07:01  -  14 Aug 2021 07:00  --------------------------------------------------------  Total NET: 1090.6 mL      14 Aug 2021 07:01  -  15 Aug 2021 07:00  --------------------------------------------------------  Total NET: 115.3 mL      CAPILLARY BLOOD GLUCOSE      POCT Blood Glucose.: 148 mg/dL (15 Aug 2021 07:09)  POCT Blood Glucose.: 248 mg/dL (14 Aug 2021 23:02)  POCT Blood Glucose.: 156 mg/dL (14 Aug 2021 16:21)  POCT Blood Glucose.: 235 mg/dL (14 Aug 2021 11:33)    Physical Exam:  General: NAD; A&Ox3  Cardiac: S1/S2, RRR, no murmur, no rubs  Lungs: unlabored respirations, CTA b/l, no wheeze, no rales, no crackles  Abdomen: Soft/NT/protuberant  Sternum: Intact, no click, incision healing well with no drainage  Incisions: Incisions clean/dry/intact  Extremities: No edema b/l lower extremities    Central Venous Catheter: Yes[]  critical patient   Vazquez Catheter: Yes  [] , critical patient strict I&O  NGT: Yes []   EPICARDIAL WIRES:  [] YES  BOWEL MOVEMENT:  [] YES [] NO, If No, Timing since last BM:      Day #  CHEST TUBE(Left/Right):  [] YES [] NO, If yes -  AIR LEAKS:  [] YES [] NO        LABS:                        9.1<L>  9.13  )-----------( 106<L>    ( 15 Aug 2021 01:09 )             26.2<L>                        9.8<L>  9.75  )-----------( 106<L>    ( 14 Aug 2021 01:09 )             28.2<L>    08-15    127<L>  |  97<L>  |  18  ----------------------------<  175<H>  4.2   |  20  |  0.9  08-14    133<L>  |  101  |  19  ----------------------------<  187<H>  4.5   |  21  |  1.0    Ca    7.8<L>      15 Aug 2021 01:09  Mg     2.3     08-15    TPro  4.8<L> [6.0 - 8.0]  /  Alb  3.3<L> [3.5 - 5.2]  /  TBili  0.6 [0.2 - 1.2]  /  DBili  x   /  AST  28 [0 - 41]  /  ALT  20 [0 - 41]  /  AlkPhos  54 [30 - 115]  08-15    PT/INR - ( 14 Aug 2021 01:28 )   PT: ;   INR: 1.57 ratio         PTT - ( 14 Aug 2021 01:28 )  PTT:29.3 sec    ABG - ( 15 Aug 2021 03:14 )  pH: 7.43  /  pCO2: 35    /  pO2: 105   / HCO3: 23    / Base Excess: -1.1  /  SaO2: 99    /  LA: 0.9      RADIOLOGY & ADDITIONAL TESTS:  CXR:  EKG:  MEDICATIONS  (STANDING):  acetaminophen  IVPB .. 1000 milliGRAM(s) IV Intermittent once  aMIOdarone Infusion 0.5 mG/Min (16.7 mL/Hr) IV Continuous <Continuous>  apixaban 5 milliGRAM(s) Oral every 12 hours  aspirin enteric coated 81 milliGRAM(s) Oral daily  atorvastatin Oral Tab/Cap - Peds 40 milliGRAM(s) Oral at bedtime  colchicine 0.6 milliGRAM(s) Oral every 12 hours  dextrose 40% Gel 15 Gram(s) Oral once  dextrose 5%. 1000 milliLiter(s) (50 mL/Hr) IV Continuous <Continuous>  dextrose 5%. 1000 milliLiter(s) (100 mL/Hr) IV Continuous <Continuous>  dextrose 50% Injectable 50 milliLiter(s) IV Push every 15 minutes  dextrose 50% Injectable 25 milliLiter(s) IV Push every 15 minutes  glucagon  Injectable 1 milliGRAM(s) IntraMuscular once  ibuprofen  Tablet. 400 milliGRAM(s) Oral every 8 hours  insulin lispro (ADMELOG) corrective regimen sliding scale   SubCutaneous three times a day before meals  magnesium sulfate  IVPB 1 Gram(s) IV Intermittent every 12 hours  meperidine     Injectable 25 milliGRAM(s) IV Push once  metoprolol tartrate 12.5 milliGRAM(s) Oral every 6 hours  pantoprazole    Tablet 40 milliGRAM(s) Oral before breakfast  polyethylene glycol 3350 17 Gram(s) Oral daily  potassium chloride    Tablet ER 20 milliEquivalent(s) Oral every 2 hours  senna 2 Tablet(s) Oral at bedtime  sodium chloride 0.9%. 1000 milliLiter(s) (20 mL/Hr) IV Continuous <Continuous>  tamsulosin 0.4 milliGRAM(s) Oral at bedtime    MEDICATIONS  (PRN):  ketorolac   Injectable 15 milliGRAM(s) IV Push every 4 hours PRN Moderate Pain (4 - 6)  oxyCODONE    IR 10 milliGRAM(s) Oral every 4 hours PRN Severe Pain (7 - 10)  oxyCODONE    IR 5 milliGRAM(s) Oral every 6 hours PRN Moderate Pain (4 - 6)    Allergies    No Known Allergies    Intolerances      Ambulation/Activity Status: ambulate as tolerated    Assessment/Plan:  81y Male status-post  GABGx4 w/ATILIO clip               POD # 3  - Case and plan discussed with CTU Intensivist and CT Surgeon - Dr. Smith  - Continue CTU supportive care and ongoing plan of care as per continuing CTU rounds.   - Continue DVT/GI prophylaxis  - Incentive Spirometry 10 times an hour  - Continue to advance physical activity as tolerated and continue PT/OT as directed  1. CAD s/p CABG: Continue ASA, statin, BB, Pain control as needed   2. Pre-op A-fib: currently paroxysmal afib; rate controlled on amiodarone;  Eliquis for a/c.  3. COPD/Hypoxia: Cont Ipratropium, wean supplemental O2 as tolerated and encourage incentive spirometer.   4. DM/Glucose Control: A1c 5.6%, fs well controlled; cont RISS.     Social Service Disposition:  anticipate home in 48-72 hours   OPERATIVE PROCEDURE(s):                POD #  3 CABG with ATILIO clip                     81yMale  SURGEON(s): ABHIJIT Smith  SUBJECTIVE ASSESSMENT: progressing slowly, Had Temp yesterday    Vital Signs Last 24 Hrs  T(F): 99.7 (15 Aug 2021 06:00), Max: 100.4 (14 Aug 2021 20:00)  HR: 116 (15 Aug 2021 07:00) (97 - 131)  BP: 112/62 (15 Aug 2021 05:00) (94/63 - 120/63)  BP(mean): 81 (15 Aug 2021 05:00) (65 - 87)  ABP: 123/51 (15 Aug 2021 07:00) (99/39 - 154/65)  ABP(mean): 72 (15 Aug 2021 07:00)  RR: 26 (15 Aug 2021 07:00) (10 - 43)  SpO2: 99% (15 Aug 2021 07:00) (92% - 100%)    I&O's Detail    14 Aug 2021 07:01  -  15 Aug 2021 07:00  --------------------------------------------------------  IN:    Amiodarone: 99.9 mL    Amiodarone: 315.4 mL    IV PiggyBack: 200 mL    Oral Fluid: 1280 mL  Total IN: 1895.3 mL    OUT:    Indwelling Catheter - Urethral (mL): 1780 mL  Total OUT: 1780 mL      Net:   I&O's Detail    13 Aug 2021 07:01  -  14 Aug 2021 07:00  --------------------------------------------------------  Total NET: 1090.6 mL      14 Aug 2021 07:01  -  15 Aug 2021 07:00  --------------------------------------------------------  Total NET: 115.3 mL      CAPILLARY BLOOD GLUCOSE      POCT Blood Glucose.: 148 mg/dL (15 Aug 2021 07:09)  POCT Blood Glucose.: 248 mg/dL (14 Aug 2021 23:02)  POCT Blood Glucose.: 156 mg/dL (14 Aug 2021 16:21)  POCT Blood Glucose.: 235 mg/dL (14 Aug 2021 11:33)    Physical Exam:  General: NAD; A&Ox3  Cardiac: S1/S2, RRR, no murmur, no rubs  Lungs: unlabored respirations, CTA b/l, no wheeze, no rales, no crackles  Abdomen: Soft/NT/protuberant  Sternum: Intact, no click, incision healing well with no drainage  Incisions: Incisions clean/dry/intact  Extremities: No edema b/l lower extremities    Central Venous Catheter: Yes[]  critical patient   Latham Catheter: Yes  [] , critical patient strict I&O  EPICARDIAL WIRES:  [] YES  BOWEL MOVEMENT: no    LABS:                        9.1<L>  9.13  )-----------( 106<L>    ( 15 Aug 2021 01:09 )             26.2<L>                        9.8<L>  9.75  )-----------( 106<L>    ( 14 Aug 2021 01:09 )             28.2<L>    08-15    127<L>  |  97<L>  |  18  ----------------------------<  175<H>  4.2   |  20  |  0.9  08-14    133<L>  |  101  |  19  ----------------------------<  187<H>  4.5   |  21  |  1.0    Ca    7.8<L>      15 Aug 2021 01:09  Mg     2.3     08-15    TPro  4.8<L> [6.0 - 8.0]  /  Alb  3.3<L> [3.5 - 5.2]  /  TBili  0.6 [0.2 - 1.2]  /  DBili  x   /  AST  28 [0 - 41]  /  ALT  20 [0 - 41]  /  AlkPhos  54 [30 - 115]  08-15    PT/INR - ( 14 Aug 2021 01:28 )   PT: ;   INR: 1.57 ratio         PTT - ( 14 Aug 2021 01:28 )  PTT:29.3 sec    ABG - ( 15 Aug 2021 03:14 )  pH: 7.43  /  pCO2: 35    /  pO2: 105   / HCO3: 23    / Base Excess: -1.1  /  SaO2: 99    /  LA: 0.9      RADIOLOGY & ADDITIONAL TESTS:  CXR: < from: Xray Chest 1 View- PORTABLE-Routine (08.15.21 @ 06:44) >  Decreased vascular congestion. Small bilateral pleural effusions unchanged. No air leak.    < end of copied text >  < from: 12 Lead ECG (08.15.21 @ 07:44) >  Ventricular Rate 114 BPM    Atrial Rate 141 BPM    QRS Duration 90 ms    Q-T Interval 310 ms    QTC Calculation(Bazett) 427 ms    R Axis 15 degrees    T Axis 61 degrees    Diagnosis Line Atrial fibrillation with rapid ventricular response  Inferiorinfarct , age undetermined  Abnormal ECG    < end of copied text >    EKG:  MEDICATIONS  (STANDING):  acetaminophen  IVPB .. 1000 milliGRAM(s) IV Intermittent once  aMIOdarone Infusion 0.5 mG/Min (16.7 mL/Hr) IV Continuous <Continuous>  apixaban 5 milliGRAM(s) Oral every 12 hours  aspirin enteric coated 81 milliGRAM(s) Oral daily  atorvastatin Oral Tab/Cap - Peds 40 milliGRAM(s) Oral at bedtime  colchicine 0.6 milliGRAM(s) Oral every 12 hours  dextrose 40% Gel 15 Gram(s) Oral once  dextrose 5%. 1000 milliLiter(s) (50 mL/Hr) IV Continuous <Continuous>  dextrose 5%. 1000 milliLiter(s) (100 mL/Hr) IV Continuous <Continuous>  dextrose 50% Injectable 50 milliLiter(s) IV Push every 15 minutes  dextrose 50% Injectable 25 milliLiter(s) IV Push every 15 minutes  glucagon  Injectable 1 milliGRAM(s) IntraMuscular once  ibuprofen  Tablet. 400 milliGRAM(s) Oral every 8 hours  insulin lispro (ADMELOG) corrective regimen sliding scale   SubCutaneous three times a day before meals  magnesium sulfate  IVPB 1 Gram(s) IV Intermittent every 12 hours  meperidine     Injectable 25 milliGRAM(s) IV Push once  metoprolol tartrate 12.5 milliGRAM(s) Oral every 6 hours  pantoprazole    Tablet 40 milliGRAM(s) Oral before breakfast  polyethylene glycol 3350 17 Gram(s) Oral daily  potassium chloride    Tablet ER 20 milliEquivalent(s) Oral every 2 hours  senna 2 Tablet(s) Oral at bedtime  sodium chloride 0.9%. 1000 milliLiter(s) (20 mL/Hr) IV Continuous <Continuous>  tamsulosin 0.4 milliGRAM(s) Oral at bedtime    MEDICATIONS  (PRN):  ketorolac   Injectable 15 milliGRAM(s) IV Push every 4 hours PRN Moderate Pain (4 - 6)  oxyCODONE    IR 10 milliGRAM(s) Oral every 4 hours PRN Severe Pain (7 - 10)  oxyCODONE    IR 5 milliGRAM(s) Oral every 6 hours PRN Moderate Pain (4 - 6)    Allergies    No Known Allergies    Intolerances      Ambulation/Activity Status: ambulate as tolerated    Assessment/Plan:  81y Male status-post  GABGx4 w/ATILIO clip               POD # 3  - Case and plan discussed with CTU Intensivist and CT Surgeon - Dr. Smith  - Continue CTU supportive care and ongoing plan of care as per continuing CTU rounds.   - Continue DVT/GI prophylaxis  - Incentive Spirometry 10 times an hour  - Continue to advance physical activity as tolerated and continue PT/OT as directed  1. CAD s/p CABG: Continue ASA, statin, BB, Pain control as needed   2. Pre-op A-fib: currently paroxysmal afib; rate controlled on amiodarone will change to PO;  Eliquis for a/c.  3. COPD/Hypoxia: Cont Ipratropium, wean supplemental O2 as tolerated and encourage incentive spirometer.   4. DM/Glucose Control: A1c 5.6%, fs improving; cont RISS.   5. Acute PO Blood loss anemia, track and trend H?H  6. BPH - continue tamsulosin   7. Hyponatremia - track and trend  8. Hyperthermia will discontinue latham  9. pulmonary congestion treated with lasix    Social Service Disposition:  anticipate home in 48-72 hours

## 2021-08-15 NOTE — PROGRESS NOTE ADULT - SUBJECTIVE AND OBJECTIVE BOX
RADHA BAKER  MRN#: [id number]  Subjective:  Patient was seen and evalauted on AM rounds offerring no specific compliants at this time.    OBJECTIVE:  ICU Vital Signs Last 24 Hrs  T(C): 37.8 (15 Aug 2021 11:00), Max: 38 (14 Aug 2021 20:00)  T(F): 100 (15 Aug 2021 11:00), Max: 100.4 (14 Aug 2021 20:00)  HR: 116 (15 Aug 2021 11:00) (101 - 131)  BP: 124/61 (15 Aug 2021 11:00) (94/63 - 124/61)  BP(mean): 87 (15 Aug 2021 11:00) (65 - 87)  ABP: 155/53 (15 Aug 2021 11:00) (99/39 - 155/53)  ABP(mean): 76 (15 Aug 2021 11:00) (56 - 76)  RR: 23 (15 Aug 2021 11:00) (8 - 43)  SpO2: 99% (15 Aug 2021 11:00) (87% - 100%)      08-14 @ 07:01  -  08-15 @ 07:00  --------------------------------------------------------  IN: 1895.3 mL / OUT: 1780 mL / NET: 115.3 mL    08-15 @ 07:01  -  08-15 @ 11:48  --------------------------------------------------------  IN: 170.1 mL / OUT: 1655 mL / NET: -1484.9 mL      CAPILLARY BLOOD GLUCOSE      POCT Blood Glucose.: 179 mg/dL (15 Aug 2021 11:28)      PHYSICAL EXAM:Daily     Daily   General: WN/WD NAD    HEENT:     + NCAT  + EOMI  - Conjuctival edema   - Icterus   - Thrush   - ETT  - NGT/OGT    Neck:         + FROM    - JVD     - Nodes     - Masses    + Mid-line trachea   - Tracheostomy    Chest:         - Sternal click  - Sternal drainage  + Pacing wires  + Chest tubes  - SubQ emphysema    Lungs:          + CTA   - Rhonchi    - Rales    - Wheezing     - Decreased BS   - Dullness R L    Cardiac:       + S1 + S2    + RRR   - Irregular   - S3  - S4    - Murmurs   - Rub   - Hamman’s sign     Abdomen:    + BS     + Soft    + Non-tender     - Distended    - Organomegaly  - PEG    Extremities:   - Cyanosis U/L   - Clubbing  U/L  - LE/UE Edema   + Capillary refill    + Pulses     Neuro:        + Awake   +  Alert   - Confused   - Lethargic   - Sedated   - Generalized Weakness    Skin:        - Rashes    - Erythema   + Normal incisions   + IV sites intact  - Sacral decubitus    HOSPITAL MEDICATIONS:  MEDICATIONS  (STANDING):  acetaminophen  IVPB .. 1000 milliGRAM(s) IV Intermittent once  aMIOdarone    Tablet 400 milliGRAM(s) Oral every 12 hours  apixaban 5 milliGRAM(s) Oral every 12 hours  aspirin enteric coated 81 milliGRAM(s) Oral daily  atorvastatin Oral Tab/Cap - Peds 40 milliGRAM(s) Oral at bedtime  dextrose 40% Gel 15 Gram(s) Oral once  dextrose 5%. 1000 milliLiter(s) (50 mL/Hr) IV Continuous <Continuous>  dextrose 5%. 1000 milliLiter(s) (100 mL/Hr) IV Continuous <Continuous>  dextrose 50% Injectable 50 milliLiter(s) IV Push every 15 minutes  dextrose 50% Injectable 25 milliLiter(s) IV Push every 15 minutes  glucagon  Injectable 1 milliGRAM(s) IntraMuscular once  ibuprofen  Tablet. 400 milliGRAM(s) Oral every 8 hours  insulin lispro (ADMELOG) corrective regimen sliding scale   SubCutaneous three times a day before meals  magnesium sulfate  IVPB 1 Gram(s) IV Intermittent every 12 hours  meperidine     Injectable 25 milliGRAM(s) IV Push once  metoprolol tartrate 12.5 milliGRAM(s) Oral every 6 hours  pantoprazole    Tablet 40 milliGRAM(s) Oral before breakfast  senna 2 Tablet(s) Oral at bedtime  sodium chloride 0.9%. 1000 milliLiter(s) (20 mL/Hr) IV Continuous <Continuous>  tamsulosin 0.4 milliGRAM(s) Oral at bedtime    MEDICATIONS  (PRN):  ketorolac   Injectable 15 milliGRAM(s) IV Push every 4 hours PRN Moderate Pain (4 - 6)  oxyCODONE    IR 10 milliGRAM(s) Oral every 4 hours PRN Severe Pain (7 - 10)  oxyCODONE    IR 5 milliGRAM(s) Oral every 6 hours PRN Moderate Pain (4 - 6)      LABS:                        9.1    9.13  )-----------( 106      ( 15 Aug 2021 01:09 )             26.2    08-15    127<L>  |  97<L>  |  18  ----------------------------<  175<H>  4.2   |  20  |  0.9    Ca    7.8<L>      15 Aug 2021 01:09  Mg     2.3     08-15    TPro  4.8<L>  /  Alb  3.3<L>  /  TBili  0.6  /  DBili  x   /  AST  28  /  ALT  20  /  AlkPhos  54  08-15    PT/INR - ( 14 Aug 2021 01:28 )   PT: 18.00 sec;   INR: 1.57 ratio         PTT - ( 14 Aug 2021 01:28 )  PTT:29.3 sec LIVER FUNCTIONS - ( 15 Aug 2021 01:09 )  Alb: 3.3 g/dL / Pro: 4.8 g/dL / ALK PHOS: 54 U/L / ALT: 20 U/L / AST: 28 U/L / GGT: x               RADIOLOGY:  X Reviewed and interpreted by me: bilateral opacities    CARDIOPULMONARY DYSFUNCTION  - Respiratory status required supplemental oxygen & the following of continuous pulse oximetry for support & to prevent decompensation  - Continued early mobilization as tolerated  - Addressed analgesic regimen to optimize function    PREVENTION-PROPHYLAXIS  - ASA continued for graft occlusion-thromboembolism prophylaxis  - Lipitor was also started for long term graft patency  - VTE prophylaxis-Venodyne boots  - Protonix maintained for GI bleeding prophylaxis  - Lopressor continued for atrial fibrillation prophylaxis  - Metabolic stability & infection prophylaxis required review and adjustment of regular Insulin sliding scale and gylcemic regimen while following serial glucose levels to help achieve & maintain euglycemia  - Reviewed & addressed surgical site infection prophylaxis regimen

## 2021-08-16 LAB
ALBUMIN SERPL ELPH-MCNC: 3.2 G/DL — LOW (ref 3.5–5.2)
ALP SERPL-CCNC: 79 U/L — SIGNIFICANT CHANGE UP (ref 30–115)
ALT FLD-CCNC: 40 U/L — SIGNIFICANT CHANGE UP (ref 0–41)
ANION GAP SERPL CALC-SCNC: 9 MMOL/L — SIGNIFICANT CHANGE UP (ref 7–14)
AST SERPL-CCNC: 34 U/L — SIGNIFICANT CHANGE UP (ref 0–41)
BASOPHILS # BLD AUTO: 0.01 K/UL — SIGNIFICANT CHANGE UP (ref 0–0.2)
BASOPHILS NFR BLD AUTO: 0.1 % — SIGNIFICANT CHANGE UP (ref 0–1)
BILIRUB SERPL-MCNC: 0.7 MG/DL — SIGNIFICANT CHANGE UP (ref 0.2–1.2)
BUN SERPL-MCNC: 26 MG/DL — HIGH (ref 10–20)
CALCIUM SERPL-MCNC: 8 MG/DL — LOW (ref 8.5–10.1)
CHLORIDE SERPL-SCNC: 100 MMOL/L — SIGNIFICANT CHANGE UP (ref 98–110)
CO2 SERPL-SCNC: 21 MMOL/L — SIGNIFICANT CHANGE UP (ref 17–32)
CREAT SERPL-MCNC: 1 MG/DL — SIGNIFICANT CHANGE UP (ref 0.7–1.5)
EOSINOPHIL # BLD AUTO: 0.12 K/UL — SIGNIFICANT CHANGE UP (ref 0–0.7)
EOSINOPHIL NFR BLD AUTO: 1.2 % — SIGNIFICANT CHANGE UP (ref 0–8)
GLUCOSE BLDC GLUCOMTR-MCNC: 122 MG/DL — HIGH (ref 70–99)
GLUCOSE BLDC GLUCOMTR-MCNC: 122 MG/DL — HIGH (ref 70–99)
GLUCOSE BLDC GLUCOMTR-MCNC: 128 MG/DL — HIGH (ref 70–99)
GLUCOSE BLDC GLUCOMTR-MCNC: 135 MG/DL — HIGH (ref 70–99)
GLUCOSE SERPL-MCNC: 116 MG/DL — HIGH (ref 70–99)
HCT VFR BLD CALC: 24.9 % — LOW (ref 42–52)
HGB BLD-MCNC: 9 G/DL — LOW (ref 14–18)
IMM GRANULOCYTES NFR BLD AUTO: 1.1 % — HIGH (ref 0.1–0.3)
LYMPHOCYTES # BLD AUTO: 1.51 K/UL — SIGNIFICANT CHANGE UP (ref 1.2–3.4)
LYMPHOCYTES # BLD AUTO: 14.7 % — LOW (ref 20.5–51.1)
MAGNESIUM SERPL-MCNC: 2.3 MG/DL — SIGNIFICANT CHANGE UP (ref 1.8–2.4)
MCHC RBC-ENTMCNC: 32.8 PG — HIGH (ref 27–31)
MCHC RBC-ENTMCNC: 36.1 G/DL — SIGNIFICANT CHANGE UP (ref 32–37)
MCV RBC AUTO: 90.9 FL — SIGNIFICANT CHANGE UP (ref 80–94)
MONOCYTES # BLD AUTO: 1.38 K/UL — HIGH (ref 0.1–0.6)
MONOCYTES NFR BLD AUTO: 13.5 % — HIGH (ref 1.7–9.3)
NEUTROPHILS # BLD AUTO: 7.11 K/UL — HIGH (ref 1.4–6.5)
NEUTROPHILS NFR BLD AUTO: 69.4 % — SIGNIFICANT CHANGE UP (ref 42.2–75.2)
NRBC # BLD: 0 /100 WBCS — SIGNIFICANT CHANGE UP (ref 0–0)
PLATELET # BLD AUTO: 148 K/UL — SIGNIFICANT CHANGE UP (ref 130–400)
POTASSIUM SERPL-MCNC: 4.6 MMOL/L — SIGNIFICANT CHANGE UP (ref 3.5–5)
POTASSIUM SERPL-SCNC: 4.6 MMOL/L — SIGNIFICANT CHANGE UP (ref 3.5–5)
PROT SERPL-MCNC: 5.1 G/DL — LOW (ref 6–8)
RBC # BLD: 2.74 M/UL — LOW (ref 4.7–6.1)
RBC # FLD: 11.8 % — SIGNIFICANT CHANGE UP (ref 11.5–14.5)
SODIUM SERPL-SCNC: 130 MMOL/L — LOW (ref 135–146)
WBC # BLD: 10.24 K/UL — SIGNIFICANT CHANGE UP (ref 4.8–10.8)
WBC # FLD AUTO: 10.24 K/UL — SIGNIFICANT CHANGE UP (ref 4.8–10.8)

## 2021-08-16 PROCEDURE — 71045 X-RAY EXAM CHEST 1 VIEW: CPT | Mod: 26

## 2021-08-16 PROCEDURE — 93010 ELECTROCARDIOGRAM REPORT: CPT

## 2021-08-16 PROCEDURE — 99232 SBSQ HOSP IP/OBS MODERATE 35: CPT

## 2021-08-16 PROCEDURE — 99233 SBSQ HOSP IP/OBS HIGH 50: CPT

## 2021-08-16 RX ORDER — POTASSIUM CHLORIDE 20 MEQ
20 PACKET (EA) ORAL ONCE
Refills: 0 | Status: COMPLETED | OUTPATIENT
Start: 2021-08-16 | End: 2021-08-16

## 2021-08-16 RX ORDER — METOPROLOL TARTRATE 50 MG
25 TABLET ORAL EVERY 6 HOURS
Refills: 0 | Status: DISCONTINUED | OUTPATIENT
Start: 2021-08-16 | End: 2021-08-18

## 2021-08-16 RX ORDER — FUROSEMIDE 40 MG
20 TABLET ORAL ONCE
Refills: 0 | Status: COMPLETED | OUTPATIENT
Start: 2021-08-16 | End: 2021-08-16

## 2021-08-16 RX ORDER — DILTIAZEM HCL 120 MG
10 CAPSULE, EXT RELEASE 24 HR ORAL ONCE
Refills: 0 | Status: COMPLETED | OUTPATIENT
Start: 2021-08-16 | End: 2021-08-16

## 2021-08-16 RX ORDER — METOPROLOL TARTRATE 50 MG
5 TABLET ORAL ONCE
Refills: 0 | Status: COMPLETED | OUTPATIENT
Start: 2021-08-16 | End: 2021-08-16

## 2021-08-16 RX ADMIN — TAMSULOSIN HYDROCHLORIDE 0.4 MILLIGRAM(S): 0.4 CAPSULE ORAL at 21:54

## 2021-08-16 RX ADMIN — Medication 20 MILLIEQUIVALENT(S): at 09:51

## 2021-08-16 RX ADMIN — Medication 5 MILLIGRAM(S): at 11:32

## 2021-08-16 RX ADMIN — Medication 12.5 MILLIGRAM(S): at 11:32

## 2021-08-16 RX ADMIN — ATORVASTATIN CALCIUM 40 MILLIGRAM(S): 80 TABLET, FILM COATED ORAL at 21:55

## 2021-08-16 RX ADMIN — APIXABAN 5 MILLIGRAM(S): 2.5 TABLET, FILM COATED ORAL at 05:24

## 2021-08-16 RX ADMIN — Medication 10 MILLIGRAM(S): at 12:44

## 2021-08-16 RX ADMIN — Medication 25 MILLIGRAM(S): at 19:33

## 2021-08-16 RX ADMIN — Medication 400 MILLIGRAM(S): at 22:30

## 2021-08-16 RX ADMIN — Medication 400 MILLIGRAM(S): at 22:00

## 2021-08-16 RX ADMIN — APIXABAN 5 MILLIGRAM(S): 2.5 TABLET, FILM COATED ORAL at 17:33

## 2021-08-16 RX ADMIN — AMIODARONE HYDROCHLORIDE 400 MILLIGRAM(S): 400 TABLET ORAL at 17:33

## 2021-08-16 RX ADMIN — Medication 12.5 MILLIGRAM(S): at 17:33

## 2021-08-16 RX ADMIN — Medication 400 MILLIGRAM(S): at 06:00

## 2021-08-16 RX ADMIN — Medication 10 MILLIGRAM(S): at 17:55

## 2021-08-16 RX ADMIN — Medication 20 MILLIGRAM(S): at 09:51

## 2021-08-16 RX ADMIN — Medication 81 MILLIGRAM(S): at 11:32

## 2021-08-16 RX ADMIN — Medication 400 MILLIGRAM(S): at 15:49

## 2021-08-16 RX ADMIN — Medication 400 MILLIGRAM(S): at 14:49

## 2021-08-16 RX ADMIN — Medication 12.5 MILLIGRAM(S): at 05:32

## 2021-08-16 RX ADMIN — Medication 400 MILLIGRAM(S): at 05:30

## 2021-08-16 RX ADMIN — PANTOPRAZOLE SODIUM 40 MILLIGRAM(S): 20 TABLET, DELAYED RELEASE ORAL at 07:57

## 2021-08-16 RX ADMIN — AMIODARONE HYDROCHLORIDE 400 MILLIGRAM(S): 400 TABLET ORAL at 05:24

## 2021-08-16 RX ADMIN — Medication 25 MILLIGRAM(S): at 23:20

## 2021-08-16 NOTE — PROGRESS NOTE ADULT - SUBJECTIVE AND OBJECTIVE BOX
OPERATIVE PROCEDURE(s): CABG x4               POD # 4                       SURGEON(s): ABHIJIT Smith  SUBJECTIVE ASSESSMENT:81yMale patient seen and examined at bedside.    Vital Signs Last 24 Hrs  T(F): 97.1 (16 Aug 2021 04:00), Max: 100.8 (15 Aug 2021 13:00)  HR: 56 (16 Aug 2021 07:00) (52 - 128)  BP: 116/59 (16 Aug 2021 05:00) (89/52 - 131/61)  BP(mean): 83 (16 Aug 2021 05:00) (66 - 88)  ABP: 117/42 (16 Aug 2021 07:00) (65/65 - 155/53)  ABP(mean): 63 (16 Aug 2021 07:00)  RR: 25 (16 Aug 2021 07:00) (8 - 34)  SpO2: 99% (16 Aug 2021 07:00) (87% - 100%)    I&O's Detail    15 Aug 2021 07:01  -  16 Aug 2021 07:00  --------------------------------------------------------  IN:    Amiodarone: 50.1 mL    Oral Fluid: 360 mL  Total IN: 410.1 mL    OUT:    Indwelling Catheter - Urethral (mL): 1870 mL    Voided (mL): 525 mL  Total OUT: 2395 mL        Net: I&O's Detail    14 Aug 2021 07:01  -  15 Aug 2021 07:00  --------------------------------------------------------  Total NET: 115.3 mL      15 Aug 2021 07:01  -  16 Aug 2021 07:00  --------------------------------------------------------  Total NET: -1984.9 mL        CAPILLARY BLOOD GLUCOSE      POCT Blood Glucose.: 122 mg/dL (16 Aug 2021 06:37)  POCT Blood Glucose.: 157 mg/dL (15 Aug 2021 21:42)  POCT Blood Glucose.: 139 mg/dL (15 Aug 2021 17:38)  POCT Blood Glucose.: 179 mg/dL (15 Aug 2021 11:28)    A1C with Estimated Average Glucose Result: 5.6 % (08-10-21 @ 07:56)      Physical Exam:  General: NAD; A&Ox3  Cardiac: S1/S2, RRR, no murmur, no rubs  Lungs: unlabored respirations, CTA b/l, no wheeze, no rales, no crackles  Abdomen: Soft/NT/ND; positive bowel sounds x 4  Sternum: Intact, no click, incision healing well with no drainage  Incisions: Incisions clean/dry/intact  Extremities: No edema b/l lower extremities; good capillary refill; no cyanosis; palpable 1+ pedal pulses b/l    Central Venous Catheter: Yes[]  No[] , If Yes indication: intravenous access       Day #  Vazquez Catheter: Yes  [] , No  [] , If yes indication: monitor strict i/o's                     Day #  OGT: Yes [] No [] ,    If Yes Placement:                                                   Day #  EPICARDIAL WIRES:  [] YES [] NO                                                            Day #  BOWEL MOVEMENT:  [] YES [] NO, If No, Timing since last BM Day #  CHEST TUBE(MS/Left/Right):  [] YES [] NO, If yes -  AIR LEAKS:  [] YES [] NO        LABS:                        9.0<L>  10.24 )-----------( 148      ( 16 Aug 2021 01:00 )             24.9<L>                        9.1<L>  9.13  )-----------( 106<L>    ( 15 Aug 2021 01:09 )             26.2<L>    08-16    130<L>  |  100  |  26<H>  ----------------------------<  116<H>  4.6   |  21  |  1.0  08-15    127<L>  |  97<L>  |  18  ----------------------------<  175<H>  4.2   |  20  |  0.9    Ca    8.0<L>      16 Aug 2021 01:00  Mg     2.3     08-16    TPro  5.1<L> [6.0 - 8.0]  /  Alb  3.2<L> [3.5 - 5.2]  /  TBili  0.7 [0.2 - 1.2]  /  DBili  x   /  AST  34 [0 - 41]  /  ALT  40 [0 - 41]  /  AlkPhos  79 [30 - 115]  08-16        ABG - ( 16 Aug 2021 02:40 )  pH: 7.45  /  pCO2: 35    /  pO2: 63    / HCO3: 24    / Base Excess: 0.2   /  SaO2: 94    /  LA: 0.8              RADIOLOGY & ADDITIONAL TESTS:  CXR:   EKG:  Allergies    No Known Allergies    Intolerances      MEDICATIONS  (STANDING):  acetaminophen  IVPB .. 1000 milliGRAM(s) IV Intermittent once  aMIOdarone    Tablet 400 milliGRAM(s) Oral every 12 hours  apixaban 5 milliGRAM(s) Oral every 12 hours  aspirin enteric coated 81 milliGRAM(s) Oral daily  atorvastatin Oral Tab/Cap - Peds 40 milliGRAM(s) Oral at bedtime  dextrose 40% Gel 15 Gram(s) Oral once  dextrose 5%. 1000 milliLiter(s) (50 mL/Hr) IV Continuous <Continuous>  dextrose 5%. 1000 milliLiter(s) (100 mL/Hr) IV Continuous <Continuous>  dextrose 50% Injectable 50 milliLiter(s) IV Push every 15 minutes  dextrose 50% Injectable 25 milliLiter(s) IV Push every 15 minutes  glucagon  Injectable 1 milliGRAM(s) IntraMuscular once  ibuprofen  Tablet. 400 milliGRAM(s) Oral every 8 hours  insulin lispro (ADMELOG) corrective regimen sliding scale   SubCutaneous three times a day before meals  meperidine     Injectable 25 milliGRAM(s) IV Push once  metoprolol tartrate 12.5 milliGRAM(s) Oral every 6 hours  pantoprazole    Tablet 40 milliGRAM(s) Oral before breakfast  senna 2 Tablet(s) Oral at bedtime  sodium chloride 0.9%. 1000 milliLiter(s) (20 mL/Hr) IV Continuous <Continuous>  tamsulosin 0.4 milliGRAM(s) Oral at bedtime    MEDICATIONS  (PRN):  acetaminophen   Tablet .. 650 milliGRAM(s) Oral every 6 hours PRN Temp greater or equal to 38C (100.4F), Mild Pain (1 - 3)  ketorolac   Injectable 15 milliGRAM(s) IV Push every 4 hours PRN Moderate Pain (4 - 6)  oxyCODONE    IR 10 milliGRAM(s) Oral every 4 hours PRN Severe Pain (7 - 10)  oxyCODONE    IR 5 milliGRAM(s) Oral every 6 hours PRN Moderate Pain (4 - 6)    Home Medications:  amLODIPine 10 mg oral tablet: 1 tab(s) orally once a day (08 Aug 2021 17:45)  atenolol 50 mg oral tablet: 1 tab(s) orally once a day (08 Aug 2021 17:45)  Eliquis 5 mg oral tablet: 1 tab(s) orally 2 times a day (08 Aug 2021 17:45)  Lipitor 40 mg oral tablet: 1 tab(s) orally once a day (08 Aug 2021 17:45)  Multaq 400 mg oral tablet: 1 tab(s) orally 2 times a day (08 Aug 2021 17:44)    Pharmacologic DVT Prophylaxis [] YES, [] NO: Contraindication:  SCD's: YES b/l    GI Prophylaxis:     Post-Op Beta-Blockers: [] Yes, [] No: contraindication:  Post-Op CCB: [] Yes, [] No: contraindication:  Post-Op Aspirin:  [] Yes, [] No: contraindication:  Post-Op Statin:  [] Yes, [] No: contraindication:    Ambulation/Activity Status:    Assessment/Plan:  81y Male status-post  - Case and plan discussed with CTU Intensivist and CT Surgeon - Dr. Valero/Luis/Marie  - Continue CTU supportive care and ongoing plan of care as per continuing CTU rounds.   - Continue DVT/GI prophylaxis  - Incentive Spirometry 10 times an hour  - Continue to advance physical activity as tolerated and continue PT/OT as directed  1. CAD: Continue ASA, statin, BB  2. HTN:   3. A. Fib:   4. COPD/Hypoxia:   5. DM/Glucose Control:     Social Service Disposition:     OPERATIVE PROCEDURE(s): CABG x4               POD # 4                       SURGEON(s): ABHIJIT Smith  SUBJECTIVE ASSESSMENT:81yMale patient seen and examined at bedside.    Vital Signs Last 24 Hrs  T(F): 97.1 (16 Aug 2021 04:00), Max: 100.8 (15 Aug 2021 13:00)  HR: 56 (16 Aug 2021 07:00) (52 - 128)  BP: 116/59 (16 Aug 2021 05:00) (89/52 - 131/61)  BP(mean): 83 (16 Aug 2021 05:00) (66 - 88)  ABP: 117/42 (16 Aug 2021 07:00) (65/65 - 155/53)  ABP(mean): 63 (16 Aug 2021 07:00)  RR: 25 (16 Aug 2021 07:00) (8 - 34)  SpO2: 99% (16 Aug 2021 07:00) (87% - 100%)    I&O's Detail    15 Aug 2021 07:01  -  16 Aug 2021 07:00  --------------------------------------------------------  IN:    Amiodarone: 50.1 mL    Oral Fluid: 360 mL  Total IN: 410.1 mL    OUT:    Indwelling Catheter - Urethral (mL): 1870 mL    Voided (mL): 525 mL  Total OUT: 2395 mL        Net: I&O's Detail    14 Aug 2021 07:01  -  15 Aug 2021 07:00  --------------------------------------------------------  Total NET: 115.3 mL      15 Aug 2021 07:01  -  16 Aug 2021 07:00  --------------------------------------------------------  Total NET: -1984.9 mL        CAPILLARY BLOOD GLUCOSE      POCT Blood Glucose.: 122 mg/dL (16 Aug 2021 06:37)  POCT Blood Glucose.: 157 mg/dL (15 Aug 2021 21:42)  POCT Blood Glucose.: 139 mg/dL (15 Aug 2021 17:38)  POCT Blood Glucose.: 179 mg/dL (15 Aug 2021 11:28)    A1C with Estimated Average Glucose Result: 5.6 % (08-10-21 @ 07:56)      Physical Exam:  General: NAD; A&Ox3  Cardiac: S1/S2, RRR, no murmur, no rubs  Lungs: unlabored respirations, CTA b/l, no wheeze, no rales, no crackles  Abdomen: Soft/NT/ND; positive bowel sounds x 4  Sternum: Intact, no click, incision healing well with no drainage  Incisions: Incisions clean/dry/intact  Extremities: No edema b/l lower extremities; good capillary refill; no cyanosis; palpable 1+ pedal pulses b/l    Central Venous Catheter: Yes[]  No[] , If Yes indication: intravenous access       Day #  Vazquez Catheter: Yes  [] , No  [] , If yes indication: monitor strict i/o's                     Day #  OGT: Yes [] No [] ,    If Yes Placement:                                                   Day #  EPICARDIAL WIRES:  [] YES [] NO                                                            Day #  BOWEL MOVEMENT:  [] YES [] NO, If No, Timing since last BM Day #  CHEST TUBE(MS/Left/Right):  [] YES [] NO, If yes -  AIR LEAKS:  [] YES [] NO        LABS:                        9.0<L>  10.24 )-----------( 148      ( 16 Aug 2021 01:00 )             24.9<L>                        9.1<L>  9.13  )-----------( 106<L>    ( 15 Aug 2021 01:09 )             26.2<L>    08-16    130<L>  |  100  |  26<H>  ----------------------------<  116<H>  4.6   |  21  |  1.0  08-15    127<L>  |  97<L>  |  18  ----------------------------<  175<H>  4.2   |  20  |  0.9    Ca    8.0<L>      16 Aug 2021 01:00  Mg     2.3     08-16    TPro  5.1<L> [6.0 - 8.0]  /  Alb  3.2<L> [3.5 - 5.2]  /  TBili  0.7 [0.2 - 1.2]  /  DBili  x   /  AST  34 [0 - 41]  /  ALT  40 [0 - 41]  /  AlkPhos  79 [30 - 115]  08-16        ABG - ( 16 Aug 2021 02:40 )  pH: 7.45  /  pCO2: 35    /  pO2: 63    / HCO3: 24    / Base Excess: 0.2   /  SaO2: 94    /  LA: 0.8              RADIOLOGY & ADDITIONAL TESTS:  CXR:   EKG:  Allergies    No Known Allergies    Intolerances      MEDICATIONS  (STANDING):  acetaminophen  IVPB .. 1000 milliGRAM(s) IV Intermittent once  aMIOdarone    Tablet 400 milliGRAM(s) Oral every 12 hours  apixaban 5 milliGRAM(s) Oral every 12 hours  aspirin enteric coated 81 milliGRAM(s) Oral daily  atorvastatin Oral Tab/Cap - Peds 40 milliGRAM(s) Oral at bedtime  dextrose 40% Gel 15 Gram(s) Oral once  dextrose 5%. 1000 milliLiter(s) (50 mL/Hr) IV Continuous <Continuous>  dextrose 5%. 1000 milliLiter(s) (100 mL/Hr) IV Continuous <Continuous>  dextrose 50% Injectable 50 milliLiter(s) IV Push every 15 minutes  dextrose 50% Injectable 25 milliLiter(s) IV Push every 15 minutes  glucagon  Injectable 1 milliGRAM(s) IntraMuscular once  ibuprofen  Tablet. 400 milliGRAM(s) Oral every 8 hours  insulin lispro (ADMELOG) corrective regimen sliding scale   SubCutaneous three times a day before meals  meperidine     Injectable 25 milliGRAM(s) IV Push once  metoprolol tartrate 12.5 milliGRAM(s) Oral every 6 hours  pantoprazole    Tablet 40 milliGRAM(s) Oral before breakfast  senna 2 Tablet(s) Oral at bedtime  sodium chloride 0.9%. 1000 milliLiter(s) (20 mL/Hr) IV Continuous <Continuous>  tamsulosin 0.4 milliGRAM(s) Oral at bedtime    MEDICATIONS  (PRN):  acetaminophen   Tablet .. 650 milliGRAM(s) Oral every 6 hours PRN Temp greater or equal to 38C (100.4F), Mild Pain (1 - 3)  ketorolac   Injectable 15 milliGRAM(s) IV Push every 4 hours PRN Moderate Pain (4 - 6)  oxyCODONE    IR 10 milliGRAM(s) Oral every 4 hours PRN Severe Pain (7 - 10)  oxyCODONE    IR 5 milliGRAM(s) Oral every 6 hours PRN Moderate Pain (4 - 6)    Home Medications:  amLODIPine 10 mg oral tablet: 1 tab(s) orally once a day (08 Aug 2021 17:45)  atenolol 50 mg oral tablet: 1 tab(s) orally once a day (08 Aug 2021 17:45)  Eliquis 5 mg oral tablet: 1 tab(s) orally 2 times a day (08 Aug 2021 17:45)  Lipitor 40 mg oral tablet: 1 tab(s) orally once a day (08 Aug 2021 17:45)  Multaq 400 mg oral tablet: 1 tab(s) orally 2 times a day (08 Aug 2021 17:44)    Pharmacologic DVT Prophylaxis [] YES, [] NO: Contraindication:  SCD's: YES b/l    GI Prophylaxis:     Post-Op Beta-Blockers: [] Yes, [] No: contraindication:  Post-Op CCB: [] Yes, [] No: contraindication:  Post-Op Aspirin:  [] Yes, [] No: contraindication:  Post-Op Statin:  [] Yes, [] No: contraindication:    Ambulation/Activity Status:    Assessment/Plan:  81y Male status-post  - Case and plan discussed with CTU Intensivist and CT Surgeon - Dr. Valero/Luis/Marie  - Continue CTU supportive care and ongoing plan of care as per continuing CTU rounds.   - Continue DVT/GI prophylaxis  - Incentive Spirometry 10 times an hour  - Continue to advance physical activity as tolerated and continue PT/OT as directed  1. CAD: Continue ASA, statin, BB  2. HTN:   3. A. Fib:   4. COPD/Hypoxia:   5. DM/Glucose Control:     Social Service Disposition:    d/c wires  lasix 20 iv  potassium 20po   OPERATIVE PROCEDURE(s): CABG x4               POD # 4                       SURGEON(s): ABHIJIT Smith  SUBJECTIVE ASSESSMENT:81yMale patient seen and examined at bedside with complaint of diarrhea and lower abdominal pain.    Vital Signs Last 24 Hrs  T(F): 97.1 (16 Aug 2021 04:00), Max: 100.8 (15 Aug 2021 13:00)  HR: 56 (16 Aug 2021 07:00) (52 - 128)  BP: 116/59 (16 Aug 2021 05:00) (89/52 - 131/61)  BP(mean): 83 (16 Aug 2021 05:00) (66 - 88)  ABP: 117/42 (16 Aug 2021 07:00) (65/65 - 155/53)  ABP(mean): 63 (16 Aug 2021 07:00)  RR: 25 (16 Aug 2021 07:00) (8 - 34)  SpO2: 99% (16 Aug 2021 07:00) (87% - 100%)    I&O's Detail    15 Aug 2021 07:01  -  16 Aug 2021 07:00  --------------------------------------------------------  IN:    Amiodarone: 50.1 mL    Oral Fluid: 360 mL  Total IN: 410.1 mL    OUT:    Indwelling Catheter - Urethral (mL): 1870 mL    Voided (mL): 525 mL  Total OUT: 2395 mL        Net: I&O's Detail    14 Aug 2021 07:01  -  15 Aug 2021 07:00  --------------------------------------------------------  Total NET: 115.3 mL      15 Aug 2021 07:01  -  16 Aug 2021 07:00  --------------------------------------------------------  Total NET: -1984.9 mL        CAPILLARY BLOOD GLUCOSE      POCT Blood Glucose.: 122 mg/dL (16 Aug 2021 06:37)  POCT Blood Glucose.: 157 mg/dL (15 Aug 2021 21:42)  POCT Blood Glucose.: 139 mg/dL (15 Aug 2021 17:38)  POCT Blood Glucose.: 179 mg/dL (15 Aug 2021 11:28)    A1C with Estimated Average Glucose Result: 5.6 % (08-10-21 @ 07:56)      Physical Exam:  General: NAD; A&Ox3  Cardiac: S1/S2, irregular radial pulse, no murmur, no rubs  Lungs: unlabored respirations, CTA b/l, no wheeze, no rales, no crackles  Abdomen: Soft/NT/ mild distention; positive bowel sounds x 4  Sternum: Intact, no click, incision healing well with no drainage  Incisions: Incisions clean/dry/intact  Extremities: pitting edema b/l lower extremities, Right +1 pretibial, Left +2 pretibial; good capillary refill; no cyanosis; pedal pulses b/l difficult to palpate, cool feet, no calf tenderness    Central Venous Catheter: Yes[x]  No[] , If Yes indication: intravenous access       Day #4  Vazquez Catheter: Yes  [] , No  [x] , If yes indication: monitor strict i/o's                     Day #  OGT: Yes [] No [x] ,    If Yes Placement:                                                   Day #  EPICARDIAL WIRES:  [x] YES [] NO                                                            Day #  BOWEL MOVEMENT:  [x] YES [] NO, If No, Timing since last BM Day #  CHEST TUBE(MS/Left/Right):  [] YES [x] NO, If yes -  AIR LEAKS:  [] YES [] NO        LABS:                        9.0<L>  10.24 )-----------( 148      ( 16 Aug 2021 01:00 )             24.9<L>                        9.1<L>  9.13  )-----------( 106<L>    ( 15 Aug 2021 01:09 )             26.2<L>    08-16    130<L>  |  100  |  26<H>  ----------------------------<  116<H>  4.6   |  21  |  1.0  08-15    127<L>  |  97<L>  |  18  ----------------------------<  175<H>  4.2   |  20  |  0.9    Ca    8.0<L>      16 Aug 2021 01:00  Mg     2.3     08-16    TPro  5.1<L> [6.0 - 8.0]  /  Alb  3.2<L> [3.5 - 5.2]  /  TBili  0.7 [0.2 - 1.2]  /  DBili  x   /  AST  34 [0 - 41]  /  ALT  40 [0 - 41]  /  AlkPhos  79 [30 - 115]  08-16        ABG - ( 16 Aug 2021 02:40 )  pH: 7.45  /  pCO2: 35    /  pO2: 63    / HCO3: 24    / Base Excess: 0.2   /  SaO2: 94    /  LA: 0.8              RADIOLOGY & ADDITIONAL TESTS:  CXR: (08/16/21) IMPRESSION: Stable cardiomediastinal silhouette. Unchanged right IJ venous catheter. Stable to slightly decreased bilateral pleural effusion/opacities are without significant change. No pneumothorax.  EKG: (08/16/21) Ventricular Rate 57 BPM    Atrial Rate 57 BPM    P-R Interval 156 ms    QRS Duration 102 ms    Q-T Interval 540 ms    QTC Calculation(Bazett) 525 ms    P Axis 49 degrees    R Axis -9 degrees    T Axis 14 degrees    Diagnosis Line Sinus bradycardia  Possible Left atrial enlargement  Nonspecific ST abnormality  Prolonged QT  Abnormal ECG    Allergies    No Known Allergies    Intolerances      MEDICATIONS  (STANDING):  acetaminophen  IVPB .. 1000 milliGRAM(s) IV Intermittent once  aMIOdarone    Tablet 400 milliGRAM(s) Oral every 12 hours  apixaban 5 milliGRAM(s) Oral every 12 hours  aspirin enteric coated 81 milliGRAM(s) Oral daily  atorvastatin Oral Tab/Cap - Peds 40 milliGRAM(s) Oral at bedtime  dextrose 40% Gel 15 Gram(s) Oral once  dextrose 5%. 1000 milliLiter(s) (50 mL/Hr) IV Continuous <Continuous>  dextrose 5%. 1000 milliLiter(s) (100 mL/Hr) IV Continuous <Continuous>  dextrose 50% Injectable 50 milliLiter(s) IV Push every 15 minutes  dextrose 50% Injectable 25 milliLiter(s) IV Push every 15 minutes  glucagon  Injectable 1 milliGRAM(s) IntraMuscular once  ibuprofen  Tablet. 400 milliGRAM(s) Oral every 8 hours  insulin lispro (ADMELOG) corrective regimen sliding scale   SubCutaneous three times a day before meals  meperidine     Injectable 25 milliGRAM(s) IV Push once  metoprolol tartrate 12.5 milliGRAM(s) Oral every 6 hours  pantoprazole    Tablet 40 milliGRAM(s) Oral before breakfast  senna 2 Tablet(s) Oral at bedtime  sodium chloride 0.9%. 1000 milliLiter(s) (20 mL/Hr) IV Continuous <Continuous>  tamsulosin 0.4 milliGRAM(s) Oral at bedtime    MEDICATIONS  (PRN):  acetaminophen   Tablet .. 650 milliGRAM(s) Oral every 6 hours PRN Temp greater or equal to 38C (100.4F), Mild Pain (1 - 3)  ketorolac   Injectable 15 milliGRAM(s) IV Push every 4 hours PRN Moderate Pain (4 - 6)  oxyCODONE    IR 10 milliGRAM(s) Oral every 4 hours PRN Severe Pain (7 - 10)  oxyCODONE    IR 5 milliGRAM(s) Oral every 6 hours PRN Moderate Pain (4 - 6)    Home Medications:  amLODIPine 10 mg oral tablet: 1 tab(s) orally once a day (08 Aug 2021 17:45)  atenolol 50 mg oral tablet: 1 tab(s) orally once a day (08 Aug 2021 17:45)  Eliquis 5 mg oral tablet: 1 tab(s) orally 2 times a day (08 Aug 2021 17:45)  Lipitor 40 mg oral tablet: 1 tab(s) orally once a day (08 Aug 2021 17:45)  Multaq 400 mg oral tablet: 1 tab(s) orally 2 times a day (08 Aug 2021 17:44)    Pharmacologic DVT Prophylaxis [x] YES, [] NO: Contraindication:  SCD's: YES b/l    GI Prophylaxis: pantoprazole     Post-Op Beta-Blockers: [x] Yes, [] No: contraindication:  Post-Op CCB: [] Yes, [x] No: contraindication:  Post-Op Aspirin:  [x] Yes, [] No: contraindication:  Post-Op Statin:  [x] Yes, [] No: contraindication:    Ambulation/Activity Status: ambulate as tolerated    Assessment/Plan:  81y Male status-post CABGx4 POD#4  - Case and plan discussed with CTU Intensivist and CT Surgeon - Dr. Smith  - Continue CTU supportive care and ongoing plan of care as per continuing CTU rounds.   - Continue DVT/GI prophylaxis  - Incentive Spirometry 10 times an hour  - Continue to advance physical activity as tolerated and continue PT/OT as directed  1. CAD: Continue ASA, statin, BB  2. HTN: start lasix 20mg IV  3. A. Fib: amiodarone 400mg q12h  5. DM/Glucose Control: Asdmelog sliding scale    Social Service Disposition:    d/c wires  lasix 20 iv  potassium 20po   OPERATIVE PROCEDURE(s): CABG x4               POD # 4                       SURGEON(s): ABHIJIT Smith  SUBJECTIVE ASSESSMENT:81yMale patient seen and examined at bedside with complaint of diarrhea and lower abdominal pain.    Vital Signs Last 24 Hrs  T(F): 97.1 (16 Aug 2021 04:00), Max: 100.8 (15 Aug 2021 13:00)  HR: 56 (16 Aug 2021 07:00) (52 - 128)  BP: 116/59 (16 Aug 2021 05:00) (89/52 - 131/61)  BP(mean): 83 (16 Aug 2021 05:00) (66 - 88)  ABP: 117/42 (16 Aug 2021 07:00) (65/65 - 155/53)  ABP(mean): 63 (16 Aug 2021 07:00)  RR: 25 (16 Aug 2021 07:00) (8 - 34)  SpO2: 99% (16 Aug 2021 07:00) (87% - 100%)    I&O's Detail    15 Aug 2021 07:01  -  16 Aug 2021 07:00  --------------------------------------------------------  IN:    Amiodarone: 50.1 mL    Oral Fluid: 360 mL  Total IN: 410.1 mL    OUT:    Indwelling Catheter - Urethral (mL): 1870 mL    Voided (mL): 525 mL  Total OUT: 2395 mL        Net: I&O's Detail    14 Aug 2021 07:01  -  15 Aug 2021 07:00  --------------------------------------------------------  Total NET: 115.3 mL      15 Aug 2021 07:01  -  16 Aug 2021 07:00  --------------------------------------------------------  Total NET: -1984.9 mL        CAPILLARY BLOOD GLUCOSE      POCT Blood Glucose.: 122 mg/dL (16 Aug 2021 06:37)  POCT Blood Glucose.: 157 mg/dL (15 Aug 2021 21:42)  POCT Blood Glucose.: 139 mg/dL (15 Aug 2021 17:38)  POCT Blood Glucose.: 179 mg/dL (15 Aug 2021 11:28)    A1C with Estimated Average Glucose Result: 5.6 % (08-10-21 @ 07:56)      Physical Exam:  General: NAD; A&Ox3  Cardiac: S1/S2, irregular radial pulse, no murmur, no rubs  Lungs: unlabored respirations, CTA b/l, no wheeze, no rales, no crackles  Abdomen: Soft/NT/ mild distention; positive bowel sounds x 4  Sternum: Intact, no click, incision healing well with no drainage  Incisions: Incisions clean/dry/intact  Extremities: pitting edema b/l lower extremities, Right +1 pretibial, Left +2 pretibial; good capillary refill; no cyanosis; pedal pulses b/l difficult to palpate, cool feet, no calf tenderness    Central Venous Catheter: Yes[x]  No[] , If Yes indication: intravenous access       Day #4  Vazquez Catheter: Yes  [] , No  [x] , If yes indication:                      Day #  OGT: Yes [] No [x] ,    If Yes Placement:                                                   Day #  EPICARDIAL WIRES:  [x] YES [] NO                                                            Day #4  BOWEL MOVEMENT:  [x] YES [] NO, If No, Timing since last BM Day #  CHEST TUBE(MS/Left/Right):  [] YES [x] NO, If yes -  AIR LEAKS:  [] YES [] NO        LABS:                        9.0<L>  10.24 )-----------( 148      ( 16 Aug 2021 01:00 )             24.9<L>                        9.1<L>  9.13  )-----------( 106<L>    ( 15 Aug 2021 01:09 )             26.2<L>    08-16    130<L>  |  100  |  26<H>  ----------------------------<  116<H>  4.6   |  21  |  1.0  08-15    127<L>  |  97<L>  |  18  ----------------------------<  175<H>  4.2   |  20  |  0.9    Ca    8.0<L>      16 Aug 2021 01:00  Mg     2.3     08-16    TPro  5.1<L> [6.0 - 8.0]  /  Alb  3.2<L> [3.5 - 5.2]  /  TBili  0.7 [0.2 - 1.2]  /  DBili  x   /  AST  34 [0 - 41]  /  ALT  40 [0 - 41]  /  AlkPhos  79 [30 - 115]  08-16        ABG - ( 16 Aug 2021 02:40 )  pH: 7.45  /  pCO2: 35    /  pO2: 63    / HCO3: 24    / Base Excess: 0.2   /  SaO2: 94    /  LA: 0.8              RADIOLOGY & ADDITIONAL TESTS:  CXR: (08/16/21) IMPRESSION: Stable cardiomediastinal silhouette. Unchanged right IJ venous catheter. Stable to slightly decreased bilateral pleural effusion/opacities are without significant change. No pneumothorax.  EKG: (08/16/21) Ventricular Rate 57 BPM    Atrial Rate 57 BPM    P-R Interval 156 ms    QRS Duration 102 ms    Q-T Interval 540 ms    QTC Calculation(Bazett) 525 ms    P Axis 49 degrees    R Axis -9 degrees    T Axis 14 degrees    Diagnosis Line Sinus bradycardia  Possible Left atrial enlargement  Nonspecific ST abnormality  Prolonged QT  Abnormal ECG    Allergies    No Known Allergies    Intolerances      MEDICATIONS  (STANDING):  acetaminophen  IVPB .. 1000 milliGRAM(s) IV Intermittent once  aMIOdarone    Tablet 400 milliGRAM(s) Oral every 12 hours  apixaban 5 milliGRAM(s) Oral every 12 hours  aspirin enteric coated 81 milliGRAM(s) Oral daily  atorvastatin Oral Tab/Cap - Peds 40 milliGRAM(s) Oral at bedtime  dextrose 40% Gel 15 Gram(s) Oral once  dextrose 5%. 1000 milliLiter(s) (50 mL/Hr) IV Continuous <Continuous>  dextrose 5%. 1000 milliLiter(s) (100 mL/Hr) IV Continuous <Continuous>  dextrose 50% Injectable 50 milliLiter(s) IV Push every 15 minutes  dextrose 50% Injectable 25 milliLiter(s) IV Push every 15 minutes  glucagon  Injectable 1 milliGRAM(s) IntraMuscular once  ibuprofen  Tablet. 400 milliGRAM(s) Oral every 8 hours  insulin lispro (ADMELOG) corrective regimen sliding scale   SubCutaneous three times a day before meals  meperidine     Injectable 25 milliGRAM(s) IV Push once  metoprolol tartrate 12.5 milliGRAM(s) Oral every 6 hours  pantoprazole    Tablet 40 milliGRAM(s) Oral before breakfast  senna 2 Tablet(s) Oral at bedtime  sodium chloride 0.9%. 1000 milliLiter(s) (20 mL/Hr) IV Continuous <Continuous>  tamsulosin 0.4 milliGRAM(s) Oral at bedtime    MEDICATIONS  (PRN):  acetaminophen   Tablet .. 650 milliGRAM(s) Oral every 6 hours PRN Temp greater or equal to 38C (100.4F), Mild Pain (1 - 3)  ketorolac   Injectable 15 milliGRAM(s) IV Push every 4 hours PRN Moderate Pain (4 - 6)  oxyCODONE    IR 10 milliGRAM(s) Oral every 4 hours PRN Severe Pain (7 - 10)  oxyCODONE    IR 5 milliGRAM(s) Oral every 6 hours PRN Moderate Pain (4 - 6)    Home Medications:  amLODIPine 10 mg oral tablet: 1 tab(s) orally once a day (08 Aug 2021 17:45)  atenolol 50 mg oral tablet: 1 tab(s) orally once a day (08 Aug 2021 17:45)  Eliquis 5 mg oral tablet: 1 tab(s) orally 2 times a day (08 Aug 2021 17:45)  Lipitor 40 mg oral tablet: 1 tab(s) orally once a day (08 Aug 2021 17:45)  Multaq 400 mg oral tablet: 1 tab(s) orally 2 times a day (08 Aug 2021 17:44)    Pharmacologic DVT Prophylaxis [x] YES, [] NO: Contraindication:  SCD's: YES b/l    GI Prophylaxis: pantoprazole     Post-Op Beta-Blockers: [x] Yes, [] No: contraindication:  Post-Op Aspirin:  [x] Yes, [] No: contraindication:  Post-Op Statin:  [x] Yes, [] No: contraindication:    Ambulation/Activity Status: ambulate as tolerated    Assessment/Plan:  81y Male status-post CABGx4 POD#4  - Case and plan discussed with CTU Intensivist and CT Surgeon - Dr. Smith  - Continue CTU supportive care and ongoing plan of care as per continuing CTU rounds.   - Continue DVT/GI prophylaxis  - Incentive Spirometry 10 times an hour  - Continue to advance physical activity as tolerated and continue PT/OT as directed  1. CAD: Continue ASA, statin, BB  2. HTN: metoprolol  3. A. Fib: amiodarone, eliquis  4. CDOPD/Hypoxia: cont nebs, mucinex wean o2 as tolerated, encourage incentive spirometry, lasix for fluid overload   5. DM/Glucose Control: sliding scale    Social Service Disposition: home

## 2021-08-16 NOTE — PROGRESS NOTE ADULT - ASSESSMENT
Assessment/Plan:  CAD-s/p CABG x 4-POD #4  1-BP control-beta-blockers  2-serum glucose control-insulin sliding scale correction regimen  3-fluid overload-diuresis  4-S-Fdb-continue amiodarone, metoprolol, apixaban  5-acute blood loss anemia/thrombocytopenia-stable, monitor Hb/Hct/plts daily  5-NKF-vwkkczuv Flomax

## 2021-08-16 NOTE — PROGRESS NOTE ADULT - SUBJECTIVE AND OBJECTIVE BOX
Patient is a 81y old  Male who presents with a chief complaint of Chest pain (16 Aug 2021 07:53)          SUBJ:  Patient seen and examined.      MEDICATIONS  (STANDING):  acetaminophen  IVPB .. 1000 milliGRAM(s) IV Intermittent once  aMIOdarone    Tablet 400 milliGRAM(s) Oral every 12 hours  apixaban 5 milliGRAM(s) Oral every 12 hours  aspirin enteric coated 81 milliGRAM(s) Oral daily  atorvastatin Oral Tab/Cap - Peds 40 milliGRAM(s) Oral at bedtime  dextrose 40% Gel 15 Gram(s) Oral once  dextrose 5%. 1000 milliLiter(s) (50 mL/Hr) IV Continuous <Continuous>  dextrose 5%. 1000 milliLiter(s) (100 mL/Hr) IV Continuous <Continuous>  dextrose 50% Injectable 50 milliLiter(s) IV Push every 15 minutes  dextrose 50% Injectable 25 milliLiter(s) IV Push every 15 minutes  glucagon  Injectable 1 milliGRAM(s) IntraMuscular once  ibuprofen  Tablet. 400 milliGRAM(s) Oral every 8 hours  insulin lispro (ADMELOG) corrective regimen sliding scale   SubCutaneous three times a day before meals  metoprolol tartrate 12.5 milliGRAM(s) Oral every 6 hours  pantoprazole    Tablet 40 milliGRAM(s) Oral before breakfast  sodium chloride 0.9%. 1000 milliLiter(s) (20 mL/Hr) IV Continuous <Continuous>  tamsulosin 0.4 milliGRAM(s) Oral at bedtime    MEDICATIONS  (PRN):  acetaminophen   Tablet .. 650 milliGRAM(s) Oral every 6 hours PRN Temp greater or equal to 38C (100.4F), Mild Pain (1 - 3)  ketorolac   Injectable 15 milliGRAM(s) IV Push every 4 hours PRN Moderate Pain (4 - 6)  oxyCODONE    IR 10 milliGRAM(s) Oral every 4 hours PRN Severe Pain (7 - 10)  oxyCODONE    IR 5 milliGRAM(s) Oral every 6 hours PRN Moderate Pain (4 - 6)            Vital Signs Last 24 Hrs  T(C): 36.6 (16 Aug 2021 08:00), Max: 38.2 (15 Aug 2021 13:00)  T(F): 97.8 (16 Aug 2021 08:00), Max: 100.8 (15 Aug 2021 13:00)  HR: 66 (16 Aug 2021 08:00) (52 - 118)  BP: 116/59 (16 Aug 2021 05:00) (89/52 - 131/61)  BP(mean): 83 (16 Aug 2021 05:00) (66 - 88)  RR: 29 (16 Aug 2021 08:00) (10 - 34)  SpO2: 99% (16 Aug 2021 08:00) (94% - 100%)      PHYSICAL EXAM:    GEN: NAD  HEENT: NC/AT  Neck: No JVD  CV: Reg, S1-S2  Ext: No edema        08-15-21 @ 07:01  -  08-16-21 @ 07:00  --------------------------------------------------------  IN: 410.1 mL / OUT: 2395 mL / NET: -1984.9 mL        I&O's Summary    15 Aug 2021 07:01  -  16 Aug 2021 07:00  --------------------------------------------------------  IN: 410.1 mL / OUT: 2395 mL / NET: -1984.9 mL        LABS:                        9.0    10.24 )-----------( 148      ( 16 Aug 2021 01:00 )             24.9     08-16    130<L>  |  100  |  26<H>  ----------------------------<  116<H>  4.6   |  21  |  1.0    Ca    8.0<L>      16 Aug 2021 01:00  Mg     2.3     08-16    TPro  5.1<L>  /  Alb  3.2<L>  /  TBili  0.7  /  DBili  x   /  AST  34  /  ALT  40  /  AlkPhos  79  08-16              BNP  RADIOLOGY & ADDITIONAL STUDIES:      IMPRESSION AND PLAN:

## 2021-08-16 NOTE — PROGRESS NOTE ADULT - ASSESSMENT
S/p CABG for severe CAD  Recovering well  PAF      C/w medical therapy  glycemic control  C/w ASA, Eliquis  Statin  C/w BB  Add ACE-I or ARB if tolerated  post-op care as per CTS/CCM

## 2021-08-16 NOTE — PROGRESS NOTE ADULT - SUBJECTIVE AND OBJECTIVE BOX
CTU Attending Progress Daily Note     16 Aug 2021 11:08  POD# - 4  He has history of CAD (coronary artery disease)    HLD (hyperlipidemia)    Hypertension      Interval event for past 24 hr:  RADHA BAKER  81y had no event.   Current Complains:  RADHA BAKER has no new complains  HPI:  Patient is a 80 yo M hx of CAD s/p PCI x 3 last 2010, Afib on Eliquis and HLD presented with cc of chest pain. Chest pain started  last night around 11pm, described as heavy pressure, 6/10, with radiation to left arm associated with Palpitations. Patient follows with a cardiologist in [place] and was told if this occurs he should take an extra dose of his medications so around 2am he took an extra dose of Multaq (Extra 400mg) and atenolol(extra 50mg)  and took his morning dose. However, he came to the ED today because his is still having the CP and palpitations. He also noticed that he has CP with exertion which is new for him. He normally walks a lot but has needed to stop to rest while walking up the stairs due to CP. No LE swelling. No nausea, vomiting, abdominal pain, fevers or chills.    IN ED: Pt was hypotensive to 90's with improvement to 100's  initially 90s, bradycardic to  HR 50's   Labs: Trop: 0.04 x 2, EKG: Afib, no ST changes      (08 Aug 2021 17:18)    OBJECTIVE:  ICU Vital Signs Last 24 Hrs  T(C): 36.6 (16 Aug 2021 08:00), Max: 38.2 (15 Aug 2021 13:00)  T(F): 97.8 (16 Aug 2021 08:00), Max: 100.8 (15 Aug 2021 13:00)  HR: 137 (16 Aug 2021 11:00) (52 - 137)  BP: 148/80 (16 Aug 2021 11:00) (89/52 - 148/80)  BP(mean): 101 (16 Aug 2021 11:00) (66 - 101)  ABP: 141/53 (16 Aug 2021 11:00) (65/65 - 159/48)  ABP(mean): 75 (16 Aug 2021 11:00) (50 - 77)  RR: 28 (16 Aug 2021 11:00) (15 - 34)  SpO2: 93% (16 Aug 2021 11:00) (93% - 100%)    I&O's Summary    15 Aug 2021 07:01  -  16 Aug 2021 07:00  --------------------------------------------------------  IN: 410.1 mL / OUT: 2395 mL / NET: -1984.9 mL      I&O's Detail    15 Aug 2021 07:01  -  16 Aug 2021 07:00  --------------------------------------------------------  IN:    Amiodarone: 50.1 mL    Oral Fluid: 360 mL  Total IN: 410.1 mL    OUT:    Indwelling Catheter - Urethral (mL): 1870 mL    Voided (mL): 525 mL  Total OUT: 2395 mL    Total NET: -1984.9 mL            CAPILLARY BLOOD GLUCOSE      POCT Blood Glucose.: 122 mg/dL (16 Aug 2021 06:37)  POCT Blood Glucose.: 157 mg/dL (15 Aug 2021 21:42)  POCT Blood Glucose.: 139 mg/dL (15 Aug 2021 17:38)  POCT Blood Glucose.: 179 mg/dL (15 Aug 2021 11:28)    LABS:  ABG - ( 16 Aug 2021 02:40 )  pH, Arterial: 7.45  pH, Blood: x     /  pCO2: 35    /  pO2: 63    / HCO3: 24    / Base Excess: 0.2   /  SaO2: 94                                      9.0    10.24 )-----------( 148      ( 16 Aug 2021 01:00 )             24.9     08-16    130<L>  |  100  |  26<H>  ----------------------------<  116<H>  4.6   |  21  |  1.0    Ca    8.0<L>      16 Aug 2021 01:00  Mg     2.3     08-16    TPro  5.1<L>  /  Alb  3.2<L>  /  TBili  0.7  /  DBili  x   /  AST  34  /  ALT  40  /  AlkPhos  79  08-16          Home Medications:  amLODIPine 10 mg oral tablet: 1 tab(s) orally once a day (08 Aug 2021 17:45)  atenolol 50 mg oral tablet: 1 tab(s) orally once a day (08 Aug 2021 17:45)  Eliquis 5 mg oral tablet: 1 tab(s) orally 2 times a day (08 Aug 2021 17:45)  Lipitor 40 mg oral tablet: 1 tab(s) orally once a day (08 Aug 2021 17:45)  Multaq 400 mg oral tablet: 1 tab(s) orally 2 times a day (08 Aug 2021 17:44)    HOSPITAL MEDICATIONS:  MEDICATIONS  (STANDING):  acetaminophen  IVPB .. 1000 milliGRAM(s) IV Intermittent once  aMIOdarone    Tablet 400 milliGRAM(s) Oral every 12 hours  apixaban 5 milliGRAM(s) Oral every 12 hours  aspirin enteric coated 81 milliGRAM(s) Oral daily  atorvastatin Oral Tab/Cap - Peds 40 milliGRAM(s) Oral at bedtime  dextrose 40% Gel 15 Gram(s) Oral once  dextrose 5%. 1000 milliLiter(s) (50 mL/Hr) IV Continuous <Continuous>  dextrose 5%. 1000 milliLiter(s) (100 mL/Hr) IV Continuous <Continuous>  dextrose 50% Injectable 50 milliLiter(s) IV Push every 15 minutes  dextrose 50% Injectable 25 milliLiter(s) IV Push every 15 minutes  glucagon  Injectable 1 milliGRAM(s) IntraMuscular once  ibuprofen  Tablet. 400 milliGRAM(s) Oral every 8 hours  insulin lispro (ADMELOG) corrective regimen sliding scale   SubCutaneous three times a day before meals  metoprolol tartrate 12.5 milliGRAM(s) Oral every 6 hours  pantoprazole    Tablet 40 milliGRAM(s) Oral before breakfast  sodium chloride 0.9%. 1000 milliLiter(s) (20 mL/Hr) IV Continuous <Continuous>  tamsulosin 0.4 milliGRAM(s) Oral at bedtime    MEDICATIONS  (PRN):  acetaminophen   Tablet .. 650 milliGRAM(s) Oral every 6 hours PRN Temp greater or equal to 38C (100.4F), Mild Pain (1 - 3)  ketorolac   Injectable 15 milliGRAM(s) IV Push every 4 hours PRN Moderate Pain (4 - 6)  oxyCODONE    IR 10 milliGRAM(s) Oral every 4 hours PRN Severe Pain (7 - 10)  oxyCODONE    IR 5 milliGRAM(s) Oral every 6 hours PRN Moderate Pain (4 - 6)      REVIEW OF SYSTEMS:  CONSTITUTIONAL: [X] all negative; [ ] weakness, [ ] fevers, [ ] chills  EYES/ENT: [X] all negative; [ ] visual changes, [ ] vertigo, [ ] throat pain   NECK: [X] all negative; [ ] pain, [ ] stiffness  RESPIRATORY: [] all negative, [ ] cough, [ ] wheezing, [ ] hemoptysis, [ ] shortness of breath  CARDIOVASCULAR: [] all negative; [ ] chest pain, [ ] palpitations, [ ] orthopnea  GASTROINTESTINAL: [X] all negative; [ ]abdominal pain, [ ] nausea, [ ] vomiting, [ ] hematemesis, [ ] diarrhea, [ ] constipation, [ ] melena, [ ] hematochezia.  GENITOURINARY: [X] all negative; [ ] dysuria, [ ] frequency, [ ] hematuria  NEUROLOGICAL: [X] all negative; [ ] numbness, [ ] weakness  SKIN: [X] all negative; [ ] itching, [ ] burning, [ ] rashes, [ ] lesions   All other review of systems is negative unless indicated above.    [  ] Unable to assess ROS because     PHYSICAL EXAM:          CONSTITUTIONAL: Well-developed; well-nourished; in no acute distress.   	SKIN: warm, dry  	HEAD: Normocephalic; atraumatic.  	EYES: PERRL, EOM, no conj injection, sclera clear  	ENT: No nasal discharge; airway clear.  	NECK: Supple; non tender.  No midline ttp ctls  	CARD: S1, S2 normal; no murmurs, gallops, or rubs. Regular rate and rhythm. 2+ RPs and DPs bilat, no carotid bruits, no pedal   edema, no calf pain b/l  	RESP: CTA  bilat good air movement No wheezes, rales or rhonchi.  	ABD: Soft, not tender, not distended, no CVA ttp no rebound or guarding, bowel sounds present  	EXT: Normal ROM.  No clubbing, cyanosis or edema.   	  	NEURO: Alert, awake, motor 5/5 R, 5/5 L        RADIOLOGY:  xray  < from: Xray Chest 1 View- PORTABLE-Routine (Xray Chest 1 View- PORTABLE-Routine in AM.) (08.16.21 @ 06:52) >    FINDINGS/  IMPRESSION:    Stable cardiomediastinal silhouette. Unchanged right IJ venous catheter. Stable to slightly decreased bilateral pleural effusion/opacities are without significant change. No pneumothorax.    --- End of Report ---        < end of copied text >    I spent 45 minutes of critical care time examining patient, reviewing vitals, labs, medications, imaging and discussing with the team goals of care to prevent life-threatening in this patient who is at high risk for deterioration or death due to:

## 2021-08-17 LAB
ALBUMIN SERPL ELPH-MCNC: 3.2 G/DL — LOW (ref 3.5–5.2)
ALP SERPL-CCNC: 90 U/L — SIGNIFICANT CHANGE UP (ref 30–115)
ALT FLD-CCNC: 54 U/L — HIGH (ref 0–41)
ANION GAP SERPL CALC-SCNC: 11 MMOL/L — SIGNIFICANT CHANGE UP (ref 7–14)
AST SERPL-CCNC: 43 U/L — HIGH (ref 0–41)
BASOPHILS # BLD AUTO: 0.03 K/UL — SIGNIFICANT CHANGE UP (ref 0–0.2)
BASOPHILS NFR BLD AUTO: 0.4 % — SIGNIFICANT CHANGE UP (ref 0–1)
BILIRUB SERPL-MCNC: 0.7 MG/DL — SIGNIFICANT CHANGE UP (ref 0.2–1.2)
BUN SERPL-MCNC: 26 MG/DL — HIGH (ref 10–20)
CALCIUM SERPL-MCNC: 8.3 MG/DL — LOW (ref 8.5–10.1)
CHLORIDE SERPL-SCNC: 101 MMOL/L — SIGNIFICANT CHANGE UP (ref 98–110)
CO2 SERPL-SCNC: 22 MMOL/L — SIGNIFICANT CHANGE UP (ref 17–32)
CREAT SERPL-MCNC: 1 MG/DL — SIGNIFICANT CHANGE UP (ref 0.7–1.5)
EOSINOPHIL # BLD AUTO: 0.13 K/UL — SIGNIFICANT CHANGE UP (ref 0–0.7)
EOSINOPHIL NFR BLD AUTO: 1.7 % — SIGNIFICANT CHANGE UP (ref 0–8)
GLUCOSE BLDC GLUCOMTR-MCNC: 123 MG/DL — HIGH (ref 70–99)
GLUCOSE BLDC GLUCOMTR-MCNC: 126 MG/DL — HIGH (ref 70–99)
GLUCOSE BLDC GLUCOMTR-MCNC: 133 MG/DL — HIGH (ref 70–99)
GLUCOSE BLDC GLUCOMTR-MCNC: 164 MG/DL — HIGH (ref 70–99)
GLUCOSE SERPL-MCNC: 112 MG/DL — HIGH (ref 70–99)
HCT VFR BLD CALC: 27.1 % — LOW (ref 42–52)
HGB BLD-MCNC: 9.3 G/DL — LOW (ref 14–18)
IMM GRANULOCYTES NFR BLD AUTO: 1.9 % — HIGH (ref 0.1–0.3)
LYMPHOCYTES # BLD AUTO: 1.26 K/UL — SIGNIFICANT CHANGE UP (ref 1.2–3.4)
LYMPHOCYTES # BLD AUTO: 16.7 % — LOW (ref 20.5–51.1)
MAGNESIUM SERPL-MCNC: 2 MG/DL — SIGNIFICANT CHANGE UP (ref 1.8–2.4)
MCHC RBC-ENTMCNC: 31.3 PG — HIGH (ref 27–31)
MCHC RBC-ENTMCNC: 34.3 G/DL — SIGNIFICANT CHANGE UP (ref 32–37)
MCV RBC AUTO: 91.2 FL — SIGNIFICANT CHANGE UP (ref 80–94)
MONOCYTES # BLD AUTO: 1.16 K/UL — HIGH (ref 0.1–0.6)
MONOCYTES NFR BLD AUTO: 15.4 % — HIGH (ref 1.7–9.3)
NEUTROPHILS # BLD AUTO: 4.81 K/UL — SIGNIFICANT CHANGE UP (ref 1.4–6.5)
NEUTROPHILS NFR BLD AUTO: 63.9 % — SIGNIFICANT CHANGE UP (ref 42.2–75.2)
NRBC # BLD: 0 /100 WBCS — SIGNIFICANT CHANGE UP (ref 0–0)
PLATELET # BLD AUTO: 180 K/UL — SIGNIFICANT CHANGE UP (ref 130–400)
POTASSIUM SERPL-MCNC: 4.6 MMOL/L — SIGNIFICANT CHANGE UP (ref 3.5–5)
POTASSIUM SERPL-SCNC: 4.6 MMOL/L — SIGNIFICANT CHANGE UP (ref 3.5–5)
PROT SERPL-MCNC: 5 G/DL — LOW (ref 6–8)
RBC # BLD: 2.97 M/UL — LOW (ref 4.7–6.1)
RBC # FLD: 11.8 % — SIGNIFICANT CHANGE UP (ref 11.5–14.5)
SODIUM SERPL-SCNC: 134 MMOL/L — LOW (ref 135–146)
WBC # BLD: 7.53 K/UL — SIGNIFICANT CHANGE UP (ref 4.8–10.8)
WBC # FLD AUTO: 7.53 K/UL — SIGNIFICANT CHANGE UP (ref 4.8–10.8)

## 2021-08-17 PROCEDURE — 99232 SBSQ HOSP IP/OBS MODERATE 35: CPT

## 2021-08-17 PROCEDURE — 71045 X-RAY EXAM CHEST 1 VIEW: CPT | Mod: 26

## 2021-08-17 PROCEDURE — 93010 ELECTROCARDIOGRAM REPORT: CPT

## 2021-08-17 PROCEDURE — 99233 SBSQ HOSP IP/OBS HIGH 50: CPT

## 2021-08-17 RX ORDER — POTASSIUM CHLORIDE 20 MEQ
20 PACKET (EA) ORAL ONCE
Refills: 0 | Status: COMPLETED | OUTPATIENT
Start: 2021-08-17 | End: 2021-08-17

## 2021-08-17 RX ORDER — CHLORHEXIDINE GLUCONATE 213 G/1000ML
1 SOLUTION TOPICAL
Refills: 0 | Status: DISCONTINUED | OUTPATIENT
Start: 2021-08-17 | End: 2021-08-19

## 2021-08-17 RX ORDER — FUROSEMIDE 40 MG
40 TABLET ORAL ONCE
Refills: 0 | Status: COMPLETED | OUTPATIENT
Start: 2021-08-17 | End: 2021-08-17

## 2021-08-17 RX ADMIN — Medication 400 MILLIGRAM(S): at 05:00

## 2021-08-17 RX ADMIN — Medication 20 MILLIEQUIVALENT(S): at 12:00

## 2021-08-17 RX ADMIN — AMIODARONE HYDROCHLORIDE 400 MILLIGRAM(S): 400 TABLET ORAL at 05:07

## 2021-08-17 RX ADMIN — PANTOPRAZOLE SODIUM 40 MILLIGRAM(S): 20 TABLET, DELAYED RELEASE ORAL at 06:13

## 2021-08-17 RX ADMIN — Medication 25 MILLIGRAM(S): at 12:04

## 2021-08-17 RX ADMIN — Medication 25 MILLIGRAM(S): at 17:09

## 2021-08-17 RX ADMIN — APIXABAN 5 MILLIGRAM(S): 2.5 TABLET, FILM COATED ORAL at 06:13

## 2021-08-17 RX ADMIN — ATORVASTATIN CALCIUM 40 MILLIGRAM(S): 80 TABLET, FILM COATED ORAL at 21:56

## 2021-08-17 RX ADMIN — Medication 2: at 12:00

## 2021-08-17 RX ADMIN — Medication 81 MILLIGRAM(S): at 12:04

## 2021-08-17 RX ADMIN — AMIODARONE HYDROCHLORIDE 400 MILLIGRAM(S): 400 TABLET ORAL at 17:09

## 2021-08-17 RX ADMIN — APIXABAN 5 MILLIGRAM(S): 2.5 TABLET, FILM COATED ORAL at 17:09

## 2021-08-17 RX ADMIN — TAMSULOSIN HYDROCHLORIDE 0.4 MILLIGRAM(S): 0.4 CAPSULE ORAL at 21:56

## 2021-08-17 RX ADMIN — Medication 25 MILLIGRAM(S): at 23:38

## 2021-08-17 RX ADMIN — Medication 25 MILLIGRAM(S): at 05:06

## 2021-08-17 RX ADMIN — Medication 40 MILLIGRAM(S): at 12:00

## 2021-08-17 RX ADMIN — CHLORHEXIDINE GLUCONATE 1 APPLICATION(S): 213 SOLUTION TOPICAL at 21:56

## 2021-08-17 NOTE — PROGRESS NOTE ADULT - ASSESSMENT
Assessment/Plan:  CAD-s/p CABG x 4-POD #5  1-BP control-beta-blockers  2-serum glucose control-insulin sliding scale correction regimen  3-fluid overload-diuresis  4-N-Zhi-continue amiodarone, metoprolol, apixaban now in RSR  5-acute blood loss anemia/thrombocytopenia-stable, monitor Hb/Hct/plts daily  4-IBQ-ycctsezf Flomax    spoke with Dr dotson can be discharged on amio and later he ll swutch to amio after 3 m as outpatient

## 2021-08-17 NOTE — PROGRESS NOTE ADULT - SUBJECTIVE AND OBJECTIVE BOX
CTU Attending Progress Daily Note     17 Aug 2021 13:28  POD# -5  He has history of CAD (coronary artery disease)    HLD (hyperlipidemia)    Hypertension      Interval event for past 24 hr:  RADHA BAKER  81y had no event.   Current Complains:  RADHA BAKER has no new complains  HPI:  Patient is a 82 yo M hx of CAD s/p PCI x 3 last 2010, Afib on Eliquis and HLD presented with cc of chest pain. Chest pain started  last night around 11pm, described as heavy pressure, 6/10, with radiation to left arm associated with Palpitations. Patient follows with a cardiologist in [place] and was told if this occurs he should take an extra dose of his medications so around 2am he took an extra dose of Multaq (Extra 400mg) and atenolol(extra 50mg)  and took his morning dose. However, he came to the ED today because his is still having the CP and palpitations. He also noticed that he has CP with exertion which is new for him. He normally walks a lot but has needed to stop to rest while walking up the stairs due to CP. No LE swelling. No nausea, vomiting, abdominal pain, fevers or chills.    IN ED: Pt was hypotensive to 90's with improvement to 100's  initially 90s, bradycardic to  HR 50's   Labs: Trop: 0.04 x 2, EKG: Afib, no ST changes      (08 Aug 2021 17:18)    OBJECTIVE:  ICU Vital Signs Last 24 Hrs  T(C): 36.1 (17 Aug 2021 12:00), Max: 37.1 (16 Aug 2021 16:00)  T(F): 97 (17 Aug 2021 12:00), Max: 98.7 (16 Aug 2021 16:00)  HR: 61 (17 Aug 2021 12:00) (54 - 125)  BP: 124/60 (17 Aug 2021 12:00) (94/58 - 156/72)  BP(mean): 86 (17 Aug 2021 12:00) (70 - 105)  ABP: --  ABP(mean): --  RR: 19 (17 Aug 2021 12:00) (10 - 65)  SpO2: 99% (17 Aug 2021 12:00) (98% - 100%)    I&O's Summary    16 Aug 2021 07:01  -  17 Aug 2021 07:00  --------------------------------------------------------  IN: 600 mL / OUT: 450 mL / NET: 150 mL    17 Aug 2021 07:01  -  17 Aug 2021 13:28  --------------------------------------------------------  IN: 600 mL / OUT: 625 mL / NET: -25 mL      I&O's Detail    16 Aug 2021 07:01  -  17 Aug 2021 07:00  --------------------------------------------------------  IN:    Oral Fluid: 600 mL  Total IN: 600 mL    OUT:    Voided (mL): 450 mL  Total OUT: 450 mL    Total NET: 150 mL      17 Aug 2021 07:01  -  17 Aug 2021 13:28  --------------------------------------------------------  IN:    Oral Fluid: 600 mL  Total IN: 600 mL    OUT:    Voided (mL): 625 mL  Total OUT: 625 mL    Total NET: -25 mL            CAPILLARY BLOOD GLUCOSE      POCT Blood Glucose.: 164 mg/dL (17 Aug 2021 11:20)  POCT Blood Glucose.: 126 mg/dL (17 Aug 2021 06:40)  POCT Blood Glucose.: 122 mg/dL (16 Aug 2021 22:08)  POCT Blood Glucose.: 128 mg/dL (16 Aug 2021 16:19)    LABS:  ABG - ( 16 Aug 2021 02:40 )  pH, Arterial: 7.45  pH, Blood: x     /  pCO2: 35    /  pO2: 63    / HCO3: 24    / Base Excess: 0.2   /  SaO2: 94                                      9.3    7.53  )-----------( 180      ( 17 Aug 2021 02:50 )             27.1     08-17    134<L>  |  101  |  26<H>  ----------------------------<  112<H>  4.6   |  22  |  1.0    Ca    8.3<L>      17 Aug 2021 02:50  Mg     2.0     08-17    TPro  5.0<L>  /  Alb  3.2<L>  /  TBili  0.7  /  DBili  x   /  AST  43<H>  /  ALT  54<H>  /  AlkPhos  90  08-17          Home Medications:  amLODIPine 10 mg oral tablet: 1 tab(s) orally once a day (08 Aug 2021 17:45)  atenolol 50 mg oral tablet: 1 tab(s) orally once a day (08 Aug 2021 17:45)  Eliquis 5 mg oral tablet: 1 tab(s) orally 2 times a day (08 Aug 2021 17:45)  Lipitor 40 mg oral tablet: 1 tab(s) orally once a day (08 Aug 2021 17:45)  Multaq 400 mg oral tablet: 1 tab(s) orally 2 times a day (08 Aug 2021 17:44)    HOSPITAL MEDICATIONS:  MEDICATIONS  (STANDING):  acetaminophen  IVPB .. 1000 milliGRAM(s) IV Intermittent once  aMIOdarone    Tablet 400 milliGRAM(s) Oral every 12 hours  apixaban 5 milliGRAM(s) Oral every 12 hours  aspirin enteric coated 81 milliGRAM(s) Oral daily  atorvastatin Oral Tab/Cap - Peds 40 milliGRAM(s) Oral at bedtime  chlorhexidine 4% Liquid 1 Application(s) Topical <User Schedule>  dextrose 40% Gel 15 Gram(s) Oral once  dextrose 5%. 1000 milliLiter(s) (50 mL/Hr) IV Continuous <Continuous>  dextrose 5%. 1000 milliLiter(s) (100 mL/Hr) IV Continuous <Continuous>  dextrose 50% Injectable 50 milliLiter(s) IV Push every 15 minutes  dextrose 50% Injectable 25 milliLiter(s) IV Push every 15 minutes  glucagon  Injectable 1 milliGRAM(s) IntraMuscular once  insulin lispro (ADMELOG) corrective regimen sliding scale   SubCutaneous three times a day before meals  metoprolol tartrate 25 milliGRAM(s) Oral every 6 hours  pantoprazole    Tablet 40 milliGRAM(s) Oral before breakfast  sodium chloride 0.9%. 1000 milliLiter(s) (20 mL/Hr) IV Continuous <Continuous>  tamsulosin 0.4 milliGRAM(s) Oral at bedtime    MEDICATIONS  (PRN):  acetaminophen   Tablet .. 650 milliGRAM(s) Oral every 6 hours PRN Temp greater or equal to 38C (100.4F), Mild Pain (1 - 3)  ketorolac   Injectable 15 milliGRAM(s) IV Push every 4 hours PRN Moderate Pain (4 - 6)  oxyCODONE    IR 10 milliGRAM(s) Oral every 4 hours PRN Severe Pain (7 - 10)  oxyCODONE    IR 5 milliGRAM(s) Oral every 6 hours PRN Moderate Pain (4 - 6)      REVIEW OF SYSTEMS:  CONSTITUTIONAL: [X] all negative; [ ] weakness, [ ] fevers, [ ] chills  EYES/ENT: [X] all negative; [ ] visual changes, [ ] vertigo, [ ] throat pain   NECK: [X] all negative; [ ] pain, [ ] stiffness  RESPIRATORY: [] all negative, [ ] cough, [ ] wheezing, [ ] hemoptysis, [ ] shortness of breath  CARDIOVASCULAR: [] all negative; [ ] chest pain, [ ] palpitations, [ ] orthopnea  GASTROINTESTINAL: [X] all negative; [ ]abdominal pain, [ ] nausea, [ ] vomiting, [ ] hematemesis, [ ] diarrhea, [ ] constipation, [ ] melena, [ ] hematochezia.  GENITOURINARY: [X] all negative; [ ] dysuria, [ ] frequency, [ ] hematuria  NEUROLOGICAL: [X] all negative; [ ] numbness, [ ] weakness  SKIN: [X] all negative; [ ] itching, [ ] burning, [ ] rashes, [ ] lesions   All other review of systems is negative unless indicated above.    [  ] Unable to assess ROS because     PHYSICAL EXAM:          CONSTITUTIONAL: Well-developed; well-nourished; in no acute distress.   	SKIN: warm, dry  	HEAD: Normocephalic; atraumatic.  	EYES: PERRL, EOM, no conj injection, sclera clear  	ENT: No nasal discharge; airway clear.  	NECK: Supple; non tender.  No midline ttp ctls  	CARD: S1, S2 normal; no murmurs, gallops, or rubs. Regular rate and rhythm. 2+ RPs and DPs bilat, no carotid bruits, no pedal   edema, no calf pain b/l  	RESP: CTA  bilat good air movement No wheezes, rales or rhonchi.  	ABD: Soft, not tender, not distended, no CVA ttp no rebound or guarding, bowel sounds present  	EXT: Normal ROM.  No clubbing, cyanosis or edema.   	  	NEURO: Alert, awake, motor 5/5 R, 5/5 L        RADIOLOGY:  xray  < from: Xray Chest 1 View- PORTABLE-Routine (Xray Chest 1 View- PORTABLE-Routine in AM.) (08.17.21 @ 07:11) >    Impression:  Stable bilateral pleural effusion/opacities.    < end of copied text >    I spent 45 minutes of critical care time examining patient, reviewing vitals, labs, medications, imaging and discussing with the team goals of care to prevent life-threatening in this patient who is at high risk for deterioration or death due to:

## 2021-08-17 NOTE — PROGRESS NOTE ADULT - ASSESSMENT
S/p CABG for severe CAD  Recovering well  PAF - episodes of AF with RVR - converted with Amio      C/w medical therapy  Switch Amio to PO - load 5 gm, then 200 mg qD for 2-3 months. Hold Multaq  glycemic control  C/w ASA, Eliquis  Statin  C/w BB  Add ACE-I or ARB if tolerated  post-op care as per CTS/CCM  Ambulate as tolerated  PT/rehab

## 2021-08-17 NOTE — PROGRESS NOTE ADULT - SUBJECTIVE AND OBJECTIVE BOX
OPERATIVE PROCEDURE(s):                POD #                       81yMale  SURGEON(s): ABHIJIT Smith  SUBJECTIVE ASSESSMENT:   Vital Signs Last 24 Hrs  T(F): 97.7 (17 Aug 2021 04:00), Max: 99 (16 Aug 2021 12:00)  HR: 54 (17 Aug 2021 07:00) (54 - 137)  BP: 137/60 (17 Aug 2021 07:00) (94/58 - 156/72)  BP(mean): 87 (17 Aug 2021 07:00) (70 - 105)  ABP: 105/51 (16 Aug 2021 12:00) (105/51 - 159/48)  ABP(mean): 70 (16 Aug 2021 12:00)  RR: 19 (17 Aug 2021 07:00) (10 - 65)  SpO2: 100% (17 Aug 2021 07:00) (93% - 100%)  CVP(mm Hg): --  CVP(cm H2O): --  CO: --  CI: --  PA: --  SVR: --    I&O's Detail    16 Aug 2021 07:01  -  17 Aug 2021 07:00  --------------------------------------------------------  IN:    Oral Fluid: 600 mL  Total IN: 600 mL    OUT:    Voided (mL): 450 mL  Total OUT: 450 mL        Net:   I&O's Detail    15 Aug 2021 07:01  -  16 Aug 2021 07:00  --------------------------------------------------------  Total NET: -1984.9 mL      16 Aug 2021 07:01  -  17 Aug 2021 07:00  --------------------------------------------------------  Total NET: 150 mL        CAPILLARY BLOOD GLUCOSE      POCT Blood Glucose.: 126 mg/dL (17 Aug 2021 06:40)  POCT Blood Glucose.: 122 mg/dL (16 Aug 2021 22:08)  POCT Blood Glucose.: 128 mg/dL (16 Aug 2021 16:19)  POCT Blood Glucose.: 135 mg/dL (16 Aug 2021 11:31)    Physical Exam:  General: NAD; A&Ox3/Patient is intubated and sedated  Cardiac: S1/S2, RRR, no murmur, no rubs  Lungs: unlabored respirations, CTA b/l, no wheeze, no rales, no crackles  Abdomen: Soft/NT/ND; positive bowel sounds x 4  Sternum: Intact, no click, incision healing well with no drainage  Incisions: Incisions clean/dry/intact  Extremities: No edema b/l lower extremities; good capillary refill; no cyanosis; palpable 1+ pedal pulses b/l    Central Venous Catheter: Yes[]  No[] , If Yes indication:           Day #  Vazquez Catheter: Yes  [] , No  [] , If yes indication:                      Day #  NGT: Yes [] No [] ,    If Yes Placement:                                     Day #  EPICARDIAL WIRES:  [] YES [] NO                                              Day #  BOWEL MOVEMENT:  [] YES [] NO, If No, Timing since last BM:      Day #  CHEST TUBE(Left/Right):  [] YES [] NO, If yes -  AIR LEAKS:  [] YES [] NO        LABS:                        9.3<L>  7.53  )-----------( 180      ( 17 Aug 2021 02:50 )             27.1<L>                        9.0<L>  10.24 )-----------( 148      ( 16 Aug 2021 01:00 )             24.9<L>    08-17    134<L>  |  101  |  26<H>  ----------------------------<  112<H>  4.6   |  22  |  1.0  08-16    130<L>  |  100  |  26<H>  ----------------------------<  116<H>  4.6   |  21  |  1.0    Ca    8.3<L>      17 Aug 2021 02:50  Mg     2.0     08-17    TPro  5.0<L> [6.0 - 8.0]  /  Alb  3.2<L> [3.5 - 5.2]  /  TBili  0.7 [0.2 - 1.2]  /  DBili  x   /  AST  43<H> [0 - 41]  /  ALT  54<H> [0 - 41]  /  AlkPhos  90 [30 - 115]  08-17        ABG - ( 16 Aug 2021 02:40 )  pH: 7.45  /  pCO2: 35    /  pO2: 63    / HCO3: 24    / Base Excess: 0.2   /  SaO2: 94    /  LA: 0.8              RADIOLOGY & ADDITIONAL TESTS:  CXR:  EKG:  MEDICATIONS  (STANDING):  acetaminophen  IVPB .. 1000 milliGRAM(s) IV Intermittent once  aMIOdarone    Tablet 400 milliGRAM(s) Oral every 12 hours  apixaban 5 milliGRAM(s) Oral every 12 hours  aspirin enteric coated 81 milliGRAM(s) Oral daily  atorvastatin Oral Tab/Cap - Peds 40 milliGRAM(s) Oral at bedtime  dextrose 40% Gel 15 Gram(s) Oral once  dextrose 5%. 1000 milliLiter(s) (50 mL/Hr) IV Continuous <Continuous>  dextrose 5%. 1000 milliLiter(s) (100 mL/Hr) IV Continuous <Continuous>  dextrose 50% Injectable 50 milliLiter(s) IV Push every 15 minutes  dextrose 50% Injectable 25 milliLiter(s) IV Push every 15 minutes  glucagon  Injectable 1 milliGRAM(s) IntraMuscular once  insulin lispro (ADMELOG) corrective regimen sliding scale   SubCutaneous three times a day before meals  metoprolol tartrate 25 milliGRAM(s) Oral every 6 hours  pantoprazole    Tablet 40 milliGRAM(s) Oral before breakfast  sodium chloride 0.9%. 1000 milliLiter(s) (20 mL/Hr) IV Continuous <Continuous>  tamsulosin 0.4 milliGRAM(s) Oral at bedtime    MEDICATIONS  (PRN):  acetaminophen   Tablet .. 650 milliGRAM(s) Oral every 6 hours PRN Temp greater or equal to 38C (100.4F), Mild Pain (1 - 3)  ketorolac   Injectable 15 milliGRAM(s) IV Push every 4 hours PRN Moderate Pain (4 - 6)  oxyCODONE    IR 10 milliGRAM(s) Oral every 4 hours PRN Severe Pain (7 - 10)  oxyCODONE    IR 5 milliGRAM(s) Oral every 6 hours PRN Moderate Pain (4 - 6)    HEPARIN:  [] YES [] NO  Dose: XX UNITS/HR UNITS Q8H  LOVENOX:[] YES [] NO  Dose: XX mg Q24H  COUMADIN: []  YES [] NO  Dose: XX mg  Q24H  SCD's: YES b/l  GI Prophylaxis: Protonix [], Pepcid [], None [], (Contra-indication:.....)    Post-Op Beta-Blockers: Yes [], No[], If No, then contraindication:  Post-Op Aspirin: Yes [],  No [], If No, then contraindication:  Post-Op Statin: Yes [], No[], If No, then contraindication:  Allergies    No Known Allergies    Intolerances      Ambulation/Activity Status:    Assessment/Plan:  81y Male status-post .....  - Case and plan discussed with CTU Intensivist and CT Surgeon - Dr. Valero/Luis/Marie  - Continue CTU supportive care    - Continue DVT/GI prophylaxis  - Incentive Spirometry 10 times an hour  - Continue to advance physical activity as tolerated and continue PT/OT as directed  1. CAD: Continue ASA, statin, BB  2. HTN:   3. A. Fib:   4. COPD/Hypoxia:   5. DM/Glucose Control:     Social Service Disposition:     OPERATIVE PROCEDURE(s):  CABGx4              POD # 5                          SURGEON(s): ABHIJIT Simth  SUBJECTIVE ASSESSMENT: 81yMale patient seen and examined at bedside. Patient is sitting comfortably in chair, eating. Denies any acute complaints   Vital Signs Last 24 Hrs  T(F): 97.7 (17 Aug 2021 04:00), Max: 99 (16 Aug 2021 12:00)  HR: 54 (17 Aug 2021 07:00) (54 - 137)  BP: 137/60 (17 Aug 2021 07:00) (94/58 - 156/72)  BP(mean): 87 (17 Aug 2021 07:00) (70 - 105)  ABP: 105/51 (16 Aug 2021 12:00) (105/51 - 159/48)  ABP(mean): 70 (16 Aug 2021 12:00)  RR: 19 (17 Aug 2021 07:00) (10 - 65)  SpO2: 100% (17 Aug 2021 07:00) (93% - 100%)    I&O's Detail  16 Aug 2021 07:01  -  17 Aug 2021 07:00  --------------------------------------------------------  IN:    Oral Fluid: 600 mL  Total IN: 600 mL    OUT:    Voided (mL): 450 mL  Total OUT: 450 mL    Net:   I&O's Detail  15 Aug 2021 07:01  -  16 Aug 2021 07:00  --------------------------------------------------------  Total NET: -1984.9 mL      16 Aug 2021 07:01  -  17 Aug 2021 07:00  --------------------------------------------------------  Total NET: 150 mL        CAPILLARY BLOOD GLUCOSE  POCT Blood Glucose.: 126 mg/dL (17 Aug 2021 06:40)  POCT Blood Glucose.: 122 mg/dL (16 Aug 2021 22:08)  POCT Blood Glucose.: 128 mg/dL (16 Aug 2021 16:19)  POCT Blood Glucose.: 135 mg/dL (16 Aug 2021 11:31)    Physical Exam:  General: NAD; A&Ox3  Cardiac: S1/S2, RRR, no murmur, no rubs  Lungs: unlabored respirations, CTA b/l, no wheeze, no rales, no crackles  Abdomen: Soft/NT/ND; positive bowel sounds x 4  Sternum: Intact, no click, incision healing well with no drainage  Incisions: Incisions clean/dry/intact  Extremities: non-pitting edema b/l lower extremities, Right +1 pretibial, Left +2 pretibial; good capillary refill; no cyanosis; pedal pulses b/l difficult to palpate, cool feet, no calf tenderness    Central Venous Catheter: Yes[]  No[X] , If Yes indication:           Day #  Vazquez Catheter: Yes  [] , No  [X] , If yes indication:                      Day #  EPICARDIAL WIRES:  [X] YES [] NO                                              Day # 5  BOWEL MOVEMENT:  [X] YES [] NO, If No, Timing since last BM:      Day #  CHEST TUBE(Left/Right):  [] YES [X] NO, If yes -  AIR LEAKS:  [] YES [] NO        LABS:                        9.3<L>  7.53  )-----------( 180      ( 17 Aug 2021 02:50 )             27.1<L>                        9.0<L>  10.24 )-----------( 148      ( 16 Aug 2021 01:00 )             24.9<L>    08-17    134<L>  |  101  |  26<H>  ----------------------------<  112<H>  4.6   |  22  |  1.0  08-16    130<L>  |  100  |  26<H>  ----------------------------<  116<H>  4.6   |  21  |  1.0    Ca    8.3<L>      17 Aug 2021 02:50  Mg     2.0     08-17    TPro  5.0<L> [6.0 - 8.0]  /  Alb  3.2<L> [3.5 - 5.2]  /  TBili  0.7 [0.2 - 1.2]  /  DBili  x   /  AST  43<H> [0 - 41]  /  ALT  54<H> [0 - 41]  /  AlkPhos  90 [30 - 115]  08-17        ABG - ( 16 Aug 2021 02:40 )  pH: 7.45  /  pCO2: 35    /  pO2: 63    / HCO3: 24    / Base Excess: 0.2   /  SaO2: 94    /  LA: 0.8        RADIOLOGY & ADDITIONAL TESTS:  CXR: < from: Xray Chest 1 View- PORTABLE-Routine (Xray Chest 1 View- PORTABLE-Routine in AM.) (08.17.21 @ 07:11) >  Impression:  Stable bilateral pleural effusion/opacities.    EKG: < from: 12 Lead ECG (08.16.21 @ 07:30) >  Ventricular Rate 57 BPM    Atrial Rate 57 BPM    P-R Interval 156 ms    QRS Duration 102 ms    Q-T Interval 540 ms    QTC Calculation(Bazett) 525 ms    P Axis 49 degrees    R Axis -9 degrees    T Axis 14 degrees    Diagnosis Line Sinus bradycardia  Possible Left atrial enlargement  Nonspecific ST abnormality  Prolonged QT  Abnormal ECG      MEDICATIONS  (STANDING):  acetaminophen  IVPB .. 1000 milliGRAM(s) IV Intermittent once  aMIOdarone    Tablet 400 milliGRAM(s) Oral every 12 hours  apixaban 5 milliGRAM(s) Oral every 12 hours  aspirin enteric coated 81 milliGRAM(s) Oral daily  atorvastatin Oral Tab/Cap - Peds 40 milliGRAM(s) Oral at bedtime  dextrose 40% Gel 15 Gram(s) Oral once  dextrose 5%. 1000 milliLiter(s) (50 mL/Hr) IV Continuous <Continuous>  dextrose 5%. 1000 milliLiter(s) (100 mL/Hr) IV Continuous <Continuous>  dextrose 50% Injectable 50 milliLiter(s) IV Push every 15 minutes  dextrose 50% Injectable 25 milliLiter(s) IV Push every 15 minutes  glucagon  Injectable 1 milliGRAM(s) IntraMuscular once  insulin lispro (ADMELOG) corrective regimen sliding scale   SubCutaneous three times a day before meals  metoprolol tartrate 25 milliGRAM(s) Oral every 6 hours  pantoprazole    Tablet 40 milliGRAM(s) Oral before breakfast  sodium chloride 0.9%. 1000 milliLiter(s) (20 mL/Hr) IV Continuous <Continuous>  tamsulosin 0.4 milliGRAM(s) Oral at bedtime    MEDICATIONS  (PRN):  acetaminophen   Tablet .. 650 milliGRAM(s) Oral every 6 hours PRN Temp greater or equal to 38C (100.4F), Mild Pain (1 - 3)  ketorolac   Injectable 15 milliGRAM(s) IV Push every 4 hours PRN Moderate Pain (4 - 6)  oxyCODONE    IR 10 milliGRAM(s) Oral every 4 hours PRN Severe Pain (7 - 10)  oxyCODONE    IR 5 milliGRAM(s) Oral every 6 hours PRN Moderate Pain (4 - 6)      APIXABAN: X YES [] NO Dose: 5mg oral every 12 hours  SCD's: YES b/l  GI Prophylaxis: Pantoprazole 40mg     Post-Op Beta-Blockers: Yes X, No[], If No, then contraindication:  Post-Op Aspirin: Yes X  No [], If No, then contraindication:  Post-Op Statin: Yes X No[], If No, then contraindication:    Allergies:  No Known Allergies    Ambulation/Activity Status: Patient is ambulating well with assistance     Assessment/Plan:  81y Male status-post GABG x4    POD # 5  - Case and plan discussed with CTU Intensivist and CT Surgeon - Dr. Smith  - Continue CTU supportive care    - Continue DVT/GI prophylaxis  - Incentive Spirometry 10 times an hour  - Continue to advance physical activity as tolerated and continue PT/OT as directed  1. CAD s/p GABG: Continue ASA, statin, BB  2. HTN: Continue Metoprolol Tartrate   3. A. Fib: Amiodarone 200mg, oral on discharge, continue Eliquis   4. CDOPD/Hypoxia: cont nebs, mucinex wean o2 as tolerated, encourage incentive spirometry, lasix 40mg on discharge for fluid overload    5. DM/Glucose Control: Continue sliding scale    Social Service Disposition: discharge home tomorrow     OPERATIVE PROCEDURE(s):  CABGx4              POD # 5                          SURGEON(s): ABHIJIT Smith  SUBJECTIVE ASSESSMENT: 81yMale patient seen and examined at bedside. Patient is sitting comfortably in chair, eating. Denies any acute complaints   Vital Signs Last 24 Hrs  T(F): 97.7 (17 Aug 2021 04:00), Max: 99 (16 Aug 2021 12:00)  HR: 54 (17 Aug 2021 07:00) (54 - 137)  BP: 137/60 (17 Aug 2021 07:00) (94/58 - 156/72)  BP(mean): 87 (17 Aug 2021 07:00) (70 - 105)  ABP: 105/51 (16 Aug 2021 12:00) (105/51 - 159/48)  ABP(mean): 70 (16 Aug 2021 12:00)  RR: 19 (17 Aug 2021 07:00) (10 - 65)  SpO2: 100% (17 Aug 2021 07:00) (93% - 100%)    I&O's Detail  16 Aug 2021 07:01  -  17 Aug 2021 07:00  --------------------------------------------------------  IN:    Oral Fluid: 600 mL  Total IN: 600 mL    OUT:    Voided (mL): 450 mL  Total OUT: 450 mL    Net:   I&O's Detail  15 Aug 2021 07:01  -  16 Aug 2021 07:00  --------------------------------------------------------  Total NET: -1984.9 mL      16 Aug 2021 07:01  -  17 Aug 2021 07:00  --------------------------------------------------------  Total NET: 150 mL        CAPILLARY BLOOD GLUCOSE  POCT Blood Glucose.: 126 mg/dL (17 Aug 2021 06:40)  POCT Blood Glucose.: 122 mg/dL (16 Aug 2021 22:08)  POCT Blood Glucose.: 128 mg/dL (16 Aug 2021 16:19)  POCT Blood Glucose.: 135 mg/dL (16 Aug 2021 11:31)    Physical Exam:  General: NAD; A&Ox3  Cardiac: S1/S2, RRR, no murmur, no rubs  Lungs: unlabored respirations, CTA b/l, no wheeze, no rales, no crackles  Abdomen: Soft/NT/ND; positive bowel sounds x 4  Sternum: Intact, no click, incision healing well with no drainage  Incisions: Incisions clean/dry/intact  Extremities: non-pitting edema b/l lower extremities, Right +1 pretibial, Left +2 pretibial; good capillary refill; no cyanosis; pedal pulses b/l difficult to palpate, cool feet, no calf tenderness    Central Venous Catheter: Yes[]  No[X] , If Yes indication:           Day #  Vazquez Catheter: Yes  [] , No  [X] , If yes indication:                      Day #  EPICARDIAL WIRES:  [X] YES [] NO                                              Day # 5  BOWEL MOVEMENT:  [X] YES [] NO, If No, Timing since last BM:      Day #  CHEST TUBE(Left/Right):  [] YES [X] NO, If yes -  AIR LEAKS:  [] YES [] NO        LABS:                        9.3<L>  7.53  )-----------( 180      ( 17 Aug 2021 02:50 )             27.1<L>                        9.0<L>  10.24 )-----------( 148      ( 16 Aug 2021 01:00 )             24.9<L>    08-17    134<L>  |  101  |  26<H>  ----------------------------<  112<H>  4.6   |  22  |  1.0  08-16    130<L>  |  100  |  26<H>  ----------------------------<  116<H>  4.6   |  21  |  1.0    Ca    8.3<L>      17 Aug 2021 02:50  Mg     2.0     08-17    TPro  5.0<L> [6.0 - 8.0]  /  Alb  3.2<L> [3.5 - 5.2]  /  TBili  0.7 [0.2 - 1.2]  /  DBili  x   /  AST  43<H> [0 - 41]  /  ALT  54<H> [0 - 41]  /  AlkPhos  90 [30 - 115]  08-17        ABG - ( 16 Aug 2021 02:40 )  pH: 7.45  /  pCO2: 35    /  pO2: 63    / HCO3: 24    / Base Excess: 0.2   /  SaO2: 94    /  LA: 0.8        RADIOLOGY & ADDITIONAL TESTS:  CXR: < from: 12 Lead ECG (08.17.21 @ 08:11) >  Ventricular Rate 60 BPM    Atrial Rate 60 BPM    P-R Interval 148 ms    QRS Duration 102 ms    Q-T Interval 556 ms    QTC Calculation(Bazett) 556 ms    P Axis 70 degrees    R Axis 17 degrees    T Axis 48 degrees    Diagnosis Line Normal sinus rhythm  Inferior infarct , age undetermined  Prolonged QT  Abnormal ECG      MEDICATIONS  (STANDING):  acetaminophen  IVPB .. 1000 milliGRAM(s) IV Intermittent once  aMIOdarone    Tablet 400 milliGRAM(s) Oral every 12 hours  apixaban 5 milliGRAM(s) Oral every 12 hours  aspirin enteric coated 81 milliGRAM(s) Oral daily  atorvastatin Oral Tab/Cap - Peds 40 milliGRAM(s) Oral at bedtime  dextrose 40% Gel 15 Gram(s) Oral once  dextrose 5%. 1000 milliLiter(s) (50 mL/Hr) IV Continuous <Continuous>  dextrose 5%. 1000 milliLiter(s) (100 mL/Hr) IV Continuous <Continuous>  dextrose 50% Injectable 50 milliLiter(s) IV Push every 15 minutes  dextrose 50% Injectable 25 milliLiter(s) IV Push every 15 minutes  glucagon  Injectable 1 milliGRAM(s) IntraMuscular once  insulin lispro (ADMELOG) corrective regimen sliding scale   SubCutaneous three times a day before meals  metoprolol tartrate 25 milliGRAM(s) Oral every 6 hours  pantoprazole    Tablet 40 milliGRAM(s) Oral before breakfast  sodium chloride 0.9%. 1000 milliLiter(s) (20 mL/Hr) IV Continuous <Continuous>  tamsulosin 0.4 milliGRAM(s) Oral at bedtime    MEDICATIONS  (PRN):  acetaminophen   Tablet .. 650 milliGRAM(s) Oral every 6 hours PRN Temp greater or equal to 38C (100.4F), Mild Pain (1 - 3)  ketorolac   Injectable 15 milliGRAM(s) IV Push every 4 hours PRN Moderate Pain (4 - 6)  oxyCODONE    IR 10 milliGRAM(s) Oral every 4 hours PRN Severe Pain (7 - 10)  oxyCODONE    IR 5 milliGRAM(s) Oral every 6 hours PRN Moderate Pain (4 - 6)      APIXABAN: X YES [] NO Dose: 5mg oral every 12 hours  SCD's: YES b/l  GI Prophylaxis: Pantoprazole 40mg     Post-Op Beta-Blockers: Yes X, No[], If No, then contraindication:  Post-Op Aspirin: Yes X  No [], If No, then contraindication:  Post-Op Statin: Yes X No[], If No, then contraindication:    Allergies:  No Known Allergies    Ambulation/Activity Status: Patient is ambulating well with assistance     Assessment/Plan:  81y Male status-post GABG x4    POD # 5  - Case and plan discussed with CTU Intensivist and CT Surgeon - Dr. Smith  - Continue CTU supportive care    - Continue DVT/GI prophylaxis  - Incentive Spirometry 10 times an hour  - Continue to advance physical activity as tolerated and continue PT/OT as directed  1. CAD s/p GABG: Continue ASA, statin, BB  2. HTN: Continue Metoprolol Tartrate   3. A. Fib: Amiodarone 200mg, oral on discharge, continue Eliquis   4. CDOPD/Hypoxia: cont nebs, mucinex wean o2 as tolerated, encourage incentive spirometry, lasix 40mg on discharge for fluid overload    5. DM/Glucose Control: Continue sliding scale    Social Service Disposition: discharge home tomorrow     OPERATIVE PROCEDURE(s):  CABGx4              POD # 5                          SURGEON(s): ABHIJIT Smith  SUBJECTIVE ASSESSMENT: 81yMale patient seen and examined at bedside. Patient is sitting comfortably in chair, eating. Denies any acute complaints   Vital Signs Last 24 Hrs  T(F): 97.7 (17 Aug 2021 04:00), Max: 99 (16 Aug 2021 12:00)  HR: 54 (17 Aug 2021 07:00) (54 - 137)  BP: 137/60 (17 Aug 2021 07:00) (94/58 - 156/72)  BP(mean): 87 (17 Aug 2021 07:00) (70 - 105)  ABP: 105/51 (16 Aug 2021 12:00) (105/51 - 159/48)  ABP(mean): 70 (16 Aug 2021 12:00)  RR: 19 (17 Aug 2021 07:00) (10 - 65)  SpO2: 100% (17 Aug 2021 07:00) (93% - 100%)    I&O's Detail  16 Aug 2021 07:01  -  17 Aug 2021 07:00  --------------------------------------------------------  IN:    Oral Fluid: 600 mL  Total IN: 600 mL    OUT:    Voided (mL): 450 mL  Total OUT: 450 mL    Net:   I&O's Detail  15 Aug 2021 07:01  -  16 Aug 2021 07:00  --------------------------------------------------------  Total NET: -1984.9 mL      16 Aug 2021 07:01  -  17 Aug 2021 07:00  --------------------------------------------------------  Total NET: 150 mL        CAPILLARY BLOOD GLUCOSE  POCT Blood Glucose.: 126 mg/dL (17 Aug 2021 06:40)  POCT Blood Glucose.: 122 mg/dL (16 Aug 2021 22:08)  POCT Blood Glucose.: 128 mg/dL (16 Aug 2021 16:19)  POCT Blood Glucose.: 135 mg/dL (16 Aug 2021 11:31)    Physical Exam:  General: NAD; A&Ox3  Cardiac: S1/S2, RRR, no murmur, no rubs  Lungs: unlabored respirations, CTA b/l, no wheeze, no rales, no crackles  Abdomen: Soft/NT/ND; positive bowel sounds x 4  Sternum: Intact, no click, incision healing well with no drainage  Incisions: Incisions clean/dry/intact  Extremities: non-pitting edema b/l lower extremities, Right +1 pretibial, Left +2 pretibial; good capillary refill; no cyanosis; pedal pulses b/l difficult to palpate, cool feet, no calf tenderness    Central Venous Catheter: Yes[]  No[X] , If Yes indication:           Day #  Vazquez Catheter: Yes  [] , No  [X] , If yes indication:                      Day #  EPICARDIAL WIRES:  [X] YES [] NO                                              Day # 5  BOWEL MOVEMENT:  [X] YES [] NO, If No, Timing since last BM:      Day #  CHEST TUBE(Left/Right):  [] YES [X] NO, If yes -  AIR LEAKS:  [] YES [] NO        LABS:                        9.3<L>  7.53  )-----------( 180      ( 17 Aug 2021 02:50 )             27.1<L>                        9.0<L>  10.24 )-----------( 148      ( 16 Aug 2021 01:00 )             24.9<L>    08-17    134<L>  |  101  |  26<H>  ----------------------------<  112<H>  4.6   |  22  |  1.0  08-16    130<L>  |  100  |  26<H>  ----------------------------<  116<H>  4.6   |  21  |  1.0    Ca    8.3<L>      17 Aug 2021 02:50  Mg     2.0     08-17    TPro  5.0<L> [6.0 - 8.0]  /  Alb  3.2<L> [3.5 - 5.2]  /  TBili  0.7 [0.2 - 1.2]  /  DBili  x   /  AST  43<H> [0 - 41]  /  ALT  54<H> [0 - 41]  /  AlkPhos  90 [30 - 115]  08-17        ABG - ( 16 Aug 2021 02:40 )  pH: 7.45  /  pCO2: 35    /  pO2: 63    / HCO3: 24    / Base Excess: 0.2   /  SaO2: 94    /  LA: 0.8        RADIOLOGY & ADDITIONAL TESTS:  CXR: < from: 12 Lead ECG (08.17.21 @ 08:11) >  Ventricular Rate 60 BPM    Atrial Rate 60 BPM    P-R Interval 148 ms    QRS Duration 102 ms    Q-T Interval 556 ms    QTC Calculation(Bazett) 556 ms    P Axis 70 degrees    R Axis 17 degrees    T Axis 48 degrees    Diagnosis Line Normal sinus rhythm  Inferior infarct , age undetermined  Prolonged QT  Abnormal ECG      MEDICATIONS  (STANDING):  acetaminophen  IVPB .. 1000 milliGRAM(s) IV Intermittent once  aMIOdarone    Tablet 400 milliGRAM(s) Oral every 12 hours  apixaban 5 milliGRAM(s) Oral every 12 hours  aspirin enteric coated 81 milliGRAM(s) Oral daily  atorvastatin Oral Tab/Cap - Peds 40 milliGRAM(s) Oral at bedtime  dextrose 40% Gel 15 Gram(s) Oral once  dextrose 5%. 1000 milliLiter(s) (50 mL/Hr) IV Continuous <Continuous>  dextrose 5%. 1000 milliLiter(s) (100 mL/Hr) IV Continuous <Continuous>  dextrose 50% Injectable 50 milliLiter(s) IV Push every 15 minutes  dextrose 50% Injectable 25 milliLiter(s) IV Push every 15 minutes  glucagon  Injectable 1 milliGRAM(s) IntraMuscular once  insulin lispro (ADMELOG) corrective regimen sliding scale   SubCutaneous three times a day before meals  metoprolol tartrate 25 milliGRAM(s) Oral every 6 hours  pantoprazole    Tablet 40 milliGRAM(s) Oral before breakfast  sodium chloride 0.9%. 1000 milliLiter(s) (20 mL/Hr) IV Continuous <Continuous>  tamsulosin 0.4 milliGRAM(s) Oral at bedtime    MEDICATIONS  (PRN):  acetaminophen   Tablet .. 650 milliGRAM(s) Oral every 6 hours PRN Temp greater or equal to 38C (100.4F), Mild Pain (1 - 3)  ketorolac   Injectable 15 milliGRAM(s) IV Push every 4 hours PRN Moderate Pain (4 - 6)  oxyCODONE    IR 10 milliGRAM(s) Oral every 4 hours PRN Severe Pain (7 - 10)  oxyCODONE    IR 5 milliGRAM(s) Oral every 6 hours PRN Moderate Pain (4 - 6)      APIXABAN: X YES [] NO Dose: 5mg oral every 12 hours  SCD's: YES b/l  GI Prophylaxis: Pantoprazole 40mg     Post-Op Beta-Blockers: Yes X, No[], If No, then contraindication:  Post-Op Aspirin: Yes X  No [], If No, then contraindication:  Post-Op Statin: Yes X No[], If No, then contraindication:    Allergies:  No Known Allergies    Ambulation/Activity Status: Patient is ambulating well with assistance     Assessment/Plan:  81y Male status-post GABG x4    POD # 5  - Case and plan discussed with CTU Intensivist and CT Surgeon - Dr. Smith  - Continue CTU supportive care    - Continue DVT/GI prophylaxis  - Incentive Spirometry 10 times an hour  - Continue to advance physical activity as tolerated and continue PT/OT as directed  1. CAD s/p CABG: Continue ASA, statin, BB, pain control as needed  2. HTN: Continue Metoprolol Tartrate   3. A. Fib: Amiodarone 200mg, continue Eliquis   4. CDOPD/Hypoxia: cont nebs, mucinex wean o2 as tolerated, encourage incentive spirometry, lasix 40mg for fluid overload    5. DM/Glucose Control: Continue sliding scale    Social Service Disposition: discharge home tomorrow

## 2021-08-17 NOTE — PROGRESS NOTE ADULT - SUBJECTIVE AND OBJECTIVE BOX
Patient is a 81y old  Male who presents with a chief complaint of Chest pain (16 Aug 2021 11:03)        SUBJ:  Patient seen and examined. Back in NSR. BP is normal. No chest pain. C/o fatigue.      MEDICATIONS  (STANDING):  acetaminophen  IVPB .. 1000 milliGRAM(s) IV Intermittent once  aMIOdarone    Tablet 400 milliGRAM(s) Oral every 12 hours  apixaban 5 milliGRAM(s) Oral every 12 hours  aspirin enteric coated 81 milliGRAM(s) Oral daily  atorvastatin Oral Tab/Cap - Peds 40 milliGRAM(s) Oral at bedtime  dextrose 40% Gel 15 Gram(s) Oral once  dextrose 5%. 1000 milliLiter(s) (50 mL/Hr) IV Continuous <Continuous>  dextrose 5%. 1000 milliLiter(s) (100 mL/Hr) IV Continuous <Continuous>  dextrose 50% Injectable 50 milliLiter(s) IV Push every 15 minutes  dextrose 50% Injectable 25 milliLiter(s) IV Push every 15 minutes  glucagon  Injectable 1 milliGRAM(s) IntraMuscular once  insulin lispro (ADMELOG) corrective regimen sliding scale   SubCutaneous three times a day before meals  metoprolol tartrate 25 milliGRAM(s) Oral every 6 hours  pantoprazole    Tablet 40 milliGRAM(s) Oral before breakfast  sodium chloride 0.9%. 1000 milliLiter(s) (20 mL/Hr) IV Continuous <Continuous>  tamsulosin 0.4 milliGRAM(s) Oral at bedtime    MEDICATIONS  (PRN):  acetaminophen   Tablet .. 650 milliGRAM(s) Oral every 6 hours PRN Temp greater or equal to 38C (100.4F), Mild Pain (1 - 3)  ketorolac   Injectable 15 milliGRAM(s) IV Push every 4 hours PRN Moderate Pain (4 - 6)  oxyCODONE    IR 10 milliGRAM(s) Oral every 4 hours PRN Severe Pain (7 - 10)  oxyCODONE    IR 5 milliGRAM(s) Oral every 6 hours PRN Moderate Pain (4 - 6)            Vital Signs Last 24 Hrs  T(C): 36.5 (17 Aug 2021 04:00), Max: 37.2 (16 Aug 2021 12:00)  T(F): 97.7 (17 Aug 2021 04:00), Max: 99 (16 Aug 2021 12:00)  HR: 54 (17 Aug 2021 07:00) (54 - 137)  BP: 137/60 (17 Aug 2021 07:00) (94/58 - 156/72)  BP(mean): 87 (17 Aug 2021 07:00) (70 - 105)  RR: 19 (17 Aug 2021 07:00) (10 - 65)  SpO2: 100% (17 Aug 2021 07:00) (93% - 100%)      PHYSICAL EXAM:    GEN: AAO x 3, NAD  HEENT: NC/AT, PERRL  Neck: No JVD, no bruits  CV: Reg, S1-S2, no murmur  Lungs: CTAB  Abd: Soft, non-tender  Ext: No edema        08-16-21 @ 07:01  -  08-17-21 @ 07:00  --------------------------------------------------------  IN: 600 mL / OUT: 450 mL / NET: 150 mL        I&O's Summary    16 Aug 2021 07:01  -  17 Aug 2021 07:00  --------------------------------------------------------  IN: 600 mL / OUT: 450 mL / NET: 150 mL    	    TELEMETRY: SB    ECG:  < from: 12 Lead ECG (08.16.21 @ 07:30) >  Sinus bradycardia  Possible Left atrial enlargement  Nonspecific ST abnormality  Prolonged QT  Abnormal ECG    < end of copied text >    < from: 12 Lead ECG (08.15.21 @ 07:44) >  Atrial fibrillation with rapid ventricular response  Inferiorinfarct , age undetermined  Abnormal ECG      < end of copied text >    TTE:    < from: TTE Echo Complete w/o Contrast w/ Doppler (08.09.21 @ 14:14) >    Summary:   1. Normal global left ventricular systolic function.   2. LV Ejection Fraction by Maldonado's Method with a biplane EF of 62 %.   3. Normal left ventricular internal cavity size.   4. The mean global longitudinal peak strain by speckle tracking is -17.8% which is borderline normal.   5. Normal left atrial size.   6. Normal right atrial size.   7. No evidence of mitral valve regurgitation.   8. Mild tricuspid regurgitation.   9. Sclerotic aortic valve with normal opening.  10. Mild pulmonic valve regurgitation.  11. Estimated pulmonary artery systolic pressure is 37.8 mmHg assuming a right atrial pressure of 8 mmHg, which is consistent with borderline pulmonary hypertension.  12. LA volume Index is 25.7 ml/m² ml/m2.      < end of copied text >    LABS:                        9.3    7.53  )-----------( 180      ( 17 Aug 2021 02:50 )             27.1     08-17    134<L>  |  101  |  26<H>  ----------------------------<  112<H>  4.6   |  22  |  1.0    Ca    8.3<L>      17 Aug 2021 02:50  Mg     2.0     08-17    TPro  5.0<L>  /  Alb  3.2<L>  /  TBili  0.7  /  DBili  x   /  AST  43<H>  /  ALT  54<H>  /  AlkPhos  90  08-17              BNP  RADIOLOGY & ADDITIONAL STUDIES:      IMPRESSION AND PLAN:

## 2021-08-18 PROBLEM — E78.5 HYPERLIPIDEMIA, UNSPECIFIED: Chronic | Status: ACTIVE | Noted: 2021-08-08

## 2021-08-18 PROBLEM — Z00.00 ENCOUNTER FOR PREVENTIVE HEALTH EXAMINATION: Status: ACTIVE | Noted: 2021-08-18

## 2021-08-18 PROBLEM — I25.10 ATHEROSCLEROTIC HEART DISEASE OF NATIVE CORONARY ARTERY WITHOUT ANGINA PECTORIS: Chronic | Status: ACTIVE | Noted: 2021-08-08

## 2021-08-18 PROBLEM — I10 ESSENTIAL (PRIMARY) HYPERTENSION: Chronic | Status: ACTIVE | Noted: 2021-08-08

## 2021-08-18 LAB
ALBUMIN SERPL ELPH-MCNC: 3.6 G/DL — SIGNIFICANT CHANGE UP (ref 3.5–5.2)
ALP SERPL-CCNC: 104 U/L — SIGNIFICANT CHANGE UP (ref 30–115)
ALT FLD-CCNC: 82 U/L — HIGH (ref 0–41)
ANION GAP SERPL CALC-SCNC: 11 MMOL/L — SIGNIFICANT CHANGE UP (ref 7–14)
ANISOCYTOSIS BLD QL: SLIGHT — SIGNIFICANT CHANGE UP
AST SERPL-CCNC: 50 U/L — HIGH (ref 0–41)
BASOPHILS # BLD AUTO: 0 K/UL — SIGNIFICANT CHANGE UP (ref 0–0.2)
BASOPHILS NFR BLD AUTO: 0 % — SIGNIFICANT CHANGE UP (ref 0–1)
BILIRUB SERPL-MCNC: 0.7 MG/DL — SIGNIFICANT CHANGE UP (ref 0.2–1.2)
BUN SERPL-MCNC: 21 MG/DL — HIGH (ref 10–20)
CALCIUM SERPL-MCNC: 8.5 MG/DL — SIGNIFICANT CHANGE UP (ref 8.5–10.1)
CHLORIDE SERPL-SCNC: 101 MMOL/L — SIGNIFICANT CHANGE UP (ref 98–110)
CO2 SERPL-SCNC: 24 MMOL/L — SIGNIFICANT CHANGE UP (ref 17–32)
CREAT SERPL-MCNC: 0.9 MG/DL — SIGNIFICANT CHANGE UP (ref 0.7–1.5)
EOSINOPHIL # BLD AUTO: 0 K/UL — SIGNIFICANT CHANGE UP (ref 0–0.7)
EOSINOPHIL NFR BLD AUTO: 0 % — SIGNIFICANT CHANGE UP (ref 0–8)
GIANT PLATELETS BLD QL SMEAR: PRESENT — SIGNIFICANT CHANGE UP
GLUCOSE BLDC GLUCOMTR-MCNC: 127 MG/DL — HIGH (ref 70–99)
GLUCOSE BLDC GLUCOMTR-MCNC: 132 MG/DL — HIGH (ref 70–99)
GLUCOSE BLDC GLUCOMTR-MCNC: 146 MG/DL — HIGH (ref 70–99)
GLUCOSE BLDC GLUCOMTR-MCNC: 156 MG/DL — HIGH (ref 70–99)
GLUCOSE SERPL-MCNC: 124 MG/DL — HIGH (ref 70–99)
HCT VFR BLD CALC: 28.5 % — LOW (ref 42–52)
HGB BLD-MCNC: 9.8 G/DL — LOW (ref 14–18)
LYMPHOCYTES # BLD AUTO: 1.99 K/UL — SIGNIFICANT CHANGE UP (ref 1.2–3.4)
LYMPHOCYTES # BLD AUTO: 18.3 % — LOW (ref 20.5–51.1)
MAGNESIUM SERPL-MCNC: 1.9 MG/DL — SIGNIFICANT CHANGE UP (ref 1.8–2.4)
MANUAL SMEAR VERIFICATION: SIGNIFICANT CHANGE UP
MCHC RBC-ENTMCNC: 31.5 PG — HIGH (ref 27–31)
MCHC RBC-ENTMCNC: 34.4 G/DL — SIGNIFICANT CHANGE UP (ref 32–37)
MCV RBC AUTO: 91.6 FL — SIGNIFICANT CHANGE UP (ref 80–94)
MONOCYTES # BLD AUTO: 0.57 K/UL — SIGNIFICANT CHANGE UP (ref 0.1–0.6)
MONOCYTES NFR BLD AUTO: 5.2 % — SIGNIFICANT CHANGE UP (ref 1.7–9.3)
MYELOCYTES NFR BLD: 1.7 % — HIGH (ref 0–0)
NEUTROPHILS # BLD AUTO: 8.15 K/UL — HIGH (ref 1.4–6.5)
NEUTROPHILS NFR BLD AUTO: 74.8 % — SIGNIFICANT CHANGE UP (ref 42.2–75.2)
PLAT MORPH BLD: NORMAL — SIGNIFICANT CHANGE UP
PLATELET # BLD AUTO: 233 K/UL — SIGNIFICANT CHANGE UP (ref 130–400)
POLYCHROMASIA BLD QL SMEAR: SIGNIFICANT CHANGE UP
POTASSIUM SERPL-MCNC: 4.5 MMOL/L — SIGNIFICANT CHANGE UP (ref 3.5–5)
POTASSIUM SERPL-SCNC: 4.5 MMOL/L — SIGNIFICANT CHANGE UP (ref 3.5–5)
PROT SERPL-MCNC: 5.6 G/DL — LOW (ref 6–8)
RBC # BLD: 3.11 M/UL — LOW (ref 4.7–6.1)
RBC # FLD: 11.9 % — SIGNIFICANT CHANGE UP (ref 11.5–14.5)
RBC BLD AUTO: ABNORMAL
SODIUM SERPL-SCNC: 136 MMOL/L — SIGNIFICANT CHANGE UP (ref 135–146)
WBC # BLD: 10.9 K/UL — HIGH (ref 4.8–10.8)
WBC # FLD AUTO: 10.9 K/UL — HIGH (ref 4.8–10.8)

## 2021-08-18 PROCEDURE — 71046 X-RAY EXAM CHEST 2 VIEWS: CPT | Mod: 26

## 2021-08-18 PROCEDURE — 93010 ELECTROCARDIOGRAM REPORT: CPT

## 2021-08-18 PROCEDURE — 99232 SBSQ HOSP IP/OBS MODERATE 35: CPT

## 2021-08-18 RX ORDER — METOPROLOL TARTRATE 50 MG
25 TABLET ORAL EVERY 8 HOURS
Refills: 0 | Status: DISCONTINUED | OUTPATIENT
Start: 2021-08-18 | End: 2021-08-19

## 2021-08-18 RX ADMIN — Medication 25 MILLIGRAM(S): at 06:04

## 2021-08-18 RX ADMIN — Medication 15 MILLIGRAM(S): at 08:15

## 2021-08-18 RX ADMIN — Medication 25 MILLIGRAM(S): at 14:12

## 2021-08-18 RX ADMIN — Medication 2: at 18:06

## 2021-08-18 RX ADMIN — TAMSULOSIN HYDROCHLORIDE 0.4 MILLIGRAM(S): 0.4 CAPSULE ORAL at 21:59

## 2021-08-18 RX ADMIN — Medication 15 MILLIGRAM(S): at 08:30

## 2021-08-18 RX ADMIN — PANTOPRAZOLE SODIUM 40 MILLIGRAM(S): 20 TABLET, DELAYED RELEASE ORAL at 06:04

## 2021-08-18 RX ADMIN — CHLORHEXIDINE GLUCONATE 1 APPLICATION(S): 213 SOLUTION TOPICAL at 06:06

## 2021-08-18 RX ADMIN — APIXABAN 5 MILLIGRAM(S): 2.5 TABLET, FILM COATED ORAL at 06:04

## 2021-08-18 RX ADMIN — Medication 25 MILLIGRAM(S): at 21:59

## 2021-08-18 RX ADMIN — Medication 81 MILLIGRAM(S): at 11:56

## 2021-08-18 NOTE — PROGRESS NOTE ADULT - SUBJECTIVE AND OBJECTIVE BOX
Patient is a 81y old  Male who presents with a chief complaint of Chest pain (17 Aug 2021 13:28)    HPI:  Patient is a 80 yo M hx of CAD s/p PCI x 3 last 2010, Afib on Eliquis and HLD presented with cc of chest pain. Chest pain started  last night around 11pm, described as heavy pressure, 6/10, with radiation to left arm associated with Palpitations. Patient follows with a cardiologist in [place] and was told if this occurs he should take an extra dose of his medications so around 2am he took an extra dose of Multaq (Extra 400mg) and atenolol(extra 50mg)  and took his morning dose. However, he came to the ED today because his is still having the CP and palpitations. He also noticed that he has CP with exertion which is new for him. He normally walks a lot but has needed to stop to rest while walking up the stairs due to CP. No LE swelling. No nausea, vomiting, abdominal pain, fevers or chills.    IN ED: Pt was hypotensive to 90's with improvement to 100's  initially 90s, bradycardic to  HR 50's   Labs: Trop: 0.04 x 2, EKG: Afib, no ST changes      (08 Aug 2021 17:18)      SUBJ:  Patient seen and examined. Recovering post-op. No chest pain. had an episode of diarrhea.      MEDICATIONS  (STANDING):  acetaminophen  IVPB .. 1000 milliGRAM(s) IV Intermittent once  apixaban 5 milliGRAM(s) Oral every 12 hours  aspirin enteric coated 81 milliGRAM(s) Oral daily  chlorhexidine 4% Liquid 1 Application(s) Topical <User Schedule>  dextrose 40% Gel 15 Gram(s) Oral once  dextrose 5%. 1000 milliLiter(s) (50 mL/Hr) IV Continuous <Continuous>  dextrose 5%. 1000 milliLiter(s) (100 mL/Hr) IV Continuous <Continuous>  dextrose 50% Injectable 50 milliLiter(s) IV Push every 15 minutes  dextrose 50% Injectable 25 milliLiter(s) IV Push every 15 minutes  glucagon  Injectable 1 milliGRAM(s) IntraMuscular once  insulin lispro (ADMELOG) corrective regimen sliding scale   SubCutaneous three times a day before meals  metoprolol tartrate 25 milliGRAM(s) Oral every 8 hours  pantoprazole    Tablet 40 milliGRAM(s) Oral before breakfast  sodium chloride 0.9%. 1000 milliLiter(s) (20 mL/Hr) IV Continuous <Continuous>  tamsulosin 0.4 milliGRAM(s) Oral at bedtime    MEDICATIONS  (PRN):  acetaminophen   Tablet .. 650 milliGRAM(s) Oral every 6 hours PRN Temp greater or equal to 38C (100.4F), Mild Pain (1 - 3)  ketorolac   Injectable 15 milliGRAM(s) IV Push every 4 hours PRN Moderate Pain (4 - 6)  oxyCODONE    IR 10 milliGRAM(s) Oral every 4 hours PRN Severe Pain (7 - 10)  oxyCODONE    IR 5 milliGRAM(s) Oral every 6 hours PRN Moderate Pain (4 - 6)            Vital Signs Last 24 Hrs  T(C): 36.8 (18 Aug 2021 04:00), Max: 37.2 (17 Aug 2021 20:00)  T(F): 98.3 (18 Aug 2021 04:00), Max: 98.9 (17 Aug 2021 20:00)  HR: 64 (18 Aug 2021 08:00) (57 - 69)  BP: 125/58 (18 Aug 2021 08:00) (107/53 - 156/71)  BP(mean): 83 (18 Aug 2021 08:00) (76 - 103)  RR: 37 (18 Aug 2021 08:00) (15 - 37)  SpO2: 97% (18 Aug 2021 08:00) (94% - 100%)      PHYSICAL EXAM:    GEN: AAO x 3, NAD  HEENT: NC/AT, PERRL  Neck: No JVD, no bruits  CV: Reg, S1-S2, no murmur  Lungs: CTAB  Abd: Soft, non-tender  Ext: No edema        08-17-21 @ 07:01  -  08-18-21 @ 07:00  --------------------------------------------------------  IN: 1440 mL / OUT: 1425 mL / NET: 15 mL    08-18-21 @ 07:01  -  08-18-21 @ 09:30  --------------------------------------------------------  IN: 240 mL / OUT: 0 mL / NET: 240 mL        I&O's Summary    17 Aug 2021 07:01  -  18 Aug 2021 07:00  --------------------------------------------------------  IN: 1440 mL / OUT: 1425 mL / NET: 15 mL    18 Aug 2021 07:01  -  18 Aug 2021 09:30  --------------------------------------------------------  IN: 240 mL / OUT: 0 mL / NET: 240 mL    	    TELEMETRY:    ECG:    TTE:      LABS:                        9.8    10.90 )-----------( 233      ( 18 Aug 2021 02:40 )             28.5     08-18    136  |  101  |  21<H>  ----------------------------<  124<H>  4.5   |  24  |  0.9    Ca    8.5      18 Aug 2021 02:40  Mg     1.9     08-18    TPro  5.6<L>  /  Alb  3.6  /  TBili  0.7  /  DBili  x   /  AST  50<H>  /  ALT  82<H>  /  AlkPhos  104  08-18              BNP  RADIOLOGY & ADDITIONAL STUDIES:      IMPRESSION AND PLAN:

## 2021-08-18 NOTE — PROGRESS NOTE ADULT - SUBJECTIVE AND OBJECTIVE BOX
OPERATIVE PROCEDURE(s):     cabg x 4/ left atrial clip           POD # 6    SURGEON(s): joselo    SUBJECTIVE ASSESSMENT: pt is complaining of incisional chest pain that responds to pain meds and of having some diarrhea overnight.  The patient doesn't want to go home today via  phone    Vital Signs Last 24 Hrs  T(C): 36.8 (18 Aug 2021 04:00), Max: 37.2 (17 Aug 2021 20:00)  T(F): 98.3 (18 Aug 2021 04:00), Max: 98.9 (17 Aug 2021 20:00)  HR: 59 (18 Aug 2021 14:00) (57 - 72)  BP: 140/64 (18 Aug 2021 10:48) (107/53 - 156/71)  BP(mean): 92 (18 Aug 2021 10:48) (76 - 103)  RR: 17 (18 Aug 2021 14:00) (15 - 37)  SpO2: 95% (18 Aug 2021 14:00) (94% - 100%)  08-17-21 @ 07:01  -  08-18-21 @ 07:00  --------------------------------------------------------  IN: 1440 mL / OUT: 1425 mL / NET: 15 mL    08-18-21 @ 07:01  -  08-18-21 @ 15:30  --------------------------------------------------------  IN: 240 mL / OUT: 0 mL / NET: 240 mL    Physical Exam  General: alert and oriented x 3  Chest: cta bl  CVS: s1s2  Abd: pos bs soft nt  GI/ :  Ext: no sig edema  incisions- c/d/i  sternum- intact     Central Venous Catheter: Yes[ ]  No[x ] , If Yes indication:           Day #    Vazquez Catheter: Yes  [ ] , No [x ] : If yes indication:                         Day #    NGT: Yes [ ] No [x  ]     If Yes Placement:                                          Day #    Post-Op Beta-Blockers: Yes [ x], No[ ], If No, then contraindication:    CHEST TUBE:  [ ] YES [x ] NO  OUTPUT:     per 24 hours    AIR LEAKS:  [ ] YES [ ] NO      EPICARDIAL WIRES:  [x ] YES [ ] NO      BOWEL MOVEMENT:  [x ] YES [ ] NO      LABS:                        9.8    10.90 )-----------( 233      ( 18 Aug 2021 02:40 )             28.5     COUMADIN:   [ ] YES [x ] NO      08-18    136  |  101  |  21<H>  ----------------------------<  124<H>  4.5   |  24  |  0.9    Ca    8.5      18 Aug 2021 02:40  Mg     1.9     08-18    TPro  5.6<L>  /  Alb  3.6  /  TBili  0.7  /  DBili  x   /  AST  50<H>  /  ALT  82<H>  /  AlkPhos  104  08-18    MEDICATIONS  (STANDING):  acetaminophen  IVPB .. 1000 milliGRAM(s) IV Intermittent once  apixaban 5 milliGRAM(s) Oral every 12 hours  aspirin enteric coated 81 milliGRAM(s) Oral daily  chlorhexidine 4% Liquid 1 Application(s) Topical <User Schedule>  dextrose 40% Gel 15 Gram(s) Oral once  dextrose 5%. 1000 milliLiter(s) (50 mL/Hr) IV Continuous <Continuous>  dextrose 5%. 1000 milliLiter(s) (100 mL/Hr) IV Continuous <Continuous>  dextrose 50% Injectable 50 milliLiter(s) IV Push every 15 minutes  dextrose 50% Injectable 25 milliLiter(s) IV Push every 15 minutes  glucagon  Injectable 1 milliGRAM(s) IntraMuscular once  insulin lispro (ADMELOG) corrective regimen sliding scale   SubCutaneous three times a day before meals  metoprolol tartrate 25 milliGRAM(s) Oral every 8 hours  pantoprazole    Tablet 40 milliGRAM(s) Oral before breakfast  sodium chloride 0.9%. 1000 milliLiter(s) (20 mL/Hr) IV Continuous <Continuous>  tamsulosin 0.4 milliGRAM(s) Oral at bedtime    MEDICATIONS  (PRN):  acetaminophen   Tablet .. 650 milliGRAM(s) Oral every 6 hours PRN Temp greater or equal to 38C (100.4F), Mild Pain (1 - 3)  ketorolac   Injectable 15 milliGRAM(s) IV Push every 4 hours PRN Moderate Pain (4 - 6)  oxyCODONE    IR 10 milliGRAM(s) Oral every 4 hours PRN Severe Pain (7 - 10)  oxyCODONE    IR 5 milliGRAM(s) Oral every 6 hours PRN Moderate Pain (4 - 6)      Allergies    No Known Allergies    Intolerances    Ambulation/Activity Status:  amb well with pt      RADIOLOGY & ADDITIONAL TESTS:  EXAM:  XR CHEST PA LAT 2V            PROCEDURE DATE:  08/18/2021            INTERPRETATION:  Clinical History / Reason for exam: Post CABG    Comparison : Chest radiograph August 17, 2021.    Technique/Positioning: PA and lateral chest radiographs.    Findings:    Support devices: None.    Cardiac/mediastinum/hilum: Unchanged.    Lung parenchyma/Pleura: Unchanged bilateral pleural effusions and bibasilar opacities/atelectasis. No pneumothorax.    Skeleton/soft tissues: Unchanged.    Impression:    Unchanged bilateral pleural effusions and bibasilar opacities/atelectasis.    --- End of Report ---    Assessment/Plan: Patient is a 80 yo male s/p cabg x 4 / atrial clip pod # 6  cont present tx as per ct surgeon  s/p cabg- cont asa, and b blocker; d/c'ed statin due to rise in lft's  a. fib- cont anticoagulation with eliquis; will hold off on amio  being the patient has inc in lft's  d/c home tomorrow  cut pacing wires      Social Service Disposition:  home tomorrow

## 2021-08-18 NOTE — PROGRESS NOTE ADULT - ASSESSMENT
S/p CABG for severe CAD  Recovering well  PAF - episodes of AF with RVR - converted with Amio      C/w medical therapy  Switch Amio to PO - load 5 gm, then 200 mg qD for 2-3 months. Hold Multaq  glycemic control  C/w ASA, Eliquis  Statin  C/w BB  Add ACE-I or ARB if tolerated  post-op care as per CTS/CCM  evaluation for diarrhea as per primary team  Ambulate as tolerated  PT/rehab  If stable - d/c planning.  outpatient follow-up with Dr. Armand Tapia

## 2021-08-19 ENCOUNTER — TRANSCRIPTION ENCOUNTER (OUTPATIENT)
Age: 81
End: 2021-08-19

## 2021-08-19 VITALS
DIASTOLIC BLOOD PRESSURE: 68 MMHG | HEART RATE: 77 BPM | RESPIRATION RATE: 26 BRPM | TEMPERATURE: 98 F | SYSTOLIC BLOOD PRESSURE: 153 MMHG | OXYGEN SATURATION: 97 %

## 2021-08-19 LAB
ALBUMIN SERPL ELPH-MCNC: 3.4 G/DL — LOW (ref 3.5–5.2)
ALP SERPL-CCNC: 104 U/L — SIGNIFICANT CHANGE UP (ref 30–115)
ALT FLD-CCNC: 88 U/L — HIGH (ref 0–41)
ANION GAP SERPL CALC-SCNC: 9 MMOL/L — SIGNIFICANT CHANGE UP (ref 7–14)
AST SERPL-CCNC: 48 U/L — HIGH (ref 0–41)
BASOPHILS # BLD AUTO: 0.03 K/UL — SIGNIFICANT CHANGE UP (ref 0–0.2)
BASOPHILS NFR BLD AUTO: 0.3 % — SIGNIFICANT CHANGE UP (ref 0–1)
BILIRUB SERPL-MCNC: 0.6 MG/DL — SIGNIFICANT CHANGE UP (ref 0.2–1.2)
BUN SERPL-MCNC: 17 MG/DL — SIGNIFICANT CHANGE UP (ref 10–20)
CALCIUM SERPL-MCNC: 8.5 MG/DL — SIGNIFICANT CHANGE UP (ref 8.5–10.1)
CHLORIDE SERPL-SCNC: 101 MMOL/L — SIGNIFICANT CHANGE UP (ref 98–110)
CO2 SERPL-SCNC: 24 MMOL/L — SIGNIFICANT CHANGE UP (ref 17–32)
CREAT SERPL-MCNC: 0.8 MG/DL — SIGNIFICANT CHANGE UP (ref 0.7–1.5)
EOSINOPHIL # BLD AUTO: 0.16 K/UL — SIGNIFICANT CHANGE UP (ref 0–0.7)
EOSINOPHIL NFR BLD AUTO: 1.5 % — SIGNIFICANT CHANGE UP (ref 0–8)
GLUCOSE BLDC GLUCOMTR-MCNC: 104 MG/DL — HIGH (ref 70–99)
GLUCOSE BLDC GLUCOMTR-MCNC: 137 MG/DL — HIGH (ref 70–99)
GLUCOSE SERPL-MCNC: 121 MG/DL — HIGH (ref 70–99)
HCT VFR BLD CALC: 29.2 % — LOW (ref 42–52)
HGB BLD-MCNC: 10.1 G/DL — LOW (ref 14–18)
IMM GRANULOCYTES NFR BLD AUTO: 5.9 % — HIGH (ref 0.1–0.3)
LYMPHOCYTES # BLD AUTO: 1.75 K/UL — SIGNIFICANT CHANGE UP (ref 1.2–3.4)
LYMPHOCYTES # BLD AUTO: 16.1 % — LOW (ref 20.5–51.1)
MAGNESIUM SERPL-MCNC: 1.8 MG/DL — SIGNIFICANT CHANGE UP (ref 1.8–2.4)
MCHC RBC-ENTMCNC: 32.1 PG — HIGH (ref 27–31)
MCHC RBC-ENTMCNC: 34.6 G/DL — SIGNIFICANT CHANGE UP (ref 32–37)
MCV RBC AUTO: 92.7 FL — SIGNIFICANT CHANGE UP (ref 80–94)
MONOCYTES # BLD AUTO: 1.3 K/UL — HIGH (ref 0.1–0.6)
MONOCYTES NFR BLD AUTO: 11.9 % — HIGH (ref 1.7–9.3)
NEUTROPHILS # BLD AUTO: 7.01 K/UL — HIGH (ref 1.4–6.5)
NEUTROPHILS NFR BLD AUTO: 64.3 % — SIGNIFICANT CHANGE UP (ref 42.2–75.2)
NRBC # BLD: 0 /100 WBCS — SIGNIFICANT CHANGE UP (ref 0–0)
PLATELET # BLD AUTO: 290 K/UL — SIGNIFICANT CHANGE UP (ref 130–400)
POTASSIUM SERPL-MCNC: 4.4 MMOL/L — SIGNIFICANT CHANGE UP (ref 3.5–5)
POTASSIUM SERPL-SCNC: 4.4 MMOL/L — SIGNIFICANT CHANGE UP (ref 3.5–5)
PROT SERPL-MCNC: 5.4 G/DL — LOW (ref 6–8)
RBC # BLD: 3.15 M/UL — LOW (ref 4.7–6.1)
RBC # FLD: 12.1 % — SIGNIFICANT CHANGE UP (ref 11.5–14.5)
SODIUM SERPL-SCNC: 134 MMOL/L — LOW (ref 135–146)
WBC # BLD: 10.89 K/UL — HIGH (ref 4.8–10.8)
WBC # FLD AUTO: 10.89 K/UL — HIGH (ref 4.8–10.8)

## 2021-08-19 PROCEDURE — 93010 ELECTROCARDIOGRAM REPORT: CPT

## 2021-08-19 PROCEDURE — 99232 SBSQ HOSP IP/OBS MODERATE 35: CPT

## 2021-08-19 RX ORDER — METOPROLOL TARTRATE 50 MG
1 TABLET ORAL
Qty: 90 | Refills: 0
Start: 2021-08-19 | End: 2021-09-17

## 2021-08-19 RX ORDER — AMLODIPINE BESYLATE 2.5 MG/1
1 TABLET ORAL
Qty: 0 | Refills: 0 | DISCHARGE

## 2021-08-19 RX ORDER — APIXABAN 2.5 MG/1
1 TABLET, FILM COATED ORAL
Qty: 60 | Refills: 0
Start: 2021-08-19

## 2021-08-19 RX ORDER — ASPIRIN/CALCIUM CARB/MAGNESIUM 324 MG
1 TABLET ORAL
Qty: 30 | Refills: 0
Start: 2021-08-19

## 2021-08-19 RX ORDER — DRONEDARONE 400 MG/1
1 TABLET, FILM COATED ORAL
Qty: 0 | Refills: 0 | DISCHARGE

## 2021-08-19 RX ORDER — METOPROLOL TARTRATE 50 MG
1 TABLET ORAL
Qty: 90 | Refills: 0
Start: 2021-08-19

## 2021-08-19 RX ORDER — TAMSULOSIN HYDROCHLORIDE 0.4 MG/1
1 CAPSULE ORAL
Qty: 30 | Refills: 0
Start: 2021-08-19

## 2021-08-19 RX ORDER — ATORVASTATIN CALCIUM 80 MG/1
1 TABLET, FILM COATED ORAL
Qty: 0 | Refills: 0 | DISCHARGE

## 2021-08-19 RX ORDER — PANTOPRAZOLE SODIUM 20 MG/1
1 TABLET, DELAYED RELEASE ORAL
Qty: 30 | Refills: 0
Start: 2021-08-19

## 2021-08-19 RX ORDER — ATENOLOL 25 MG/1
1 TABLET ORAL
Qty: 0 | Refills: 0 | DISCHARGE

## 2021-08-19 RX ORDER — APIXABAN 2.5 MG/1
1 TABLET, FILM COATED ORAL
Qty: 0 | Refills: 0 | DISCHARGE

## 2021-08-19 RX ADMIN — Medication 25 MILLIGRAM(S): at 06:45

## 2021-08-19 RX ADMIN — Medication 81 MILLIGRAM(S): at 12:12

## 2021-08-19 RX ADMIN — Medication 25 MILLIGRAM(S): at 13:28

## 2021-08-19 RX ADMIN — PANTOPRAZOLE SODIUM 40 MILLIGRAM(S): 20 TABLET, DELAYED RELEASE ORAL at 06:45

## 2021-08-19 RX ADMIN — CHLORHEXIDINE GLUCONATE 1 APPLICATION(S): 213 SOLUTION TOPICAL at 06:46

## 2021-08-19 NOTE — DISCHARGE NOTE NURSING/CASE MANAGEMENT/SOCIAL WORK - NSDCPEFALRISK_GEN_ALL_CORE
For information on Fall & injury Prevention, visit [email or web address]

## 2021-08-19 NOTE — PROGRESS NOTE ADULT - SUBJECTIVE AND OBJECTIVE BOX
Patient is a 81y old  Male who presents with a chief complaint of Chest pain (19 Aug 2021 07:32)    HPI:  Patient is a 80 yo M hx of CAD s/p PCI x 3 last 2010, Afib on Eliquis and HLD presented with cc of chest pain. Chest pain started  last night around 11pm, described as heavy pressure, 6/10, with radiation to left arm associated with Palpitations. Patient follows with a cardiologist in [place] and was told if this occurs he should take an extra dose of his medications so around 2am he took an extra dose of Multaq (Extra 400mg) and atenolol(extra 50mg)  and took his morning dose. However, he came to the ED today because his is still having the CP and palpitations. He also noticed that he has CP with exertion which is new for him. He normally walks a lot but has needed to stop to rest while walking up the stairs due to CP. No LE swelling. No nausea, vomiting, abdominal pain, fevers or chills.    IN ED: Pt was hypotensive to 90's with improvement to 100's  initially 90s, bradycardic to  HR 50's   Labs: Trop: 0.04 x 2, EKG: Afib, no ST changes      (08 Aug 2021 17:18)      SUBJ:  Patient seen and examined. Recovering post-op. No chest pain. Pacing wires will be d/c today.      MEDICATIONS  (STANDING):  acetaminophen  IVPB .. 1000 milliGRAM(s) IV Intermittent once  apixaban 5 milliGRAM(s) Oral every 12 hours  aspirin enteric coated 81 milliGRAM(s) Oral daily  chlorhexidine 4% Liquid 1 Application(s) Topical <User Schedule>  dextrose 40% Gel 15 Gram(s) Oral once  dextrose 50% Injectable 50 milliLiter(s) IV Push every 15 minutes  dextrose 50% Injectable 25 milliLiter(s) IV Push every 15 minutes  glucagon  Injectable 1 milliGRAM(s) IntraMuscular once  insulin lispro (ADMELOG) corrective regimen sliding scale   SubCutaneous three times a day before meals  metoprolol tartrate 25 milliGRAM(s) Oral every 8 hours  pantoprazole    Tablet 40 milliGRAM(s) Oral before breakfast  tamsulosin 0.4 milliGRAM(s) Oral at bedtime    MEDICATIONS  (PRN):  acetaminophen   Tablet .. 650 milliGRAM(s) Oral every 6 hours PRN Temp greater or equal to 38C (100.4F), Mild Pain (1 - 3)  oxyCODONE    IR 10 milliGRAM(s) Oral every 4 hours PRN Severe Pain (7 - 10)  oxyCODONE    IR 5 milliGRAM(s) Oral every 6 hours PRN Moderate Pain (4 - 6)            Vital Signs Last 24 Hrs  T(C): 36.9 (19 Aug 2021 08:00), Max: 36.9 (18 Aug 2021 20:00)  T(F): 98.5 (19 Aug 2021 08:00), Max: 98.5 (18 Aug 2021 20:00)  HR: 80 (19 Aug 2021 10:00) (59 - 80)  BP: 141/63 (19 Aug 2021 10:00) (110/58 - 156/70)  BP(mean): 91 (19 Aug 2021 10:00) (79 - 103)  RR: 22 (19 Aug 2021 10:00) (15 - 34)  SpO2: 96% (19 Aug 2021 10:00) (94% - 99%)      PHYSICAL EXAM:    GEN: AAO x 3, NAD  HEENT: NC/AT, PERRL  Neck: No JVD, no bruits  CV: Reg, S1-S2, no murmur  Lungs: CTAB  Abd: Soft, non-tender  Ext: No edema        08-18-21 @ 07:01  -  08-19-21 @ 07:00  --------------------------------------------------------  IN: 840 mL / OUT: 2050 mL / NET: -1210 mL        I&O's Summary    18 Aug 2021 07:01  -  19 Aug 2021 07:00  --------------------------------------------------------  IN: 840 mL / OUT: 2050 mL / NET: -1210 mL    	    TELEMETRY:    ECG:    TTE:      LABS:                        10.1   10.89 )-----------( 290      ( 19 Aug 2021 01:40 )             29.2     08-19    134<L>  |  101  |  17  ----------------------------<  121<H>  4.4   |  24  |  0.8    Ca    8.5      19 Aug 2021 01:40  Mg     1.8     08-19    TPro  5.4<L>  /  Alb  3.4<L>  /  TBili  0.6  /  DBili  x   /  AST  48<H>  /  ALT  88<H>  /  AlkPhos  104  08-19              BNP  RADIOLOGY & ADDITIONAL STUDIES:      IMPRESSION AND PLAN:

## 2021-08-19 NOTE — PROGRESS NOTE ADULT - ASSESSMENT
S/p CABG for severe CAD  Recovering well  PAF - episodes of AF with RVR - converted with Amio      C/w medical therapy  Switch Amio to PO - 200 mg qD for 2-3 months. Hold Multaq for now  glycemic control  C/w ASA, Eliquis  Statin  C/w BB  Add ACE-I or ARB if tolerated  post-op care as per CTS/CCM  Ambulate as tolerated  PT/rehab  If stable - d/c planning for today.  outpatient follow-up with Dr. Toby Tapia

## 2021-08-19 NOTE — DISCHARGE NOTE NURSING/CASE MANAGEMENT/SOCIAL WORK - PATIENT PORTAL LINK FT
You can access the FollowMyHealth Patient Portal offered by [facility] by registering at the following website: [email or web address]. By joining [employer]’s FollowMyHealth portal, you will also be able to view your health information using other applications (apps) compatible with our system.

## 2021-08-19 NOTE — PROGRESS NOTE ADULT - SUBJECTIVE AND OBJECTIVE BOX
OPERATIVE PROCEDURE(s):                POD #                       SURGEON(s): ABHIJIT Smith  SUBJECTIVE ASSESSMENT:81yMale patient seen and examined at bedside.    Vital Signs Last 24 Hrs  T(F): 98.1 (19 Aug 2021 04:00), Max: 98.5 (18 Aug 2021 20:00)  HR: 67 (19 Aug 2021 06:00) (59 - 72)  BP: 147/70 (19 Aug 2021 06:00) (117/54 - 156/70)  BP(mean): 101 (19 Aug 2021 06:00) (78 - 102)  ABP: --  ABP(mean): --  RR: 20 (19 Aug 2021 06:00) (15 - 37)  SpO2: 96% (19 Aug 2021 06:00) (94% - 99%)  CVP(mm Hg): --  CVP(cm H2O): --  CO: --  CI: --  PA: --  SVR: --    I&O's Detail    18 Aug 2021 07:01  -  19 Aug 2021 07:00  --------------------------------------------------------  IN:    Oral Fluid: 840 mL  Total IN: 840 mL    OUT:    Voided (mL): 2050 mL  Total OUT: 2050 mL        Net: I&O's Detail    17 Aug 2021 07:01  -  18 Aug 2021 07:00  --------------------------------------------------------  Total NET: 15 mL      18 Aug 2021 07:01  -  19 Aug 2021 07:00  --------------------------------------------------------  Total NET: -1210 mL        CAPILLARY BLOOD GLUCOSE      POCT Blood Glucose.: 137 mg/dL (19 Aug 2021 06:50)  POCT Blood Glucose.: 127 mg/dL (18 Aug 2021 22:01)  POCT Blood Glucose.: 156 mg/dL (18 Aug 2021 16:39)  POCT Blood Glucose.: 146 mg/dL (18 Aug 2021 11:50)    A1C with Estimated Average Glucose Result: 5.6 % (08-10-21 @ 07:56)      Physical Exam:  General: NAD; A&Ox3  Cardiac: S1/S2, RRR, no murmur, no rubs  Lungs: unlabored respirations, CTA b/l, no wheeze, no rales, no crackles  Abdomen: Soft/NT/ND; positive bowel sounds x 4  Sternum: Intact, no click, incision healing well with no drainage  Incisions: Incisions clean/dry/intact  Extremities: No edema b/l lower extremities; good capillary refill; no cyanosis; palpable 1+ pedal pulses b/l    Central Venous Catheter: Yes[]  No[] , If Yes indication: intravenous access       Day #  Vazquez Catheter: Yes  [] , No  [] , If yes indication: monitor strict i/o's                     Day #  OGT: Yes [] No [] ,    If Yes Placement:                                                   Day #  EPICARDIAL WIRES:  [] YES [] NO                                                            Day #  BOWEL MOVEMENT:  [] YES [] NO, If No, Timing since last BM Day #  CHEST TUBE(MS/Left/Right):  [] YES [] NO, If yes -  AIR LEAKS:  [] YES [] NO        LABS:                        10.1<L>  10.89<H> )-----------( 290      ( 19 Aug 2021 01:40 )             29.2<L>                        9.8<L>  10.90<H> )-----------( 233      ( 18 Aug 2021 02:40 )             28.5<L>    08-19    134<L>  |  101  |  17  ----------------------------<  121<H>  4.4   |  24  |  0.8  08-18    136  |  101  |  21<H>  ----------------------------<  124<H>  4.5   |  24  |  0.9    Ca    8.5      19 Aug 2021 01:40  Mg     1.8     08-19    TPro  5.4<L> [6.0 - 8.0]  /  Alb  3.4<L> [3.5 - 5.2]  /  TBili  0.6 [0.2 - 1.2]  /  DBili  x   /  AST  48<H> [0 - 41]  /  ALT  88<H> [0 - 41]  /  AlkPhos  104 [30 - 115]  08-19          RADIOLOGY & ADDITIONAL TESTS:  CXR:   EKG:  Allergies    No Known Allergies    Intolerances      MEDICATIONS  (STANDING):  acetaminophen  IVPB .. 1000 milliGRAM(s) IV Intermittent once  apixaban 5 milliGRAM(s) Oral every 12 hours  aspirin enteric coated 81 milliGRAM(s) Oral daily  chlorhexidine 4% Liquid 1 Application(s) Topical <User Schedule>  dextrose 40% Gel 15 Gram(s) Oral once  dextrose 5%. 1000 milliLiter(s) (50 mL/Hr) IV Continuous <Continuous>  dextrose 5%. 1000 milliLiter(s) (100 mL/Hr) IV Continuous <Continuous>  dextrose 50% Injectable 50 milliLiter(s) IV Push every 15 minutes  dextrose 50% Injectable 25 milliLiter(s) IV Push every 15 minutes  glucagon  Injectable 1 milliGRAM(s) IntraMuscular once  insulin lispro (ADMELOG) corrective regimen sliding scale   SubCutaneous three times a day before meals  metoprolol tartrate 25 milliGRAM(s) Oral every 8 hours  pantoprazole    Tablet 40 milliGRAM(s) Oral before breakfast  sodium chloride 0.9%. 1000 milliLiter(s) (20 mL/Hr) IV Continuous <Continuous>  tamsulosin 0.4 milliGRAM(s) Oral at bedtime    MEDICATIONS  (PRN):  acetaminophen   Tablet .. 650 milliGRAM(s) Oral every 6 hours PRN Temp greater or equal to 38C (100.4F), Mild Pain (1 - 3)  oxyCODONE    IR 10 milliGRAM(s) Oral every 4 hours PRN Severe Pain (7 - 10)  oxyCODONE    IR 5 milliGRAM(s) Oral every 6 hours PRN Moderate Pain (4 - 6)    Home Medications:  amLODIPine 10 mg oral tablet: 1 tab(s) orally once a day (08 Aug 2021 17:45)  atenolol 50 mg oral tablet: 1 tab(s) orally once a day (08 Aug 2021 17:45)  Eliquis 5 mg oral tablet: 1 tab(s) orally 2 times a day (08 Aug 2021 17:45)  Lipitor 40 mg oral tablet: 1 tab(s) orally once a day (08 Aug 2021 17:45)  Multaq 400 mg oral tablet: 1 tab(s) orally 2 times a day (08 Aug 2021 17:44)    Pharmacologic DVT Prophylaxis [] YES, [] NO: Contraindication:  SCD's: YES b/l    GI Prophylaxis:     Post-Op Beta-Blockers: [] Yes, [] No: contraindication:  Post-Op CCB: [] Yes, [] No: contraindication:  Post-Op Aspirin:  [] Yes, [] No: contraindication:  Post-Op Statin:  [] Yes, [] No: contraindication:    Ambulation/Activity Status:    Assessment/Plan:  81y Male status-post  - Case and plan discussed with CTU Intensivist and CT Surgeon - Dr. Valero/Luis/Marie  - Continue CTU supportive care and ongoing plan of care as per continuing CTU rounds.   - Continue DVT/GI prophylaxis  - Incentive Spirometry 10 times an hour  - Continue to advance physical activity as tolerated and continue PT/OT as directed  1. CAD: Continue ASA, statin, BB  2. HTN:   3. A. Fib:   4. COPD/Hypoxia:   5. DM/Glucose Control:     Social Service Disposition:     OPERATIVE PROCEDURE(s):  CABGx4 with Left atrial clip              POD # 7                      SURGEON(s): ABHIJIT Smith  SUBJECTIVE ASSESSMENT:81yMale patient seen and examined at bedside.    Vital Signs Last 24 Hrs  T(F): 98.1 (19 Aug 2021 04:00), Max: 98.5 (18 Aug 2021 20:00)  HR: 67 (19 Aug 2021 06:00) (59 - 72)  BP: 147/70 (19 Aug 2021 06:00) (117/54 - 156/70)  BP(mean): 101 (19 Aug 2021 06:00) (78 - 102)  RR: 20 (19 Aug 2021 06:00) (15 - 37)  SpO2: 96% (19 Aug 2021 06:00) (94% - 99%)      I&O's Detail    18 Aug 2021 07:01  -  19 Aug 2021 07:00  --------------------------------------------------------  IN:    Oral Fluid: 840 mL  Total IN: 840 mL    OUT:    Voided (mL): 2050 mL  Total OUT: 2050 mL        Net: I&O's Detail    17 Aug 2021 07:01  -  18 Aug 2021 07:00  --------------------------------------------------------  Total NET: 15 mL      18 Aug 2021 07:01  -  19 Aug 2021 07:00  --------------------------------------------------------  Total NET: -1210 mL        CAPILLARY BLOOD GLUCOSE      POCT Blood Glucose.: 137 mg/dL (19 Aug 2021 06:50)  POCT Blood Glucose.: 127 mg/dL (18 Aug 2021 22:01)  POCT Blood Glucose.: 156 mg/dL (18 Aug 2021 16:39)  POCT Blood Glucose.: 146 mg/dL (18 Aug 2021 11:50)    A1C with Estimated Average Glucose Result: 5.6 % (08-10-21 @ 07:56)      Physical Exam:  General: NAD; A&Ox3  Cardiac: S1/S2, RRR, no murmur, no rubs  Lungs: unlabored respirations, CTA b/l, no wheeze, no rales, no crackles  Abdomen: Soft/NT/ND; positive bowel sounds x 4  Sternum: Intact, no click, incision healing well with no drainage  Incisions: Incisions clean/dry/intact  Extremities: No edema b/l lower extremities; good capillary refill; no cyanosis; palpable 1+ pedal pulses b/l    Central Venous Catheter: Yes[]  No[x] , If Yes indication: intravenous access       Day #  Vazquez Catheter: Yes  [] , No  [x] , If yes indication: monitor strict i/o's                     Day #  OGT: Yes [] No [x] ,    If Yes Placement:                                                   Day #  EPICARDIAL WIRES:  [] YES [x] NO                                                            Day #  BOWEL MOVEMENT:  [] YES [] NO, If No, Timing since last BM Day #  CHEST TUBE(MS/Left/Right):  [] YES [x] NO, If yes -  AIR LEAKS:  [] YES [] NO        LABS:                        10.1<L>  10.89<H> )-----------( 290      ( 19 Aug 2021 01:40 )             29.2<L>                        9.8<L>  10.90<H> )-----------( 233      ( 18 Aug 2021 02:40 )             28.5<L>    08-19    134<L>  |  101  |  17  ----------------------------<  121<H>  4.4   |  24  |  0.8  08-18    136  |  101  |  21<H>  ----------------------------<  124<H>  4.5   |  24  |  0.9    Ca    8.5      19 Aug 2021 01:40  Mg     1.8     08-19    TPro  5.4<L> [6.0 - 8.0]  /  Alb  3.4<L> [3.5 - 5.2]  /  TBili  0.6 [0.2 - 1.2]  /  DBili  x   /  AST  48<H> [0 - 41]  /  ALT  88<H> [0 - 41]  /  AlkPhos  104 [30 - 115]  08-19          RADIOLOGY & ADDITIONAL TESTS:  CXR:   EKG:  Allergies    No Known Allergies    Intolerances      MEDICATIONS  (STANDING):  acetaminophen  IVPB .. 1000 milliGRAM(s) IV Intermittent once  apixaban 5 milliGRAM(s) Oral every 12 hours  aspirin enteric coated 81 milliGRAM(s) Oral daily  chlorhexidine 4% Liquid 1 Application(s) Topical <User Schedule>  dextrose 40% Gel 15 Gram(s) Oral once  dextrose 5%. 1000 milliLiter(s) (50 mL/Hr) IV Continuous <Continuous>  dextrose 5%. 1000 milliLiter(s) (100 mL/Hr) IV Continuous <Continuous>  dextrose 50% Injectable 50 milliLiter(s) IV Push every 15 minutes  dextrose 50% Injectable 25 milliLiter(s) IV Push every 15 minutes  glucagon  Injectable 1 milliGRAM(s) IntraMuscular once  insulin lispro (ADMELOG) corrective regimen sliding scale   SubCutaneous three times a day before meals  metoprolol tartrate 25 milliGRAM(s) Oral every 8 hours  pantoprazole    Tablet 40 milliGRAM(s) Oral before breakfast  sodium chloride 0.9%. 1000 milliLiter(s) (20 mL/Hr) IV Continuous <Continuous>  tamsulosin 0.4 milliGRAM(s) Oral at bedtime    MEDICATIONS  (PRN):  acetaminophen   Tablet .. 650 milliGRAM(s) Oral every 6 hours PRN Temp greater or equal to 38C (100.4F), Mild Pain (1 - 3)  oxyCODONE    IR 10 milliGRAM(s) Oral every 4 hours PRN Severe Pain (7 - 10)  oxyCODONE    IR 5 milliGRAM(s) Oral every 6 hours PRN Moderate Pain (4 - 6)    Home Medications:  amLODIPine 10 mg oral tablet: 1 tab(s) orally once a day (08 Aug 2021 17:45)  atenolol 50 mg oral tablet: 1 tab(s) orally once a day (08 Aug 2021 17:45)  Eliquis 5 mg oral tablet: 1 tab(s) orally 2 times a day (08 Aug 2021 17:45)  Lipitor 40 mg oral tablet: 1 tab(s) orally once a day (08 Aug 2021 17:45)  Multaq 400 mg oral tablet: 1 tab(s) orally 2 times a day (08 Aug 2021 17:44)    Pharmacologic DVT Prophylaxis [x] YES, Apixaban 5mg po q12h  SCD's: YES b/l    GI Prophylaxis: pantoprazole 40mg po    Post-Op Beta-Blockers: [x] Yes, [] No: contraindication:  Post-Op CCB: [] Yes, [x] No: contraindication:  Post-Op Aspirin:  [x] Yes, [] No: contraindication:  Post-Op Statin:  [] Yes, [x] No: contraindication: d/t elevated LFTs    Ambulation/Activity Status: ambulate as tolerated    Assessment/Plan:  81y Male status-post CABGx4 with Left atrial clip POD#7  - Case and plan discussed with CTU Intensivist and CT Surgeon - Dr. Salazar  - Continue CTU supportive care and ongoing plan of care as per continuing CTU rounds.   - Continue DVT/GI prophylaxis  - Incentive Spirometry 10 times an hour  - Continue to advance physical activity as tolerated and continue PT/OT as directed  1. CAD: Continue ASA, BB, hold statin d/t increase in LFTs  2. HTN: monitor  3. A. Fib: metoprolol tartrate 25mg po q8h  4. COPD/Hypoxia: saturating ____% on room air  5. DM/Glucose Control:     Social Service Disposition:  home today   OPERATIVE PROCEDURE(s):  CABGx4 with Left atrial clip              POD # 7                      SURGEON(s): ABHIJIT Smith  SUBJECTIVE ASSESSMENT: 81y Male patient seen and examined at bedside and is without complaints. He is eager to go home today.   Vital Signs Last 24 Hrs  T(F): 98.1 (19 Aug 2021 04:00), Max: 98.5 (18 Aug 2021 20:00)  HR: 67 (19 Aug 2021 06:00) (59 - 72)  BP: 147/70 (19 Aug 2021 06:00) (117/54 - 156/70)  BP(mean): 101 (19 Aug 2021 06:00) (78 - 102)  RR: 20 (19 Aug 2021 06:00) (15 - 37)  SpO2: 96% (19 Aug 2021 06:00) (94% - 99%)      I&O's Detail    18 Aug 2021 07:01  -  19 Aug 2021 07:00  --------------------------------------------------------  IN:    Oral Fluid: 840 mL  Total IN: 840 mL    OUT:    Voided (mL): 2050 mL  Total OUT: 2050 mL    Net: I&O's Detail    17 Aug 2021 07:01  -  18 Aug 2021 07:00  --------------------------------------------------------  Total NET: 15 mL      18 Aug 2021 07:01  -  19 Aug 2021 07:00  --------------------------------------------------------  Total NET: -1210 mL    CAPILLARY BLOOD GLUCOSE    POCT Blood Glucose.: 137 mg/dL (19 Aug 2021 06:50)  POCT Blood Glucose.: 127 mg/dL (18 Aug 2021 22:01)  POCT Blood Glucose.: 156 mg/dL (18 Aug 2021 16:39)  POCT Blood Glucose.: 146 mg/dL (18 Aug 2021 11:50)    A1C with Estimated Average Glucose Result: 5.6 % (08-10-21 @ 07:56)    Physical Exam:  General: NAD; A&Ox3  Cardiac: S1/S2, RRR, no murmur, no rubs  Lungs: unlabored respirations, CTA b/l, no wheeze, no rales, no crackles  Abdomen: Soft/NT/ND; positive bowel sounds x 4  Sternum: Intact, no click, incision healing well with no drainage  Incisions: Incisions clean/dry/intact  Extremities: No edema b/l lower extremities; good capillary refill; no cyanosis; palpable 1+ pedal pulses b/l    Central Venous Catheter: Yes[]  No[x] , If Yes indication: intravenous access       Day #  Vazquez Catheter: Yes  [] , No  [x] , If yes indication: monitor strict i/o's                     Day #  OGT: Yes [] No [x] ,    If Yes Placement:                                                   Day #  EPICARDIAL WIRES:  [] YES [x] NO                                                            Day #  BOWEL MOVEMENT:  [x] YES [] NO, If No, Timing since last BM Day #  CHEST TUBE(MS/Left/Right):  [] YES [x] NO, If yes -  AIR LEAKS:  [] YES [] NO        LABS:                        10.1<L>  10.89<H> )-----------( 290      ( 19 Aug 2021 01:40 )             29.2<L>                        9.8<L>  10.90<H> )-----------( 233      ( 18 Aug 2021 02:40 )             28.5<L>  08-19    134<L>  |  101  |  17  ----------------------------<  121<H>  4.4   |  24  |  0.8  08-18    136  |  101  |  21<H>  ----------------------------<  124<H>  4.5   |  24  |  0.9    Ca    8.5      19 Aug 2021 01:40  Mg     1.8     08-19    TPro  5.4<L> [6.0 - 8.0]  /  Alb  3.4<L> [3.5 - 5.2]  /  TBili  0.6 [0.2 - 1.2]  /  DBili  x   /  AST  48<H> [0 - 41]  /  ALT  88<H> [0 - 41]  /  AlkPhos  104 [30 - 115]  08-19    RADIOLOGY & ADDITIONAL TESTS:  CXR: EXAM:  XR CHEST PA LAT 2V            PROCEDURE DATE:  08/18/2021      INTERPRETATION:  Clinical History / Reason for exam: Post CABG    Comparison : Chest radiograph August 17, 2021.    Technique/Positioning: PA and lateral chest radiographs.    Findings:    Support devices: None.    Cardiac/mediastinum/hilum: Unchanged.    Lung parenchyma/Pleura: Unchanged bilateral pleural effusions and bibasilar opacities/atelectasis. No pneumothorax.    Skeleton/soft tissues: Unchanged.    Impression:    Unchanged bilateral pleural effusions and bibasilar opacities/atelectasis.    EKG:   Ventricular Rate 62 BPM    Atrial Rate 62 BPM    P-R Interval 152 ms    QRS Duration 100 ms    Q-T Interval 466 ms    QTC Calculation(Bazett) 472 ms    P Axis 52 degrees    R Axis -1 degrees    T Axis 40 degrees    Diagnosis Line Normal sinus rhythm  Possible Left atrial enlargement  Inferior infarct , age undetermined  Abnormal ECG    Allergies    No Known Allergies    Intolerances    MEDICATIONS  (STANDING):  acetaminophen  IVPB .. 1000 milliGRAM(s) IV Intermittent once  apixaban 5 milliGRAM(s) Oral every 12 hours  aspirin enteric coated 81 milliGRAM(s) Oral daily  chlorhexidine 4% Liquid 1 Application(s) Topical <User Schedule>  dextrose 40% Gel 15 Gram(s) Oral once  dextrose 5%. 1000 milliLiter(s) (50 mL/Hr) IV Continuous <Continuous>  dextrose 5%. 1000 milliLiter(s) (100 mL/Hr) IV Continuous <Continuous>  dextrose 50% Injectable 50 milliLiter(s) IV Push every 15 minutes  dextrose 50% Injectable 25 milliLiter(s) IV Push every 15 minutes  glucagon  Injectable 1 milliGRAM(s) IntraMuscular once  insulin lispro (ADMELOG) corrective regimen sliding scale   SubCutaneous three times a day before meals  metoprolol tartrate 25 milliGRAM(s) Oral every 8 hours  pantoprazole    Tablet 40 milliGRAM(s) Oral before breakfast  tamsulosin 0.4 milliGRAM(s) Oral at bedtime    MEDICATIONS  (PRN):  acetaminophen   Tablet .. 650 milliGRAM(s) Oral every 6 hours PRN Temp greater or equal to 38C (100.4F), Mild Pain (1 - 3)  oxyCODONE    IR 10 milliGRAM(s) Oral every 4 hours PRN Severe Pain (7 - 10)  oxyCODONE    IR 5 milliGRAM(s) Oral every 6 hours PRN Moderate Pain (4 - 6)    Home Medications:  amLODIPine 10 mg oral tablet: 1 tab(s) orally once a day (08 Aug 2021 17:45)  atenolol 50 mg oral tablet: 1 tab(s) orally once a day (08 Aug 2021 17:45)  Eliquis 5 mg oral tablet: 1 tab(s) orally 2 times a day (08 Aug 2021 17:45)  Lipitor 40 mg oral tablet: 1 tab(s) orally once a day (08 Aug 2021 17:45)  Multaq 400 mg oral tablet: 1 tab(s) orally 2 times a day (08 Aug 2021 17:44)    Pharmacologic DVT Prophylaxis [x] YES, Apixaban 5mg po q12h  SCD's: YES b/l    GI Prophylaxis: pantoprazole 40mg po    Post-Op Beta-Blockers: [x] Yes, [] No: contraindication:  Post-Op CCB: [] Yes, [x] No: contraindication:  Post-Op Aspirin:  [x] Yes, [] No: contraindication:  Post-Op Statin:  [] Yes, [x] No: contraindication: d/t elevated LFTs    Ambulation/Activity Status: ambulate as tolerated    Assessment/Plan:  81y Male status-post CABGx4 with Left atrial clip POD#7  - Case and plan discussed with CTU Intensivist and CT Surgeon - Dr. Salazar  - Continue CTU supportive care and ongoing plan of care as per continuing CTU rounds.   - Continue DVT/GI prophylaxis  - Incentive Spirometry 10 times an hour  - Continue to advance physical activity as tolerated and continue PT/OT as directed  1. CAD: Continue ASA, BB, hold statin d/t increase in LFTs  2. HTN: cont lopressor  3. A. Fib: cont metoprolol tartrate 25mg po q8h for ratecontrol and anticoagulation with eliquis       Social Service Disposition:  home today with [facility] home care

## 2021-08-19 NOTE — PROGRESS NOTE ADULT - PROVIDER SPECIALTY LIST ADULT
CT Surgery
CT Surgery
Cardiology
Critical Care
Critical Care
Internal Medicine
CT Surgery
Critical Care
Internal Medicine
Internal Medicine
CT Surgery
Cardiology
Cardiology
Critical Care
Cardiology
Critical Care
Internal Medicine
Cardiology

## 2021-08-20 ENCOUNTER — TRANSCRIPTION ENCOUNTER (OUTPATIENT)
Age: 81
End: 2021-08-20

## 2021-08-21 ENCOUNTER — TRANSCRIPTION ENCOUNTER (OUTPATIENT)
Age: 81
End: 2021-08-21

## 2021-08-21 ENCOUNTER — EMERGENCY (EMERGENCY)
Facility: HOSPITAL | Age: 81
LOS: 0 days | Discharge: HOME | End: 2021-08-22
Attending: EMERGENCY MEDICINE | Admitting: EMERGENCY MEDICINE
Payer: MEDICAID

## 2021-08-21 VITALS
TEMPERATURE: 98 F | DIASTOLIC BLOOD PRESSURE: 89 MMHG | RESPIRATION RATE: 20 BRPM | HEART RATE: 124 BPM | SYSTOLIC BLOOD PRESSURE: 139 MMHG | HEIGHT: 64.57 IN | OXYGEN SATURATION: 98 % | WEIGHT: 179.9 LBS

## 2021-08-21 DIAGNOSIS — Z79.01 LONG TERM (CURRENT) USE OF ANTICOAGULANTS: ICD-10-CM

## 2021-08-21 DIAGNOSIS — I25.10 ATHEROSCLEROTIC HEART DISEASE OF NATIVE CORONARY ARTERY WITHOUT ANGINA PECTORIS: ICD-10-CM

## 2021-08-21 DIAGNOSIS — R07.89 OTHER CHEST PAIN: ICD-10-CM

## 2021-08-21 DIAGNOSIS — I48.91 UNSPECIFIED ATRIAL FIBRILLATION: ICD-10-CM

## 2021-08-21 DIAGNOSIS — Z79.82 LONG TERM (CURRENT) USE OF ASPIRIN: ICD-10-CM

## 2021-08-21 DIAGNOSIS — E78.5 HYPERLIPIDEMIA, UNSPECIFIED: ICD-10-CM

## 2021-08-21 DIAGNOSIS — Z20.822 CONTACT WITH AND (SUSPECTED) EXPOSURE TO COVID-19: ICD-10-CM

## 2021-08-21 DIAGNOSIS — I10 ESSENTIAL (PRIMARY) HYPERTENSION: ICD-10-CM

## 2021-08-21 DIAGNOSIS — Z95.5 PRESENCE OF CORONARY ANGIOPLASTY IMPLANT AND GRAFT: ICD-10-CM

## 2021-08-21 DIAGNOSIS — Z95.1 PRESENCE OF AORTOCORONARY BYPASS GRAFT: ICD-10-CM

## 2021-08-21 LAB
ALBUMIN SERPL ELPH-MCNC: 3.7 G/DL — SIGNIFICANT CHANGE UP (ref 3.5–5.2)
ALP SERPL-CCNC: 95 U/L — SIGNIFICANT CHANGE UP (ref 30–115)
ALT FLD-CCNC: 55 U/L — HIGH (ref 0–41)
ANION GAP SERPL CALC-SCNC: 12 MMOL/L — SIGNIFICANT CHANGE UP (ref 7–14)
AST SERPL-CCNC: 26 U/L — SIGNIFICANT CHANGE UP (ref 0–41)
BASOPHILS # BLD AUTO: 0.04 K/UL — SIGNIFICANT CHANGE UP (ref 0–0.2)
BASOPHILS NFR BLD AUTO: 0.4 % — SIGNIFICANT CHANGE UP (ref 0–1)
BILIRUB SERPL-MCNC: 0.5 MG/DL — SIGNIFICANT CHANGE UP (ref 0.2–1.2)
BUN SERPL-MCNC: 11 MG/DL — SIGNIFICANT CHANGE UP (ref 10–20)
CALCIUM SERPL-MCNC: 8.3 MG/DL — LOW (ref 8.5–10.1)
CHLORIDE SERPL-SCNC: 96 MMOL/L — LOW (ref 98–110)
CO2 SERPL-SCNC: 21 MMOL/L — SIGNIFICANT CHANGE UP (ref 17–32)
CREAT SERPL-MCNC: 0.8 MG/DL — SIGNIFICANT CHANGE UP (ref 0.7–1.5)
EOSINOPHIL # BLD AUTO: 0.12 K/UL — SIGNIFICANT CHANGE UP (ref 0–0.7)
EOSINOPHIL NFR BLD AUTO: 1.2 % — SIGNIFICANT CHANGE UP (ref 0–8)
GLUCOSE SERPL-MCNC: 150 MG/DL — HIGH (ref 70–99)
HCT VFR BLD CALC: 31.2 % — LOW (ref 42–52)
HGB BLD-MCNC: 10.1 G/DL — LOW (ref 14–18)
IMM GRANULOCYTES NFR BLD AUTO: 3.8 % — HIGH (ref 0.1–0.3)
LYMPHOCYTES # BLD AUTO: 1.53 K/UL — SIGNIFICANT CHANGE UP (ref 1.2–3.4)
LYMPHOCYTES # BLD AUTO: 15.9 % — LOW (ref 20.5–51.1)
MAGNESIUM SERPL-MCNC: 2 MG/DL — SIGNIFICANT CHANGE UP (ref 1.8–2.4)
MCHC RBC-ENTMCNC: 30.5 PG — SIGNIFICANT CHANGE UP (ref 27–31)
MCHC RBC-ENTMCNC: 32.4 G/DL — SIGNIFICANT CHANGE UP (ref 32–37)
MCV RBC AUTO: 94.3 FL — HIGH (ref 80–94)
MONOCYTES # BLD AUTO: 1.01 K/UL — HIGH (ref 0.1–0.6)
MONOCYTES NFR BLD AUTO: 10.5 % — HIGH (ref 1.7–9.3)
NEUTROPHILS # BLD AUTO: 6.58 K/UL — HIGH (ref 1.4–6.5)
NEUTROPHILS NFR BLD AUTO: 68.2 % — SIGNIFICANT CHANGE UP (ref 42.2–75.2)
NRBC # BLD: 0 /100 WBCS — SIGNIFICANT CHANGE UP (ref 0–0)
PLATELET # BLD AUTO: 402 K/UL — HIGH (ref 130–400)
POTASSIUM SERPL-MCNC: 4.6 MMOL/L — SIGNIFICANT CHANGE UP (ref 3.5–5)
POTASSIUM SERPL-SCNC: 4.6 MMOL/L — SIGNIFICANT CHANGE UP (ref 3.5–5)
PROT SERPL-MCNC: 5.8 G/DL — LOW (ref 6–8)
RBC # BLD: 3.31 M/UL — LOW (ref 4.7–6.1)
RBC # FLD: 12.1 % — SIGNIFICANT CHANGE UP (ref 11.5–14.5)
SARS-COV-2 RNA SPEC QL NAA+PROBE: SIGNIFICANT CHANGE UP
SODIUM SERPL-SCNC: 129 MMOL/L — LOW (ref 135–146)
WBC # BLD: 9.65 K/UL — SIGNIFICANT CHANGE UP (ref 4.8–10.8)
WBC # FLD AUTO: 9.65 K/UL — SIGNIFICANT CHANGE UP (ref 4.8–10.8)

## 2021-08-21 PROCEDURE — 99218: CPT

## 2021-08-21 PROCEDURE — 71045 X-RAY EXAM CHEST 1 VIEW: CPT | Mod: 26

## 2021-08-21 PROCEDURE — 93010 ELECTROCARDIOGRAM REPORT: CPT | Mod: 76

## 2021-08-21 RX ORDER — PANTOPRAZOLE SODIUM 20 MG/1
40 TABLET, DELAYED RELEASE ORAL
Refills: 0 | Status: DISCONTINUED | OUTPATIENT
Start: 2021-08-21 | End: 2021-08-22

## 2021-08-21 RX ORDER — AMIODARONE HYDROCHLORIDE 400 MG/1
1 TABLET ORAL
Qty: 900 | Refills: 0 | Status: DISCONTINUED | OUTPATIENT
Start: 2021-08-21 | End: 2021-08-22

## 2021-08-21 RX ORDER — AMIODARONE HYDROCHLORIDE 400 MG/1
0.03 TABLET ORAL
Qty: 900 | Refills: 0 | Status: DISCONTINUED | OUTPATIENT
Start: 2021-08-21 | End: 2021-08-21

## 2021-08-21 RX ORDER — APIXABAN 2.5 MG/1
5 TABLET, FILM COATED ORAL EVERY 12 HOURS
Refills: 0 | Status: DISCONTINUED | OUTPATIENT
Start: 2021-08-21 | End: 2021-08-22

## 2021-08-21 RX ORDER — ASPIRIN/CALCIUM CARB/MAGNESIUM 324 MG
81 TABLET ORAL DAILY
Refills: 0 | Status: DISCONTINUED | OUTPATIENT
Start: 2021-08-21 | End: 2021-08-22

## 2021-08-21 RX ORDER — TAMSULOSIN HYDROCHLORIDE 0.4 MG/1
0.4 CAPSULE ORAL AT BEDTIME
Refills: 0 | Status: DISCONTINUED | OUTPATIENT
Start: 2021-08-21 | End: 2021-08-22

## 2021-08-21 RX ORDER — METOPROLOL TARTRATE 50 MG
25 TABLET ORAL EVERY 8 HOURS
Refills: 0 | Status: DISCONTINUED | OUTPATIENT
Start: 2021-08-21 | End: 2021-08-22

## 2021-08-21 RX ORDER — AMIODARONE HYDROCHLORIDE 400 MG/1
150 TABLET ORAL ONCE
Refills: 0 | Status: COMPLETED | OUTPATIENT
Start: 2021-08-21 | End: 2021-08-21

## 2021-08-21 RX ADMIN — AMIODARONE HYDROCHLORIDE 600 MILLIGRAM(S): 400 TABLET ORAL at 21:40

## 2021-08-21 RX ADMIN — AMIODARONE HYDROCHLORIDE 33.3 MG/MIN: 400 TABLET ORAL at 22:30

## 2021-08-21 NOTE — ED PROVIDER NOTE - NSICDXPASTMEDICALHX_GEN_ALL_CORE_FT
PAST MEDICAL HISTORY:  Afib     CAD (coronary artery disease)     HLD (hyperlipidemia)     Hypertension

## 2021-08-21 NOTE — ED PROVIDER NOTE - OBJECTIVE STATEMENT
81yM pmhx  CAD s/p PCI x 3 (2001x1; 2010 x2), Afib on Eliquis, HTN, and HLD c/o chest pain starting today at 0800; constant, stable; associated w/ palpitations; found to be in afib RVR by EMS; pt recently admitted and underwent CABG w/ Dr Smith, d/c'ed 3 days ago, supposed to be taking amio at home but has not been. Denies fever/chills, abd pain, n/v/d.

## 2021-08-21 NOTE — ED CDU PROVIDER INITIAL DAY NOTE - PHYSICAL EXAMINATION
Constitutional: Well developed, well nourished. NAD  Head: Normocephalic, atraumatic.  Eyes: PERRL, EOMI.  ENT: No nasal discharge. Mucous membranes dry.  Neck: Supple. Painless ROM.  Cardiovascular: Normal S1, S2. HR Irreg irreg; thoracotomy incision with steristrips noted healing; no signs of infection;   Pulmonary: Lungs clear to auscultation bilaterally.   Abdominal: Soft. Nondistended. No rebound, guarding, rigidity.  Extremities. Pelvis stable. No lower extremity edema, symmetric calves.  Skin: No rashes, cyanosis.  Neuro: AAOx3. No focal neurological deficits.  Psych: Normal mood. Normal affect.

## 2021-08-21 NOTE — ED PROVIDER NOTE - CLINICAL SUMMARY MEDICAL DECISION MAKING FREE TEXT BOX
I have fully discussed the medical management and delivery of care with the patient. I have discussed any available labs, imaging and treatment options with the patient.  Obs team aware of pt and discussion w/ intensivist from CT surgery. Pt on monitor, feeling better at this time, placed in obs.

## 2021-08-21 NOTE — ED PROVIDER NOTE - CARE PLAN
Principal Discharge DX:	Atrial fibrillation with RVR  Secondary Diagnosis:	S/P CABG (coronary artery bypass graft)   1 Principal Discharge DX:	Atrial fibrillation with RVR  Assessment and plan of treatment:	Plan: Monitor, EKG, CXR, labs, ct surgery consult, reassess.  Secondary Diagnosis:	S/P CABG (coronary artery bypass graft)

## 2021-08-21 NOTE — ED PROVIDER NOTE - PROGRESS NOTE DETAILS
AG:  sayed CTSx aware--labs, place in obs if still in afib ED Attending LEONIDAS Murry Dr., Intensivist, at bedside. AG: per Dr Mcleod amicassy bolus 150, 1mg/hr gtt, place in obs and CTSx will see in AM. ED Attending LEONIDAS Murry  Obs team aware of pt, pt and family aware of results, admission to obs, agree with plan, OBS ARELY Norman aware of pt, amiodarone ordered as discussed with Dr. Mcleod. Following pt.

## 2021-08-21 NOTE — ED CDU PROVIDER INITIAL DAY NOTE - MEDICAL DECISION MAKING DETAILS
Pt receive amio bolus, on drip as discussed with Intensivist Kayleen Mcleod, will continue to monitor and reassess.

## 2021-08-21 NOTE — ED PROVIDER NOTE - NS ED ROS FT
Review of Systems:  	•	CONSTITUTIONAL - no fever, no diaphoresis  	•	SKIN - no rash, no lesions  	•	HEMATOLOGIC - no bleeding, no bruising  	•	EYES - no discharge, no injection  	•	ENT - no sore throat, no runny nose  	•	RESPIRATORY - no shortness of breath, no cough  	•	CARDIAC - +chest pain, +palpitations  	•	GI - no abd pain, no nausea, no vomiting, no diarrhea  	•	GENITO-URINARY - no dysuria, no hematuria  	•	MUSCULOSKELETAL - no joint pain, no muscle aches  	•	NEUROLOGIC - no dizziness, no headache

## 2021-08-21 NOTE — CHART NOTE - NSCHARTNOTEFT_GEN_A_CORE
pt is well is well known to us s/p CABG  had Hx of ch A fib on eliquis and multak prior toCABG    post u had to be olaced on amio drip then po and was rate controlled ...but Amio and statin were held due to rise in LFT  today LFT is  coming downa nd safe to restart amio drip     pt came to ER for alpiation HR     saw pt in Er expalin to pt he ll be in Observation till tomorrow and hopfuly go home if Hr controlled    spoke to Er and Obs Pa   please resume home meds   and give lopressor 25 po now    start amio drip at 1 mg /m after 150 mg Boulous   f/u LFT in am

## 2021-08-21 NOTE — ED PROVIDER NOTE - PHYSICAL EXAMINATION
Vital Signs: Reviewed  GEN: alert, NAD, speaks full sentences  HEAD:  normocephalic, atraumatic  EYES:  PERRLA; conjunctivae without injection, drainage or discharge  ENMT:  nasal mucosa moist; mouth moist without ulcerations or lesions; throat moist without erythema, exudate, ulcerations or lesions  NECK:  supple  CARDIAC:  irregular rate, normal S1 and S2, no murmurs  RESP:  respiratory rate and effort appear normal for age; lungs are clear to auscultation bilaterally; no rales or wheezes  ABDOMEN:  soft, nontender, nondistended  MUSCULOSKELETAL/NEURO:  normal movement, normal tone  SKIN: midline sternal incision healing well, no erythema or purulent discharge; normal skin color for age and race, well-perfused; warm and dry

## 2021-08-21 NOTE — ED PROVIDER NOTE - ATTENDING CONTRIBUTION TO CARE
80 y/o , w/ pmhx of CAD s/p PCI x 3 (2001x1; 2010 x2), Afib on Eliquis, , HTN and HLD admission from 8/8/ to 8/12 for syncope  associated with chest pain and palpitations s/p cardiac cath revealed 3vcad (on 08/12/21, he underwent surgical myocardial revascularization. Post-operatively,  the patient had recurrent a. fib and converted to sinus rhythm pharmacologically.  He also developed diarrhea which was attributed to colchicine.  The diarrhea resolved once the colchicine was stopped.  He otherwise had an uneventful hospital course and was discharged home in stable condition on POD #7.  Statin therapy was stopped due to an increase in LFT's.) presents today for 82 y/o , w/ pmhx of CAD s/p PCI x 3 (2001x1; 2010 x2), Afib on Eliquis, HTN and HLD admission from 8/8/ to 8/12 for syncope  associated with chest pain and palpitations s/p cardiac cath revealed 3vcad (on 08/12/21, he underwent surgical myocardial revascularization. Post-operatively,  the patient had recurrent a. fib and converted to sinus rhythm pharmacologically.  He also developed diarrhea which was attributed to colchicine.  The diarrhea resolved once the colchicine was stopped.  He otherwise had an uneventful hospital course and was discharged home in stable condition on POD #7.  Statin therapy was stopped due to an increase in LFT's.) presents today for palpitations associated with generalized weakness. No fever, chills, n/v, cp, sob, pleuritic cp,  diaphoresis, cough, ha/lh/dizziness, numbness/tingling, neck pain/ stiffness, abd pain, diarrhea, constipation, melena/brbpr, urinary symptoms, trauma, weakness, edema, calf pain/swelling/erythema, sick contacts, recent travel or rash. (-) smoker, Dr. Salazar- ct surgeon.     on exam: wdwnnon toxic appearing pt sitting on stretcher speaking full sentences, no rash, mmm, neck supple. non-tender , irregular, irregular, no jvd, no pain to palpation to chest wall, no pain with movement, ctabl w/ breath sounds present b/l, no wheezing or crackles, no accessory muscle use, no tachypnea, no stridor, bs present throughout all 4 quadrants, abd soft, nd, nt, no rebound tenderness or guarding, no cvat, FROM of ext, no calf pain/swelling/erythema, AAOx3. motor 5/5 and sensation intact throughout upper and lower ext. no focal deficits. 80 y/o w/ pmhx of CAD s/p PCI x 3 (2001x1; 2010 x2), Afib on Eliquis, HTN and HLD admission from 8/8/ to 8/12 for syncope  associated with chest pain and palpitations s/p cardiac cath revealed 3vcad (on 08/12/21, he underwent surgical myocardial revascularization. Post-operatively,  the patient had recurrent a. fib and converted to sinus rhythm pharmacologically.  He also developed diarrhea which was attributed to colchicine. The diarrhea resolved once the colchicine was stopped.  He otherwise had an uneventful hospital course and was discharged home in stable condition on POD #7.  Statin therapy was stopped due to an increase in LFT's.) presents today for palpitations associated with generalized weakness. No fever, chills, n/v, cp, sob, pleuritic cp,  diaphoresis, cough, ha/lh/dizziness, numbness/tingling, neck pain/ stiffness, abd pain, diarrhea, constipation, melena/brbpr, urinary symptoms, trauma, weakness, edema, calf pain/swelling/erythema, sick contacts, recent travel or rash. (-) smoker, Dr. Gurrola- ct surgeon.     on exam: wdwn non toxic appearing pt sitting on stretcher speaking full sentences, no rash, mmm, neck supple. non-tender , irregular, irregular, no jvd, no pain to palpation to chest wall, no pain with movement, ctabl w/ breath sounds present b/l, no wheezing or crackles, no accessory muscle use, no tachypnea, no stridor, bs present throughout all 4 quadrants, abd soft, nd, nt, no rebound tenderness or guarding, no cvat, FROM of ext, no calf pain/swelling/erythema, AAOx3. motor 5/5 and sensation intact throughout upper and lower ext. no focal deficits.

## 2021-08-21 NOTE — ED CDU PROVIDER INITIAL DAY NOTE - OBJECTIVE STATEMENT
81 yold male to ED PMH Htn, Afib on Eliquis, HLD, Cad s/p stents x 3(2001,2010); pt s/p CABG 8/12 by Dr. Smith d/c from hospital 3 days ago now presents c/o mild chest pain, palpitations started 8am today; pt with chronic Afib previously controlled with Multaq which was stopped prior to dc due to elevated LFTs; on arrival pt found to be in Afib with rvr; pt currently feeling better now; cp resolved;

## 2021-08-21 NOTE — ED CDU PROVIDER INITIAL DAY NOTE - PROGRESS NOTE DETAILS
received signout from Dr. Lujan - pt placed in obs s/p thoracic intervention protocol(s/p cabg 8/12); c/o chest pain and palpitations; pt with hx of chronic afib was amio/statin but d/c due to elevated Lft; pt today with rapid afib; seen by Dr. [facility] Intensivist; recomm amio bolus/drip and restarting home meds; repeat lft in am; pt to be evaluated by ct surgery team in am;

## 2021-08-21 NOTE — ED CLERICAL - NS ED CLERK NOTE PRE-ARRIVAL INFORMATION; ADDITIONAL PRE-ARRIVAL INFORMATION
This patient is enrolled in the readmission program Follow Your Heart and has active care navigation. This patient can be followed up by the care navigation team within 24 hours. To arrange close follow-up or to obtain additional clinical information about this patient, please call the contact number above.

## 2021-08-21 NOTE — ED ADULT TRIAGE NOTE - CHIEF COMPLAINT QUOTE
pt history of CABG 9 days ago, chest pain "cause of arrythmia" since 8am today, as per EMS hes been in and out a-fib. Denies SOB.

## 2021-08-22 ENCOUNTER — NON-APPOINTMENT (OUTPATIENT)
Age: 81
End: 2021-08-22

## 2021-08-22 VITALS
SYSTOLIC BLOOD PRESSURE: 155 MMHG | OXYGEN SATURATION: 97 % | RESPIRATION RATE: 18 BRPM | DIASTOLIC BLOOD PRESSURE: 75 MMHG | HEART RATE: 64 BPM

## 2021-08-22 LAB
ALBUMIN SERPL ELPH-MCNC: 3.4 G/DL — LOW (ref 3.5–5.2)
ALP SERPL-CCNC: 91 U/L — SIGNIFICANT CHANGE UP (ref 30–115)
ALT FLD-CCNC: 46 U/L — HIGH (ref 0–41)
AST SERPL-CCNC: 20 U/L — SIGNIFICANT CHANGE UP (ref 0–41)
BILIRUB DIRECT SERPL-MCNC: 0.3 MG/DL — HIGH (ref 0–0.2)
BILIRUB INDIRECT FLD-MCNC: 0.6 MG/DL — SIGNIFICANT CHANGE UP (ref 0.2–1.2)
BILIRUB SERPL-MCNC: 0.9 MG/DL — SIGNIFICANT CHANGE UP (ref 0.2–1.2)
PROT SERPL-MCNC: 5.5 G/DL — LOW (ref 6–8)
TROPONIN T SERPL-MCNC: 0.04 NG/ML — CRITICAL HIGH

## 2021-08-22 PROCEDURE — 93010 ELECTROCARDIOGRAM REPORT: CPT

## 2021-08-22 PROCEDURE — 99217: CPT

## 2021-08-22 RX ORDER — AMIODARONE HYDROCHLORIDE 400 MG/1
1 TABLET ORAL
Qty: 14 | Refills: 0
Start: 2021-08-22 | End: 2021-09-04

## 2021-08-22 RX ORDER — AMIODARONE HYDROCHLORIDE 400 MG/1
200 TABLET ORAL ONCE
Refills: 0 | Status: COMPLETED | OUTPATIENT
Start: 2021-08-22 | End: 2021-08-22

## 2021-08-22 RX ORDER — AMIODARONE HYDROCHLORIDE 400 MG/1
2 TABLET ORAL
Qty: 28 | Refills: 0
Start: 2021-08-22 | End: 2021-09-04

## 2021-08-22 RX ADMIN — Medication 25 MILLIGRAM(S): at 08:37

## 2021-08-22 RX ADMIN — APIXABAN 5 MILLIGRAM(S): 2.5 TABLET, FILM COATED ORAL at 06:17

## 2021-08-22 RX ADMIN — Medication 25 MILLIGRAM(S): at 00:50

## 2021-08-22 RX ADMIN — AMIODARONE HYDROCHLORIDE 200 MILLIGRAM(S): 400 TABLET ORAL at 08:33

## 2021-08-22 RX ADMIN — TAMSULOSIN HYDROCHLORIDE 0.4 MILLIGRAM(S): 0.4 CAPSULE ORAL at 00:50

## 2021-08-22 RX ADMIN — PANTOPRAZOLE SODIUM 40 MILLIGRAM(S): 20 TABLET, DELAYED RELEASE ORAL at 06:17

## 2021-08-22 NOTE — ED CDU PROVIDER DISPOSITION NOTE - CLINICAL COURSE
81-year-old male presents to the ED for chest pain and palpitations and CABG.  Patient has a history of chronic A. fib with recent change in medications.  Patient was found to be in A. fib in the ED with RVR.  Patient was transitioned to amiodarone drip in the ED.  Vitals this morning were noted to have a heart rate of 72 regular, blood pressure of 167/74, with no chest pain, and overall well-appearing patient.  Pending cardiology evaluation.  With the recommendations of cardiology patient transitioned to oral amiodarone and discharged home with a 2-week prescription.

## 2021-08-22 NOTE — ED CDU PROVIDER DISPOSITION NOTE - CARE PROVIDER_API CALL
your PMD,   Phone: (   )    -  Fax: (   )    -  Follow Up Time: 1-3 Days    Grady Dvais (MD)  Cardiovascular Disease; Internal Medicine; Interventional Cardiology  [address] [place] [address]  Phone: [phone]  Fax: [phone]  Follow Up Time: 1-3 Days    CT surgery clinic,   Phone: (   )    -  Fax: (   )    -  Follow Up Time: 1-3 Days

## 2021-08-22 NOTE — ED CDU PROVIDER SUBSEQUENT DAY NOTE - PROGRESS NOTE DETAILS
Per CT surgery ARELY Montilla, start Amiodarone 200mg now, dc Amiodarone drip 1 hour post PO dose. Patient can be dc home with instructions as found on consult.    Per RN  Laila, Amiodarone drip has been stopped since 7am. CT Surgery ARELY Montilla aware and  states patient can be dc home at this time Spoke with patient via [place]  #[phone].  Patient feels well, asymptomatic at this time. CT surgery recommendations discussed with patient. Patient verbalized understanding.

## 2021-08-22 NOTE — ED CDU PROVIDER DISPOSITION NOTE - WR ORDER STATUS 1
LS,  Please see below MyChart message and advise.    The 10-year ASCVD risk score (Westfordkarishma BLEDSOE Jr., et al., 2013) is: 3%    Values used to calculate the score:      Age: 56 years      Sex: Female      Is Non- : No      Diabetic: No      Tobacco smoker: No      Systolic Blood Pressure: 122 mmHg      Is BP treated: No      HDL Cholesterol: 41 mg/dL      Total Cholesterol: 266 mg/dL    Caroline Nunes RN     Performed

## 2021-08-22 NOTE — ED CDU PROVIDER SUBSEQUENT DAY NOTE - MEDICAL DECISION MAKING DETAILS
81-year-old male presents to the ED for chest pain and palpitations and CABG.  Patient has a history of chronic A. fib with recent change in medications.  Patient was found to be in A. fib in the ED with RVR.  Patient was transitioned to amiodarone drip in the ED.  Vitals this morning were noted to have a heart rate of 72 regular, blood pressure of 167/74, with no chest pain, and overall well-appearing patient.  Pending cardiology evaluation.

## 2021-08-22 NOTE — PROGRESS NOTE ADULT - SUBJECTIVE AND OBJECTIVE BOX
OPERATIVE PROCEDURE(s):  CABGx3   08/12/21                             SURGEON(s): ABHIJIT Smith    Patient is a 80 yo M hx of CAD s/p PCI x 3 (2001x1; 2010 x2), Afib on Eliquis (last dose 8/8), HTN and HLD. Patient presented c/o Syncope x2 associated with chest pain and palpitations.  He also noticed that he has CP with exertion which is new for him. He normally walks a lot but has needed to stop to rest while walking up the stairs due to CP. Patient ruled in for NSTEMI w/peak trop 0.15 and subsequent cardiac cath revealed 3vcad. On 08/12/21, he underwent surgical myocardial revascularization. Post-operatively,  the patient had recurrent a. fib and converted to sinus rhythm pharmacologically.  He also developed diarrhea which was attributed to colchicine.  The diarrhea resolved once the colchicine was stopped.  He otherwise had an uneventful hospital course and was discharged home in stable condition on POD #7.  Statin therapy was stopped due to an increase in LFT's.  He was advised to have a repeat BMP prior to post op visit.    Patient presented to ED w/complaints of palpitations (Hr 's) w/Hx of pre-op paroxysmal A-fib. Patient placed in obs for amiodarone loading.      SUBJECTIVE ASSESSMENT:81yMale patient seen and examined at bedside. Patient denies any acute complaints at this time. Currently NSR.    Vital Signs Last 24 Hrs  T(F): 97.8 (22 Aug 2021 06:06), Max: 98.2 (21 Aug 2021 17:24)  HR: 97 (22 Aug 2021 06:06) (83 - 124)  BP: 141/85 (22 Aug 2021 06:06) (121/70 - 141/85)  BP(mean): 90 (21 Aug 2021 19:00) (90 - 90)  RR: 18 (22 Aug 2021 06:06) (18 - 20)  SpO2: 97% (22 Aug 2021 06:06) (97% - 99%)    I&O's Detail    Net: I&O's Detail    A1C with Estimated Average Glucose Result: 5.6 % (08-10-21 @ 07:56)      Physical Exam:  General: NAD; A&Ox3  Cardiac: S1/S2, RRR, no murmur, no rubs  Lungs: unlabored respirations, CTA b/l, no wheeze, no rales, no crackles  Abdomen: Soft/NT/ND; positive bowel sounds x 4  Sternum: Intact, no click, incision healing well with no drainage  Incisions: Incisions clean/dry/intact  Extremities: No edema b/l lower extremities; good capillary refill; no cyanosis; palpable 1+ pedal pulses b/l        LABS:                        10.1<L>  9.65  )-----------( 402<H>    ( 21 Aug 2021 18:53 )             31.2<L>    08-21    129<L>  |  96<L>  |  11  ----------------------------<  150<H>  4.6   |  21  |  0.8    Ca    8.3<L>      21 Aug 2021 17:49  Mg     2.0     08-21    TPro  5.5<L> [6.0 - 8.0]  /  Alb  3.4<L> [3.5 - 5.2]  /  TBili  0.9 [0.2 - 1.2]  /  DBili  0.3<H> [0.0 - 0.2]  /  AST  20 [0 - 41]  /  ALT  46<H> [0 - 41]  /  AlkPhos  91 [30 - 115]  08-22      RADIOLOGY & ADDITIONAL TESTS:  EKG: < from: 12 Lead ECG (08.22.21 @ 00:56) >  Ventricular Rate 95 BPM  Atrial Rate 202 BPM  QRS Duration 102 ms  Q-T Interval 386 ms  QTC Calculation(Bazett) 485 ms  R Axis 59 degrees  T Axis 89 degrees  Diagnosis Line Atrial fibrillation  Inferior infarct , age undetermined  Abnormal ECG  Confirmed by Manjinder Bowen ([id number]) on 8/22/2021 7:02:20 AM  < end of copied text >      Allergies  No Known Allergies  Intolerances      MEDICATIONS  (STANDING):  aMIOdarone Infusion 1 mG/Min (33.3 mL/Hr) IV Continuous <Continuous>  apixaban 5 milliGRAM(s) Oral every 12 hours  aspirin  chewable 81 milliGRAM(s) Oral daily  metoprolol tartrate 25 milliGRAM(s) Oral every 8 hours  pantoprazole    Tablet 40 milliGRAM(s) Oral before breakfast  tamsulosin 0.4 milliGRAM(s) Oral at bedtime    MEDICATIONS  (PRN):      Assessment/Plan:  81y Male status-post CABGx3   08/12/21 w/recurrent paroxysmal A-fib.  - Case and plan discussed with CTU Intensivist and CT Surgeon - Dr. Salazar  1. CAD: Continue ASA and BB. Statin therapy held for elevated LFTs and will be reassessed as out patient.   2. Pre-op paroxysmal A. Fib: Currently NSR s/p amiodarone gtt. May restart home dose amiodarone po 200 mg Qd x 2- 3 months and will f/u w/ his cardiologist Dr. Davis for continued medical management.  Cont Eliquis for a/c.  3. CTS: please f/u in outpt CTS clinic this week. Daylin Christina Cordova, 2nd floor suit [phone].     Social Service Disposition:  Ok to discharge home on PO amiodarone as described above.   Please contact CTS PA @ [phone] for any questions or concerns.

## 2021-08-22 NOTE — ED CDU PROVIDER DISPOSITION NOTE - PATIENT PORTAL LINK FT
You can access the FollowMyHealth Patient Portal offered by [facility] by registering at the following website: [email or web address]. By joining [facility]’s FollowMyHealth portal, you will also be able to view your health information using other applications (apps) compatible with our system.

## 2021-08-22 NOTE — ED CDU PROVIDER SUBSEQUENT DAY NOTE - HISTORY
pt resting in obs without compliants; Still in afib Hr 90s on amiodarone drip; metoprolol po given; pt pending eval by ct surgery team in am;

## 2021-08-22 NOTE — ED CDU PROVIDER DISPOSITION NOTE - PROVIDER TOKENS
FREE:[LAST:[your PMD],PHONE:[(   )    -],FAX:[(   )    -],FOLLOWUP:[1-3 Days]],PROVIDER:[TOKEN:[[id number]],FOLLOWUP:[1-3 Days]],FREE:[LAST:[CT surgery clinic],PHONE:[(   )    -],FAX:[(   )    -],FOLLOWUP:[1-3 Days]]

## 2021-08-22 NOTE — ED CDU PROVIDER DISPOSITION NOTE - NSFOLLOWUPINSTRUCTIONS_ED_ALL_ED_FT
1. CAD: Continue Aspirin, Eliquis, and Beta blocker. Hold statin ( Lipitor).  2. Start home dose amiodarone  200 mg daily and follow up cardiologist Dr. Davis for continued medical management.    3. Follow up  in  CT surgery  clinic this week. Daylin Cordova, 2nd floor suit [phone].

## 2021-08-22 NOTE — ED CDU PROVIDER DISPOSITION NOTE - CARE PROVIDERS DIRECT ADDRESSES
,DirectAddress_Unknown,[email or web address],DirectAddress_Unknown

## 2021-08-22 NOTE — ED ADULT NURSE REASSESSMENT NOTE - NS ED NURSE REASSESS COMMENT FT1
Pt assessed A/O times 4 Vs stable on cardiac monitor denies chest pain denies SOB no N/V no dizziness  pt is seen evaluate  by ED attending Amiodarone IV drip is toped as per ED attending order Amiodarone 200 mg po is given explained to patient plan  of care by ED attending  and [place] translation verbalize understanding of instruction given  On going nursing observation .
Pt reassessed remain stable no chest pain no SOB no N/V no dizziness is seen evaluate by Ed attending clear to go home  ambulate steady NAD noted .Ready to go home with family members NAD noted .
Pt received from day nurse. Resting in stretcher. Cardiac monitoring maintained. IVL. Will continue to monitor.
Pt received to Rm 20B, Patient comfortable in bed. A&Ox4, in NAD. Amiodarone maintenance started. Cardiac monitor in progress with intermittent Afib. Pt denies any chest or SOB. Denies any pain or discomfort. Call bell placed within reach. Will monitor

## 2021-08-23 DIAGNOSIS — R79.89 OTHER SPECIFIED ABNORMAL FINDINGS OF BLOOD CHEMISTRY: ICD-10-CM

## 2021-08-23 DIAGNOSIS — R55 SYNCOPE AND COLLAPSE: ICD-10-CM

## 2021-08-23 DIAGNOSIS — Z95.5 PRESENCE OF CORONARY ANGIOPLASTY IMPLANT AND GRAFT: ICD-10-CM

## 2021-08-23 DIAGNOSIS — I25.110 ATHEROSCLEROTIC HEART DISEASE OF NATIVE CORONARY ARTERY WITH UNSTABLE ANGINA PECTORIS: ICD-10-CM

## 2021-08-23 DIAGNOSIS — E78.5 HYPERLIPIDEMIA, UNSPECIFIED: ICD-10-CM
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
## 2021-08-23 DIAGNOSIS — Z79.01 LONG TERM (CURRENT) USE OF ANTICOAGULANTS: ICD-10-CM

## 2021-08-23 DIAGNOSIS — D69.6 THROMBOCYTOPENIA, UNSPECIFIED: ICD-10-CM

## 2021-08-23 DIAGNOSIS — R00.1 BRADYCARDIA, UNSPECIFIED: ICD-10-CM

## 2021-08-23 DIAGNOSIS — D62 ACUTE POSTHEMORRHAGIC ANEMIA: ICD-10-CM

## 2021-08-23 DIAGNOSIS — E87.1 HYPO-OSMOLALITY AND HYPONATREMIA: ICD-10-CM

## 2021-08-23 DIAGNOSIS — I21.4 NON-ST ELEVATION (NSTEMI) MYOCARDIAL INFARCTION: ICD-10-CM

## 2021-08-23 DIAGNOSIS — R09.89 OTHER SPECIFIED SYMPTOMS AND SIGNS INVOLVING THE CIRCULATORY AND RESPIRATORY SYSTEMS: ICD-10-CM

## 2021-08-23 DIAGNOSIS — I31.9 DISEASE OF PERICARDIUM, UNSPECIFIED: ICD-10-CM

## 2021-08-23 DIAGNOSIS — N40.0 BENIGN PROSTATIC HYPERPLASIA WITHOUT LOWER URINARY TRACT SYMPTOMS: ICD-10-CM

## 2021-08-23 DIAGNOSIS — I48.0 PAROXYSMAL ATRIAL FIBRILLATION: ICD-10-CM

## 2021-08-23 DIAGNOSIS — I10 ESSENTIAL (PRIMARY) HYPERTENSION: ICD-10-CM

## 2021-08-23 DIAGNOSIS — R50.9 FEVER, UNSPECIFIED: ICD-10-CM

## 2021-08-23 DIAGNOSIS — T50.4X5A ADVERSE EFFECT OF DRUGS AFFECTING URIC ACID METABOLISM, INITIAL ENCOUNTER: ICD-10-CM

## 2021-08-23 DIAGNOSIS — E87.70 FLUID OVERLOAD, UNSPECIFIED: ICD-10-CM

## 2021-08-23 DIAGNOSIS — R19.7 DIARRHEA, UNSPECIFIED: ICD-10-CM

## 2021-08-23 PROBLEM — I48.91 UNSPECIFIED ATRIAL FIBRILLATION: Chronic | Status: ACTIVE | Noted: 2021-08-21

## 2021-08-24 ENCOUNTER — APPOINTMENT (OUTPATIENT)
Dept: CARE COORDINATION | Facility: HOME HEALTH | Age: 81
End: 2021-08-24
Payer: SELF-PAY

## 2021-08-24 VITALS
DIASTOLIC BLOOD PRESSURE: 60 MMHG | OXYGEN SATURATION: 95 % | RESPIRATION RATE: 12 BRPM | HEART RATE: 88 BPM | SYSTOLIC BLOOD PRESSURE: 104 MMHG

## 2021-08-24 PROCEDURE — 99024 POSTOP FOLLOW-UP VISIT: CPT

## 2021-08-25 ENCOUNTER — NON-APPOINTMENT (OUTPATIENT)
Age: 81
End: 2021-08-25

## 2021-08-25 ENCOUNTER — APPOINTMENT (OUTPATIENT)
Dept: CARDIOTHORACIC SURGERY | Facility: CLINIC | Age: 81
End: 2021-08-25
Payer: MEDICAID

## 2021-08-25 VITALS
TEMPERATURE: 98.4 F | DIASTOLIC BLOOD PRESSURE: 70 MMHG | HEIGHT: 64.57 IN | SYSTOLIC BLOOD PRESSURE: 118 MMHG | WEIGHT: 165 LBS | RESPIRATION RATE: 14 BRPM | OXYGEN SATURATION: 95 % | HEART RATE: 89 BPM | BODY MASS INDEX: 27.83 KG/M2

## 2021-08-25 PROCEDURE — 99024 POSTOP FOLLOW-UP VISIT: CPT

## 2021-08-26 ENCOUNTER — TRANSCRIPTION ENCOUNTER (OUTPATIENT)
Age: 81
End: 2021-08-26

## 2021-08-27 ENCOUNTER — TRANSCRIPTION ENCOUNTER (OUTPATIENT)
Age: 81
End: 2021-08-27

## 2021-08-28 ENCOUNTER — TRANSCRIPTION ENCOUNTER (OUTPATIENT)
Age: 81
End: 2021-08-28

## 2021-08-29 ENCOUNTER — TRANSCRIPTION ENCOUNTER (OUTPATIENT)
Age: 81
End: 2021-08-29

## 2021-09-01 NOTE — ASSESSMENT
[FreeTextEntry1] : Spouse called [facility] prior to visit to report HR ~ 113, instructed to take afternoon dose of metoprolol 25 mg STAT\par upon exam, noted to be in a-fib now rate controlled\par per spouse, was seen by PMD on Saturday who restarted statin and amio 200 after lab work was performed\par \par Otherwise he is progressing well\par compliant w/ all meds/post-op cares\par medications reviewed\par ambulating/using IS\par f/u appointments reviewed\par \par Education\par 1.  DC instructions reviewed with patient - discussed importance of medications (indication, schedule, side effects, and importance of compliance reviewed) and f/u appointments.\par 2.  Clean incision sites daily - shower indirectly, clean with soap and water, pat dry.  Avoid the use of lotions, ointments, powders, and perfumes on or around incision sites.\par 3.  Sternal precautions - avoid any heavy lifting (>5-10 lbs x 8 weeks), raising both arms above head simultaneously.\par 4.  Place TEDs on in AM prior to getting out of bed and off in PM when returning to bed.\par 5.  Follow a heart healthy diet - low salt, low fat.\par 6.  Exercise daily.\par 7.  Monitor and call [facility] NP for signs and symptoms of infection (redness, drainage, and fever).\par 8.  Monitor daily weights (call for a gain of 2-3 lbs/day or 5-6 lbs/week). \par 9.  Blood sugar control necessary for wound healing if applicable.\par 10.  Avoid straining during BM - use stool softners as needed. \par 11.  Instructed on importance of incentive spirometry use (10x/hour).\par \par Patient verbalized understanding of all education outlined above. Pt instructed to call [facility] NP for any issues as delineated above.\par \par PLAN\par 1.  Monitor daily weights\par 2.  Continue current medications as prescribed\par 3.  Incentive spirometry use\par 4.  Maintain sternal precautions\par 5.  Exercise daily\par 5.  Maintain HH diet\par 6.  Maintain TEDs\par 7.  Keep legs elevated\par 8.  F/U appointments - \par Luis Smith 8/25\par 9.  [facility] NP will continue to f/u with patient status - Pt agrees to call with any questions/concerns\par 10. To recover without complications.\par \par \par

## 2021-09-01 NOTE — HISTORY OF PRESENT ILLNESS
[FreeTextEntry1] : FOLLOW YOUR HEART\par \par TCM COVID-19 screening protocol reviewed with patient and/or caregiver and denies any signs and symptoms.  Patient and/or care giver denies any contact with a suspect or known COVID-19 case within the last 14 days.  Patient and/or caregiver have tested negative for COVID-19. Patient and/or caregiver does not live in an assisted living or skilled nursing facility.  Patient and/or care giver [place] last 14 days. \par \par Hospital Course	 \par Patient is a 82 yo M hx of CAD s/p PCI x 3 (2001x1; 2010 x2), Afib on Eliquis \par (last dose 8/8), HTN and HLD. Patient presented c/o Syncope x2 associated with \par chest pain and palpitations.  He also noticed that he has CP with exertion \par which is new for him. He normally walks a lot but has needed to stop to rest \par while walking up the stairs due to CP. Patient ruled in for NSTEMI w/peak trop \par 0.15 and subsequent cardiac cath revealed 3vcad. On 08/12/21, he underwent \par surgical myocardial revascularization. Post-operatively,  the patient had \par recurrent a. fib and converted to sinus rhythm pharmacologically.  He also \par developed diarrhea which was attributed to colchicine.  The diarrhea resolved \par once the colchicine was stopped.  He otherwise had an uneventful hospital \par course and was discharged home in stable condition on POD #7.  Statin therapy \par was stopped due to an increase in LFT's.  He was advised to have a repeat BMP \par prior to post op visit. \par \par He is seen at home, accompanied by his spouse\par Denies fever, SOB, CP or palpitations\par

## 2021-09-01 NOTE — PHYSICAL EXAM
[Clean] : clean [Dry] : dry [Healing Well] : healing well [] : no respiratory distress [Respiration, Rhythm And Depth] : normal respiratory rhythm and effort [Auscultation Breath Sounds / Voice Sounds] : lungs were clear to auscultation bilaterally [2+] : left 2+ [Abdomen Soft] : soft [Abdomen Tenderness] : non-tender [Abnormal Walk] : normal gait [Oriented To Time, Place, And Person] : oriented to person, place, and time [Pitting Edema] : pitting edema present [___ +] : left pretibial [unfilled]U+ pretibial pitting edema

## 2021-09-03 ENCOUNTER — OUTPATIENT (OUTPATIENT)
Dept: OUTPATIENT SERVICES | Facility: HOSPITAL | Age: 81
LOS: 1 days | Discharge: HOME | End: 2021-09-03
Payer: SUBSIDIZED

## 2021-09-03 ENCOUNTER — APPOINTMENT (OUTPATIENT)
Dept: CARDIOTHORACIC SURGERY | Facility: CLINIC | Age: 81
End: 2021-09-03
Payer: MEDICAID

## 2021-09-03 VITALS
HEART RATE: 73 BPM | BODY MASS INDEX: 27.49 KG/M2 | RESPIRATION RATE: 18 BRPM | SYSTOLIC BLOOD PRESSURE: 167 MMHG | HEIGHT: 64 IN | TEMPERATURE: 98.6 F | OXYGEN SATURATION: 96 % | DIASTOLIC BLOOD PRESSURE: 85 MMHG | WEIGHT: 161 LBS

## 2021-09-03 DIAGNOSIS — R07.9 CHEST PAIN, UNSPECIFIED: ICD-10-CM

## 2021-09-03 DIAGNOSIS — Z95.1 PRESENCE OF AORTOCORONARY BYPASS GRAFT: ICD-10-CM

## 2021-09-03 LAB
ALBUMIN SERPL ELPH-MCNC: 3.8 G/DL
ALP BLD-CCNC: 108 U/L
ALT SERPL-CCNC: 32 U/L
ANION GAP SERPL CALC-SCNC: 11 MMOL/L
AST SERPL-CCNC: 16 U/L
BASOPHILS # BLD AUTO: 0.03 K/UL
BASOPHILS NFR BLD AUTO: 0.5 %
BILIRUB SERPL-MCNC: 0.4 MG/DL
BUN SERPL-MCNC: 16 MG/DL
CALCIUM SERPL-MCNC: 9.1 MG/DL
CHLORIDE SERPL-SCNC: 96 MMOL/L
CO2 SERPL-SCNC: 27 MMOL/L
CREAT SERPL-MCNC: 0.9 MG/DL
EOSINOPHIL # BLD AUTO: 0.21 K/UL
EOSINOPHIL NFR BLD AUTO: 3.6 %
GLUCOSE SERPL-MCNC: 123 MG/DL
HCT VFR BLD CALC: 37.5 %
HGB BLD-MCNC: 11.9 G/DL
IMM GRANULOCYTES NFR BLD AUTO: 0.3 %
LYMPHOCYTES # BLD AUTO: 1.04 K/UL
LYMPHOCYTES NFR BLD AUTO: 17.7 %
MAN DIFF?: NORMAL
MCHC RBC-ENTMCNC: 30.4 PG
MCHC RBC-ENTMCNC: 31.7 G/DL
MCV RBC AUTO: 95.9 FL
MONOCYTES # BLD AUTO: 0.76 K/UL
MONOCYTES NFR BLD AUTO: 12.9 %
NEUTROPHILS # BLD AUTO: 3.81 K/UL
NEUTROPHILS NFR BLD AUTO: 65 %
PLATELET # BLD AUTO: 296 K/UL
POTASSIUM SERPL-SCNC: 5 MMOL/L
PROT SERPL-MCNC: 6.3 G/DL
RBC # BLD: 3.91 M/UL
RBC # FLD: 12.5 %
SODIUM SERPL-SCNC: 134 MMOL/L
WBC # FLD AUTO: 5.87 K/UL

## 2021-09-03 PROCEDURE — 71046 X-RAY EXAM CHEST 2 VIEWS: CPT | Mod: 26

## 2021-09-03 PROCEDURE — 99024 POSTOP FOLLOW-UP VISIT: CPT

## 2021-09-03 RX ORDER — OXYCODONE AND ACETAMINOPHEN 5; 325 MG/1; MG/1
5-325 TABLET ORAL
Refills: 0 | Status: DISCONTINUED | COMMUNITY
Start: 2021-08-23 | End: 2021-09-03

## 2021-09-05 ENCOUNTER — TRANSCRIPTION ENCOUNTER (OUTPATIENT)
Age: 81
End: 2021-09-05

## 2021-09-08 ENCOUNTER — OUTPATIENT (OUTPATIENT)
Dept: OUTPATIENT SERVICES | Facility: HOSPITAL | Age: 81
LOS: 1 days | Discharge: HOME | End: 2021-09-08
Payer: SUBSIDIZED

## 2021-09-08 ENCOUNTER — APPOINTMENT (OUTPATIENT)
Dept: CARDIOTHORACIC SURGERY | Facility: CLINIC | Age: 81
End: 2021-09-08
Payer: MEDICAID

## 2021-09-08 VITALS
OXYGEN SATURATION: 94 % | HEART RATE: 67 BPM | BODY MASS INDEX: 26.98 KG/M2 | DIASTOLIC BLOOD PRESSURE: 82 MMHG | RESPIRATION RATE: 18 BRPM | SYSTOLIC BLOOD PRESSURE: 152 MMHG | HEIGHT: 64 IN | TEMPERATURE: 98.6 F | WEIGHT: 158 LBS

## 2021-09-08 DIAGNOSIS — Z95.1 PRESENCE OF AORTOCORONARY BYPASS GRAFT: ICD-10-CM

## 2021-09-08 LAB
ANION GAP SERPL CALC-SCNC: 9 MMOL/L
BASOPHILS # BLD AUTO: 0.04 K/UL
BASOPHILS NFR BLD AUTO: 0.6 %
BUN SERPL-MCNC: 14 MG/DL
CALCIUM SERPL-MCNC: 9.6 MG/DL
CHLORIDE SERPL-SCNC: 100 MMOL/L
CO2 SERPL-SCNC: 28 MMOL/L
CREAT SERPL-MCNC: 1 MG/DL
EOSINOPHIL # BLD AUTO: 0.3 K/UL
EOSINOPHIL NFR BLD AUTO: 4.5 %
GLUCOSE SERPL-MCNC: 125 MG/DL
HCT VFR BLD CALC: 40.9 %
HGB BLD-MCNC: 13 G/DL
IMM GRANULOCYTES NFR BLD AUTO: 0.4 %
LYMPHOCYTES # BLD AUTO: 1.33 K/UL
LYMPHOCYTES NFR BLD AUTO: 19.9 %
MAN DIFF?: NORMAL
MCHC RBC-ENTMCNC: 30.3 PG
MCHC RBC-ENTMCNC: 31.8 G/DL
MCV RBC AUTO: 95.3 FL
MONOCYTES # BLD AUTO: 0.83 K/UL
MONOCYTES NFR BLD AUTO: 12.4 %
NEUTROPHILS # BLD AUTO: 4.14 K/UL
NEUTROPHILS NFR BLD AUTO: 62.2 %
PLATELET # BLD AUTO: 314 K/UL
POTASSIUM SERPL-SCNC: 4.7 MMOL/L
RBC # BLD: 4.29 M/UL
RBC # FLD: 12.8 %
SODIUM SERPL-SCNC: 137 MMOL/L
WBC # FLD AUTO: 6.67 K/UL

## 2021-09-08 PROCEDURE — 71046 X-RAY EXAM CHEST 2 VIEWS: CPT | Mod: 26

## 2021-09-08 PROCEDURE — 99024 POSTOP FOLLOW-UP VISIT: CPT

## 2021-09-08 RX ORDER — POTASSIUM CHLORIDE 750 MG/1
10 CAPSULE, EXTENDED RELEASE ORAL TWICE DAILY
Qty: 6 | Refills: 0 | Status: DISCONTINUED | COMMUNITY
Start: 2021-09-03 | End: 2021-09-08

## 2021-09-08 RX ORDER — METOPROLOL TARTRATE 25 MG/1
25 TABLET, FILM COATED ORAL 3 TIMES DAILY
Refills: 0 | Status: DISCONTINUED | COMMUNITY
Start: 2021-08-23 | End: 2021-09-08

## 2021-09-08 RX ORDER — FUROSEMIDE 40 MG/1
40 TABLET ORAL
Qty: 3 | Refills: 0 | Status: DISCONTINUED | COMMUNITY
Start: 2021-09-03 | End: 2021-09-08

## 2021-09-12 ENCOUNTER — EMERGENCY (EMERGENCY)
Facility: HOSPITAL | Age: 81
LOS: 0 days | Discharge: HOME | End: 2021-09-12
Attending: EMERGENCY MEDICINE | Admitting: EMERGENCY MEDICINE
Payer: MEDICAID

## 2021-09-12 ENCOUNTER — TRANSCRIPTION ENCOUNTER (OUTPATIENT)
Age: 81
End: 2021-09-12

## 2021-09-12 VITALS
HEIGHT: 64.57 IN | WEIGHT: 158.95 LBS | DIASTOLIC BLOOD PRESSURE: 73 MMHG | HEART RATE: 108 BPM | OXYGEN SATURATION: 97 % | RESPIRATION RATE: 18 BRPM | SYSTOLIC BLOOD PRESSURE: 116 MMHG | TEMPERATURE: 97 F

## 2021-09-12 DIAGNOSIS — I10 ESSENTIAL (PRIMARY) HYPERTENSION: ICD-10-CM

## 2021-09-12 DIAGNOSIS — I25.10 ATHEROSCLEROTIC HEART DISEASE OF NATIVE CORONARY ARTERY WITHOUT ANGINA PECTORIS: ICD-10-CM

## 2021-09-12 DIAGNOSIS — E78.5 HYPERLIPIDEMIA, UNSPECIFIED: ICD-10-CM

## 2021-09-12 DIAGNOSIS — R06.02 SHORTNESS OF BREATH: ICD-10-CM

## 2021-09-12 DIAGNOSIS — I48.91 UNSPECIFIED ATRIAL FIBRILLATION: ICD-10-CM

## 2021-09-12 DIAGNOSIS — R55 SYNCOPE AND COLLAPSE: ICD-10-CM

## 2021-09-12 DIAGNOSIS — I25.2 OLD MYOCARDIAL INFARCTION: ICD-10-CM

## 2021-09-12 DIAGNOSIS — Z95.5 PRESENCE OF CORONARY ANGIOPLASTY IMPLANT AND GRAFT: ICD-10-CM

## 2021-09-12 DIAGNOSIS — J90 PLEURAL EFFUSION, NOT ELSEWHERE CLASSIFIED: ICD-10-CM

## 2021-09-12 DIAGNOSIS — Z79.01 LONG TERM (CURRENT) USE OF ANTICOAGULANTS: ICD-10-CM

## 2021-09-12 DIAGNOSIS — R07.9 CHEST PAIN, UNSPECIFIED: ICD-10-CM

## 2021-09-12 LAB
ALBUMIN SERPL ELPH-MCNC: 3.6 G/DL — SIGNIFICANT CHANGE UP (ref 3.5–5.2)
ALP SERPL-CCNC: 93 U/L — SIGNIFICANT CHANGE UP (ref 30–115)
ALT FLD-CCNC: 16 U/L — SIGNIFICANT CHANGE UP (ref 0–41)
ANION GAP SERPL CALC-SCNC: 11 MMOL/L — SIGNIFICANT CHANGE UP (ref 7–14)
AST SERPL-CCNC: 19 U/L — SIGNIFICANT CHANGE UP (ref 0–41)
BASOPHILS # BLD AUTO: 0.04 K/UL — SIGNIFICANT CHANGE UP (ref 0–0.2)
BASOPHILS NFR BLD AUTO: 0.5 % — SIGNIFICANT CHANGE UP (ref 0–1)
BILIRUB SERPL-MCNC: 0.4 MG/DL — SIGNIFICANT CHANGE UP (ref 0.2–1.2)
BUN SERPL-MCNC: 15 MG/DL — SIGNIFICANT CHANGE UP (ref 10–20)
CALCIUM SERPL-MCNC: 9 MG/DL — SIGNIFICANT CHANGE UP (ref 8.5–10.1)
CHLORIDE SERPL-SCNC: 98 MMOL/L — SIGNIFICANT CHANGE UP (ref 98–110)
CO2 SERPL-SCNC: 23 MMOL/L — SIGNIFICANT CHANGE UP (ref 17–32)
CREAT SERPL-MCNC: 0.9 MG/DL — SIGNIFICANT CHANGE UP (ref 0.7–1.5)
EOSINOPHIL # BLD AUTO: 0.35 K/UL — SIGNIFICANT CHANGE UP (ref 0–0.7)
EOSINOPHIL NFR BLD AUTO: 4.2 % — SIGNIFICANT CHANGE UP (ref 0–8)
GLUCOSE SERPL-MCNC: 136 MG/DL — HIGH (ref 70–99)
HCT VFR BLD CALC: 38.4 % — LOW (ref 42–52)
HGB BLD-MCNC: 12.4 G/DL — LOW (ref 14–18)
IMM GRANULOCYTES NFR BLD AUTO: 0.4 % — HIGH (ref 0.1–0.3)
LYMPHOCYTES # BLD AUTO: 1.45 K/UL — SIGNIFICANT CHANGE UP (ref 1.2–3.4)
LYMPHOCYTES # BLD AUTO: 17.5 % — LOW (ref 20.5–51.1)
MAGNESIUM SERPL-MCNC: 2.1 MG/DL — SIGNIFICANT CHANGE UP (ref 1.8–2.4)
MCHC RBC-ENTMCNC: 29.7 PG — SIGNIFICANT CHANGE UP (ref 27–31)
MCHC RBC-ENTMCNC: 32.3 G/DL — SIGNIFICANT CHANGE UP (ref 32–37)
MCV RBC AUTO: 91.9 FL — SIGNIFICANT CHANGE UP (ref 80–94)
MONOCYTES # BLD AUTO: 1.15 K/UL — HIGH (ref 0.1–0.6)
MONOCYTES NFR BLD AUTO: 13.9 % — HIGH (ref 1.7–9.3)
NEUTROPHILS # BLD AUTO: 5.27 K/UL — SIGNIFICANT CHANGE UP (ref 1.4–6.5)
NEUTROPHILS NFR BLD AUTO: 63.5 % — SIGNIFICANT CHANGE UP (ref 42.2–75.2)
NRBC # BLD: 0 /100 WBCS — SIGNIFICANT CHANGE UP (ref 0–0)
NT-PROBNP SERPL-SCNC: 748 PG/ML — HIGH (ref 0–300)
PLATELET # BLD AUTO: 274 K/UL — SIGNIFICANT CHANGE UP (ref 130–400)
POTASSIUM SERPL-MCNC: 4.8 MMOL/L — SIGNIFICANT CHANGE UP (ref 3.5–5)
POTASSIUM SERPL-SCNC: 4.8 MMOL/L — SIGNIFICANT CHANGE UP (ref 3.5–5)
PROT SERPL-MCNC: 6.3 G/DL — SIGNIFICANT CHANGE UP (ref 6–8)
RBC # BLD: 4.18 M/UL — LOW (ref 4.7–6.1)
RBC # FLD: 12.4 % — SIGNIFICANT CHANGE UP (ref 11.5–14.5)
SARS-COV-2 RNA SPEC QL NAA+PROBE: SIGNIFICANT CHANGE UP
SODIUM SERPL-SCNC: 132 MMOL/L — LOW (ref 135–146)
TROPONIN T SERPL-MCNC: 0.01 NG/ML — SIGNIFICANT CHANGE UP
WBC # BLD: 8.29 K/UL — SIGNIFICANT CHANGE UP (ref 4.8–10.8)
WBC # FLD AUTO: 8.29 K/UL — SIGNIFICANT CHANGE UP (ref 4.8–10.8)

## 2021-09-12 PROCEDURE — 93010 ELECTROCARDIOGRAM REPORT: CPT

## 2021-09-12 PROCEDURE — 32555 ASPIRATE PLEURA W/ IMAGING: CPT

## 2021-09-12 PROCEDURE — 71046 X-RAY EXAM CHEST 2 VIEWS: CPT | Mod: 26

## 2021-09-12 PROCEDURE — 93970 EXTREMITY STUDY: CPT | Mod: 26

## 2021-09-12 PROCEDURE — 99220: CPT

## 2021-09-12 PROCEDURE — 71045 X-RAY EXAM CHEST 1 VIEW: CPT | Mod: 26,59

## 2021-09-12 RX ORDER — FUROSEMIDE 40 MG
40 TABLET ORAL ONCE
Refills: 0 | Status: COMPLETED | OUTPATIENT
Start: 2021-09-12 | End: 2021-09-12

## 2021-09-12 RX ORDER — ACETAMINOPHEN 500 MG
975 TABLET ORAL ONCE
Refills: 0 | Status: COMPLETED | OUTPATIENT
Start: 2021-09-12 | End: 2021-09-12

## 2021-09-12 RX ORDER — METOPROLOL TARTRATE 50 MG
25 TABLET ORAL EVERY 8 HOURS
Refills: 0 | Status: DISCONTINUED | OUTPATIENT
Start: 2021-09-12 | End: 2021-09-12

## 2021-09-12 RX ORDER — ACETAMINOPHEN 500 MG
650 TABLET ORAL ONCE
Refills: 0 | Status: COMPLETED | OUTPATIENT
Start: 2021-09-12 | End: 2021-09-12

## 2021-09-12 RX ORDER — FUROSEMIDE 40 MG
40 TABLET ORAL DAILY
Refills: 0 | Status: DISCONTINUED | OUTPATIENT
Start: 2021-09-12 | End: 2021-09-12

## 2021-09-12 RX ORDER — AMIODARONE HYDROCHLORIDE 400 MG/1
200 TABLET ORAL DAILY
Refills: 0 | Status: DISCONTINUED | OUTPATIENT
Start: 2021-09-12 | End: 2021-09-12

## 2021-09-12 RX ADMIN — Medication 25 MILLIGRAM(S): at 21:40

## 2021-09-12 RX ADMIN — Medication 40 MILLIGRAM(S): at 21:40

## 2021-09-12 RX ADMIN — Medication 975 MILLIGRAM(S): at 23:49

## 2021-09-12 NOTE — ED CDU PROVIDER INITIAL DAY NOTE - WET READ LAUNCH FT
There is 1 Wet Read(s) to document. There are 3 Wet Read(s) to document. There are no Wet Read(s) to document.

## 2021-09-12 NOTE — CONSULT NOTE ADULT - SUBJECTIVE AND OBJECTIVE BOX
Surgeon: /Luis/Marie/Tomer    Consult requesting by:    HISTORY OF PRESENT ILLNESS:  Patient is a 80 yo M with hx of CAD s/p PCI x 3 (2001x1; 2010 x2), Afib on Eliquis (last dose 8/8), HTN and HLD. Patient presented c/o Syncope x2 associated with chest pain and palpitations.  He also noticed that he has CP with exertion which is new for him. He normally walks a lot but has needed to stop to rest while walking up the stairs due to CP. Patient ruled in for NSTEMI w/peak trop 0.15 and subsequent cardiac cath revealed 3vcad. On 08/12/21, he underwent surgical myocardial revascularization. Post-operatively,  the patient had recurrent a. fib and converted to sinus rhythm pharmacologically.  He also developed diarrhea which was attributed to colchicine.  The diarrhea resolved once the colchicine was stopped.  He otherwise had an uneventful hospital course and was discharged home in stable condition on POD #7.  Statin therapy was stopped due to an increase in LFT's.  He was advised to have a repeat BMP prior to post op visit.    On 8/21, the patient returned to the ER complaining of shortness of breath and was found to be in rapid a. fib.  He was treated with Amio being his LFT's decreased and converted to sinus rhythm pharmacologically.    On 9/12, the patient presented to the ER complaining of shortness of breath again and was found to have decreased BS at the right base of his lung.  Patient states that he has not taken his Lasix now for a few days, and was recently seen in the cardiac surgery clinic at which time he had a cxr that did not demonstrate a pleural effusion.    NYHA functional class    [ ] Class I (no limitation) [ ] Class II (slight limitation) [ ] Class III (marked limitation) [ ] Class IV (symptoms at rest)    PAST MEDICAL & SURGICAL HISTORY:  CAD (coronary artery disease)    HLD (hyperlipidemia)    Hypertension    Afib    MEDICATIONS  (STANDING):    MEDICATIONS  (PRN):    Antiplatelet therapy:                           Last dose/amt:    Allergies    No Known Allergies    Intolerances        SOCIAL HISTORY:  Smoker: [ ] Yes  [x] No        PACK YEARS:                         WHEN QUIT?  ETOH use: [ ] Yes  [x] No              FREQUENCY / QUANTITY:  Ilicit Drug use:  [ ] Yes  [x] No  Occupation: retired   Lives with: spouse    FAMILY HISTORY:      Review of Systems  CONSTITUTIONAL:  Fevers[ ] chills[ ] sweats[ ] fatigue[ ] weight loss[ ] weight gain [ ]                                     NEGATIVE [X ]   NEURO:  parathesias[ ] seizures [ ]  syncope [ ]  confusion [ ]                                                                                NEGATIVE[ X]   EYES: glasses[ ]  blurry vision[ ]  discharge[ ] pain[ ] glaucoma [ ]                                                                          NEGATIVE[X ]   ENMT:  difficulty hearing [ ]  vertigo[ ]  dysphagia[ ] epistaxis[ ] recent dental work [ ]                                    NEGATIVE[ X]   CV:  chest pain[ ] palpitations[ ] VARGAS [ ] diaphoresis [ ]                                                                                           NEGATIVE[ X]   RESPIRATORY:  wheezing[ ] SOB[ ] cough [ ] sputum[ ] hemoptysis[ ]                                                                  NEGATIVE[ ]   GI:  nausea[ ]  vommiting [ ]  diarrhea[ ] constipation [ ] melena [ ]                                                                      NEGATIVE[ X]   : hematuria[ ]  dysuria[ ] urgency[ ] incontinence[ ]                                                                                            NEGATIVE[ X]   MUSKULOSKELETAL:  arthritis[ ]  joint swelling [ ] muscle weakness [ ] Hx vein stripping [ ]                             NEGATIVE[X ]   SKIN/BREAST:  rash[ ] itching [ ]  hair loss[ ] masses[ ]                                                                                              NEGATIVE[ X]   PSYCH:  dementia [ ] depresion [ ] anxiety[ ]                                                                                                               NEGATIVE[X ]   HEME/LYMPH:  bruises easily[ ] enlarged lymph nodes[ ] tender lymph nodes[ ]                                               NEGATIVE[ X]   ENDOCRINE:  cold intolerance[ ] heat intolerance[ ] polydipsia[ ]                                                                          NEGATIVE[ X]     PHYSICAL EXAM  Vital Signs Last 24 Hrs  T(C): 35.9 (12 Sep 2021 13:50), Max: 35.9 (12 Sep 2021 13:50)  T(F): 96.7 (12 Sep 2021 13:50), Max: 96.7 (12 Sep 2021 13:50)  HR: 108 (12 Sep 2021 13:50) (108 - 108)  BP: 116/73 (12 Sep 2021 13:50) (116/73 - 116/73)  BP(mean): --  RR: 18 (12 Sep 2021 13:50) (18 - 18)  SpO2: 97% (12 Sep 2021 13:50) (97% - 97%)  Right arm bp: Left arm bp;    CONSTITUTIONAL:                                                                          WNL[x)  Neuro: WNL[ ] Normal exam oriented to person/place & time with no focal motor or sensory  deficits. Other                     Eyes: WNL[ ]   Normal exam of conjunctiva & lids, pupils equally reactive. Other     ENT: WNL[ ]    Normal exam of nasal/oral mucosa with absence of cyanosis. Other  Neck: WNL[ ]  Normal exam of jugular veins, trachea & thyroid. Other  Chest: WNL[ ] Normal lung exam with good air movement absence of wheezes, rales, or rhonchi: Other                                                                                CV:  Auscultation: normal [ ] S3[ ] S4[ ] Irregular [ ] Rub[ ] Clicks[ ]  -- dec bs at right base  Murmurs none:[ ]systolic [ ]  diastolic [ ] holosystolic [ ]  Carotids: No Bruits[ ] Other                 Abdominal Aorta: normal [ ] nonpalpable[ ]Other                                                                                      GI:           WNL[ ] Normal exam of abdomen, liver & spleen with no noted masses or tenderness. Other                                                                                                        Extremities: WNL[ ] Normal no evidence of cyanosis or deformity Edema: none[ ]trace[ ]1+[ ]2+[ ]3+[ ]4+[ ]  Lower Extremity Pulses: Right[ ] Left[ ]Varicosities[ ]  SKIN :WNL[ ] Normal exam to inspection & palation. Other:                                                          LABS:    CXR: moderate right pleural effusion; official report to follow    Assessment/ Plan:  Patient is a 80 yo male s/p cabg x 4 on 8/12/21 with post op course complicated by a. fib.  Pt now returns to the ER for a second time after complaining of shortness of breath secondary to a right pleural effusion  cont present tx as per medical intensivist  will perform right pleural thoracentesis with intensivist and hold off on eliquis at the present time  resume lasix  a. fib cont amio and b blocker             Surgeon: /Luis/Marie/Tomer    Consult requesting by:    HISTORY OF PRESENT ILLNESS:  Patient is a 80 yo M with hx of CAD s/p PCI x 3 (2001x1; 2010 x2), Afib on Eliquis (last dose 8/8), HTN and HLD. Patient presented c/o Syncope x2 associated with chest pain and palpitations.  He also noticed that he has CP with exertion which is new for him. He normally walks a lot but has needed to stop to rest while walking up the stairs due to CP. Patient ruled in for NSTEMI w/peak trop 0.15 and subsequent cardiac cath revealed 3vcad. On 08/12/21, he underwent surgical myocardial revascularization. Post-operatively,  the patient had recurrent a. fib and converted to sinus rhythm pharmacologically.  He also developed diarrhea which was attributed to colchicine.  The diarrhea resolved once the colchicine was stopped.  He otherwise had an uneventful hospital course and was discharged home in stable condition on POD #7.  Statin therapy was stopped due to an increase in LFT's.  He was advised to have a repeat BMP prior to post op visit.    On 8/21, the patient returned to the ER complaining of shortness of breath and was found to be in rapid a. fib.  He was treated with Amio being his LFT's decreased and converted to sinus rhythm pharmacologically.    On 9/12, the patient presented to the ER complaining of shortness of breath again and was found to have decreased BS at the right base of his lung.  Patient states that he has not taken his Lasix now for a few days, and was recently seen in the cardiac surgery clinic at which time he had a cxr that did not demonstrate a pleural effusion.    NYHA functional class    [ ] Class I (no limitation) [ ] Class II (slight limitation) [ ] Class III (marked limitation) [ ] Class IV (symptoms at rest)    PAST MEDICAL & SURGICAL HISTORY:  CAD (coronary artery disease)    HLD (hyperlipidemia)    Hypertension    Afib    MEDICATIONS  (STANDING):    MEDICATIONS  (PRN):    Antiplatelet therapy:                           Last dose/amt:    Allergies    No Known Allergies    Intolerances        SOCIAL HISTORY:  Smoker: [ ] Yes  [x] No        PACK YEARS:                         WHEN QUIT?  ETOH use: [ ] Yes  [x] No              FREQUENCY / QUANTITY:  Ilicit Drug use:  [ ] Yes  [x] No  Occupation: retired   Lives with: spouse    FAMILY HISTORY:      Review of Systems  CONSTITUTIONAL:  Fevers[ ] chills[ ] sweats[ ] fatigue[ ] weight loss[ ] weight gain [ ]                                     NEGATIVE [X ]   NEURO:  parathesias[ ] seizures [ ]  syncope [ ]  confusion [ ]                                                                                NEGATIVE[ X]   EYES: glasses[ ]  blurry vision[ ]  discharge[ ] pain[ ] glaucoma [ ]                                                                          NEGATIVE[X ]   ENMT:  difficulty hearing [ ]  vertigo[ ]  dysphagia[ ] epistaxis[ ] recent dental work [ ]                                    NEGATIVE[ X]   CV:  chest pain[ ] palpitations[ ] VARGAS [ ] diaphoresis [ ]                                                                                           NEGATIVE[ X]   RESPIRATORY:  wheezing[ ] SOB[ ] cough [ ] sputum[ ] hemoptysis[ ]                                                                  NEGATIVE[ ]   GI:  nausea[ ]  vommiting [ ]  diarrhea[ ] constipation [ ] melena [ ]                                                                      NEGATIVE[ X]   : hematuria[ ]  dysuria[ ] urgency[ ] incontinence[ ]                                                                                            NEGATIVE[ X]   MUSKULOSKELETAL:  arthritis[ ]  joint swelling [ ] muscle weakness [ ] Hx vein stripping [ ]                             NEGATIVE[X ]   SKIN/BREAST:  rash[ ] itching [ ]  hair loss[ ] masses[ ]                                                                                              NEGATIVE[ X]   PSYCH:  dementia [ ] depresion [ ] anxiety[ ]                                                                                                               NEGATIVE[X ]   HEME/LYMPH:  bruises easily[ ] enlarged lymph nodes[ ] tender lymph nodes[ ]                                               NEGATIVE[ X]   ENDOCRINE:  cold intolerance[ ] heat intolerance[ ] polydipsia[ ]                                                                          NEGATIVE[ X]     PHYSICAL EXAM  Vital Signs Last 24 Hrs  T(C): 35.9 (12 Sep 2021 13:50), Max: 35.9 (12 Sep 2021 13:50)  T(F): 96.7 (12 Sep 2021 13:50), Max: 96.7 (12 Sep 2021 13:50)  HR: 108 (12 Sep 2021 13:50) (108 - 108)  BP: 116/73 (12 Sep 2021 13:50) (116/73 - 116/73)  BP(mean): --  RR: 18 (12 Sep 2021 13:50) (18 - 18)  SpO2: 97% (12 Sep 2021 13:50) (97% - 97%)  Right arm bp: Left arm bp;    CONSTITUTIONAL:                                                                          WNL[x)  Neuro: WNL[ ] Normal exam oriented to person/place & time with no focal motor or sensory  deficits. Other                     Eyes: WNL[ ]   Normal exam of conjunctiva & lids, pupils equally reactive. Other     ENT: WNL[ ]    Normal exam of nasal/oral mucosa with absence of cyanosis. Other  Neck: WNL[ ]  Normal exam of jugular veins, trachea & thyroid. Other  Chest: WNL[ ] Normal lung exam with good air movement absence of wheezes, rales, or rhonchi: Other                                                                                CV:  Auscultation: normal [ ] S3[ ] S4[ ] Irregular [ ] Rub[ ] Clicks[ ]  -- dec bs at right base  Murmurs none:[ ]systolic [ ]  diastolic [ ] holosystolic [ ]  Carotids: No Bruits[ ] Other                 Abdominal Aorta: normal [ ] nonpalpable[ ]Other                                                                                      GI:           WNL[ ] Normal exam of abdomen, liver & spleen with no noted masses or tenderness. Other                                                                                                        Extremities: WNL[ ] Normal no evidence of cyanosis or deformity Edema: none[ ]trace[ ]1+[ ]2+[ ]3+[ ]4+[ ]  Lower Extremity Pulses: Right[ ] Left[ ]Varicosities[ ]  SKIN :WNL[ ] Normal exam to inspection & palation. Other:                                                          LABS:    CXR: moderate right pleural effusion; official report to follow    Assessment/ Plan:  Patient is a 80 yo male s/p cabg x 4 on 8/12/21 with post op course complicated by a. fib.  Pt now returns to the ER for a second time after complaining of shortness of breath secondary to a right pleural effusion  cont present tx as per medical intensivist  will perform right pleural thoracentesis now with intensivist and hold eliquis  will have pt place in observation unit and then repeat cxr in am  resume lasix  a. fib cont amio and b blocker

## 2021-09-12 NOTE — ED CDU PROVIDER INITIAL DAY NOTE - OBJECTIVE STATEMENT
82 y/o M, PMHx CAD- s/p CABG - 8/18 (Dr Smith); s/p PCIx3 (2001/2010), A. Fib (on Eliquis), HTN, & HLD 80 y/o M, PMHx CAD- s/p CABG - 8/18 (Dr Smith); s/p PCIx3 (2001/2010), A. Fib (on Eliquis), HTN, & HLD, presents to the ED with complaints of increasing shortness of breath. CXR revealed a moderate sized right pleural effusion and a bedside thoracentesis was performed by intensivist with improvement. Observation status recommended for repeat labs and cxr in the AM. Patient took all his home medications and Eliquis is to be held tonight. He denies chest pain, nausea, vomiting, fever and chills.

## 2021-09-12 NOTE — ED CLERICAL - NS ED CLERK NOTE PRE-ARRIVAL INFORMATION; ADDITIONAL PRE-ARRIVAL INFORMATION
This patient is enrolled in the [facility] CABG readmission reduction program and has active care navigation.  This patient can be followed up by the care navigation team within 24 hours. To arrange close follow-up or to obtain additional clinical information about this patient, please call the contact number above.

## 2021-09-12 NOTE — ED CDU PROVIDER INITIAL DAY NOTE - MEDICAL DECISION MAKING DETAILS
Patient feeling better post thoracentesis.  No fever.  No hypoxia.  Awaiting repeat CXR and reassessment by CT surgery tomorrow.  Remains on continuous telemetry.

## 2021-09-12 NOTE — ED PROVIDER NOTE - ATTENDING CONTRIBUTION TO CARE
80 yo m with pmh of htn, afib on eliquis cad s/p cabg on 8/12 by dr. josue, presents with weakness and sob.  as per wife, pt had been having worsening moyer and orthopnea and went to cardio office last week.  pt was given rx for lasix x 3 days.  pt says he felt slightly better on the medication.  pt's symptoms have returned, with leg swelling.  no fever, chills, n/v/d, abd pain, cp.  exam: nad, ncat, perrl, eomi, mmm, rrr, mild bibasilar rales, abd soft, nt,nd aox3, +pitting edema imp: pt with recent cabg, here with sob and orthopnea, concern for fluid overload, will check labs, cxr, ekg, CT surg consult 80 yo m with pmh of htn, afib on eliquis cad s/p cabg on 8/12 by dr. josue, presents with weakness and sob.  as per wife, pt had been having worsening moyer and orthopnea and went to cardio office last week.  pt was given rx for lasix x 3 days.  pt says he felt slightly better on the medication.  pt's symptoms have returned, with leg swelling.  no fever, chills, n/v/d, abd pain, cp.  exam: nad, ncat, perrl, eomi, mmm, rrr, dec bs R base, abd soft, nt,nd aox3, +pitting edema imp: pt with recent cabg, here with sob and orthopnea, concern for fluid overload, will check labs, cxr, ekg, CT surg consult

## 2021-09-12 NOTE — ED ADULT NURSE NOTE - OBJECTIVE STATEMENT
Pt presented with c/o SOB x1mo; however, progressively worse over the past week. Pt recently dc from hospital in august s/p CABG x4. Pt endorses cp worse with inspiration. Denies n/v/fever.

## 2021-09-12 NOTE — ED PROVIDER NOTE - CLINICAL SUMMARY MEDICAL DECISION MAKING FREE TEXT BOX
Pt with recent cabg, R pleural effusion on cxr, ct surg consulted and thoracentesis done at bedside and pt to be placed in obs as per ct surg

## 2021-09-12 NOTE — ED PROVIDER NOTE - OBJECTIVE STATEMENT
80 yo male with a pmh of CAD s/p  cabg x 4 on 8/12/21, PCI x 3 (2001x1; 2010 x2), afib on Eliquis, HTN and HLD presents c/o SOB. pt states to have experienced SOB for the past month and noted worsening over the past week. pt denies any other symptoms including fevers, chill, headache, recent illness/travel, cough, abdominal pain, or chest pain.

## 2021-09-13 VITALS
DIASTOLIC BLOOD PRESSURE: 80 MMHG | TEMPERATURE: 97 F | SYSTOLIC BLOOD PRESSURE: 132 MMHG | RESPIRATION RATE: 18 BRPM | OXYGEN SATURATION: 97 % | HEART RATE: 72 BPM

## 2021-09-13 LAB
ANION GAP SERPL CALC-SCNC: 8 MMOL/L — SIGNIFICANT CHANGE UP (ref 7–14)
BUN SERPL-MCNC: 13 MG/DL — SIGNIFICANT CHANGE UP (ref 10–20)
CALCIUM SERPL-MCNC: 9.2 MG/DL — SIGNIFICANT CHANGE UP (ref 8.5–10.1)
CHLORIDE SERPL-SCNC: 97 MMOL/L — LOW (ref 98–110)
CO2 SERPL-SCNC: 29 MMOL/L — SIGNIFICANT CHANGE UP (ref 17–32)
CREAT SERPL-MCNC: 0.9 MG/DL — SIGNIFICANT CHANGE UP (ref 0.7–1.5)
GLUCOSE SERPL-MCNC: 129 MG/DL — HIGH (ref 70–99)
HCT VFR BLD CALC: 40.5 % — LOW (ref 42–52)
HGB BLD-MCNC: 13 G/DL — LOW (ref 14–18)
MCHC RBC-ENTMCNC: 29.3 PG — SIGNIFICANT CHANGE UP (ref 27–31)
MCHC RBC-ENTMCNC: 32.1 G/DL — SIGNIFICANT CHANGE UP (ref 32–37)
MCV RBC AUTO: 91.4 FL — SIGNIFICANT CHANGE UP (ref 80–94)
NRBC # BLD: 0 /100 WBCS — SIGNIFICANT CHANGE UP (ref 0–0)
PLATELET # BLD AUTO: 269 K/UL — SIGNIFICANT CHANGE UP (ref 130–400)
POTASSIUM SERPL-MCNC: 4.5 MMOL/L — SIGNIFICANT CHANGE UP (ref 3.5–5)
POTASSIUM SERPL-SCNC: 4.5 MMOL/L — SIGNIFICANT CHANGE UP (ref 3.5–5)
RBC # BLD: 4.43 M/UL — LOW (ref 4.7–6.1)
RBC # FLD: 12.2 % — SIGNIFICANT CHANGE UP (ref 11.5–14.5)
SODIUM SERPL-SCNC: 134 MMOL/L — LOW (ref 135–146)
WBC # BLD: 8.43 K/UL — SIGNIFICANT CHANGE UP (ref 4.8–10.8)
WBC # FLD AUTO: 8.43 K/UL — SIGNIFICANT CHANGE UP (ref 4.8–10.8)

## 2021-09-13 PROCEDURE — 71045 X-RAY EXAM CHEST 1 VIEW: CPT | Mod: 26

## 2021-09-13 PROCEDURE — 99217: CPT

## 2021-09-13 RX ORDER — FUROSEMIDE 40 MG
1 TABLET ORAL
Qty: 7 | Refills: 0
Start: 2021-09-13 | End: 2021-09-19

## 2021-09-13 RX ADMIN — AMIODARONE HYDROCHLORIDE 200 MILLIGRAM(S): 400 TABLET ORAL at 05:47

## 2021-09-13 RX ADMIN — Medication 25 MILLIGRAM(S): at 13:34

## 2021-09-13 RX ADMIN — Medication 40 MILLIGRAM(S): at 05:47

## 2021-09-13 RX ADMIN — Medication 25 MILLIGRAM(S): at 05:47

## 2021-09-13 NOTE — ED ADULT NURSE REASSESSMENT NOTE - NS ED NURSE REASSESS COMMENT FT1
Pt assessed A/O times 4 Vs stable on cardiac monitor denies chest pain denies SOB no n/V no dizziness , assistance provide ambulated to the bathroom with RN , breakfast tray provide did eat 75% of breakfast safety precaution on progress ,on going nursing observiition.

## 2021-09-13 NOTE — ED CDU PROVIDER DISPOSITION NOTE - NSFOLLOWUPINSTRUCTIONS_ED_ALL_ED_FT
YOU HAVE AN APPOINTMENT WITH DR. FARRELL IN 2 DAYS ON WEDNESDAY AT 1:30PM. YOU HAVE BEEN GIVEN A SCRIPT FOR A CHEST X-RAY, PLEASE OBTAIN THIS PRIOR TO APPOINTMENT ON WEDNESDAY.     RESTART YOUR ASPIRIN 325MG AND LIPITOR 40MG DAILY    STOP ELIQUIS          Thoracentesis    WHAT YOU NEED TO KNOW:    A thoracentesis is a procedure to remove extra fluid or air from between your lungs and your inner chest wall. Air or fluid buildup may make it hard for you to breathe. A thoracentesis allows your lungs to expand fully so you can breathe more easily.    DISCHARGE INSTRUCTIONS:    Seek care immediately if:   •Blood soaks through your bandage.      •There is blood in your saliva.      •You had a chest tube removed and your stitches come apart.      •You have sudden trouble breathing.      •You have severe chest pain.      Contact your healthcare provider if:   •You have a fever.      •Your puncture site is red, warm, swollen, or draining pus.      •You have questions or concerns about your procedure, medicine, or care.      Medicines: You may need any of the following:   •Prescription pain medicine may be given. Ask your healthcare provider how to take this medicine safely. Some prescription pain medicines contain acetaminophen. Do not take other medicines that contain acetaminophen without talking to your healthcare provider. Too much acetaminophen may cause liver damage. Prescription pain medicine may cause constipation. Ask your healthcare provider how to prevent or treat constipation.       •Antibiotics help fight or prevent an infection.      •Take your medicine as directed. Contact your healthcare provider if you think your medicine is not helping or if you have side effects. Tell him or her if you are allergic to any medicine. Keep a list of the medicines, vitamins, and herbs you take. Include the amounts, and when and why you take them. Bring the list or the pill bottles to follow-up visits. Carry your medicine list with you in case of an emergency.      Self-care:   •Rest as needed. Return to your daily activities as directed.       •Do not smoke. Nicotine and other chemicals in cigarettes, e-cigarettes, and cigars can cause lung damage. Ask your healthcare provider for information if you currently smoke and need help to quit. Smokeless tobacco still contains nicotine. Talk to your healthcare provider before you use these products.      Follow up with your doctor as directed: Write down your questions so you remember to ask them during your visits.

## 2021-09-13 NOTE — PROGRESS NOTE ADULT - SUBJECTIVE AND OBJECTIVE BOX
OPERATIVE PROCEDURE(s):   cabg on 8/12 now readmitted to observation after right pleural thoracentesis            SURGEON(s): joselo    SUBJECTIVE ASSESSMENT: pt is without complaints and states that he feels much better after having a tap that evacuated approx 1.7 L of dark bloody drainage from his right lung.  He is now able to lie flat comfortably and is no longer short of breath.  He is agreeable to go home this afternoon when his wife is able to come and pick him up    Vital Signs Last 24 Hrs  T(C): 36.1 (13 Sep 2021 08:01), Max: 36.1 (13 Sep 2021 08:01)  T(F): 96.9 (13 Sep 2021 08:01), Max: 96.9 (13 Sep 2021 08:01)  HR: 66 (13 Sep 2021 08:01) (66 - 108)  BP: 136/78 (13 Sep 2021 08:01) (116/73 - 136/78)  BP(mean): --  RR: 18 (13 Sep 2021 08:01) (16 - 18)  SpO2: 97% (13 Sep 2021 08:01) (96% - 98%)    Physical Exam  General: alert and oriented x 3 in nad  Chest: cta bl trace crackles at right base  CVS: s1s2  Abd: pos bs soft nt  GI/ :   Ext: trace ankle edema  incisions-c/d/i  sternum- intact    LABS:                        13.0   8.43  )-----------( 269      ( 13 Sep 2021 05:47 )             40.5     COUMADIN:   [ ] YES [x ] NO      09-13    134<L>  |  97<L>  |  13  ----------------------------<  129<H>  4.5   |  29  |  0.9    Ca    9.2      13 Sep 2021 05:47  Mg     2.1     09-12    TPro  6.3  /  Alb  3.6  /  TBili  0.4  /  DBili  x   /  AST  19  /  ALT  16  /  AlkPhos  93  09-12    MEDICATIONS  (STANDING):  aMIOdarone    Tablet 200 milliGRAM(s) Oral daily  furosemide    Tablet 40 milliGRAM(s) Oral daily  metoprolol tartrate 25 milliGRAM(s) Oral every 8 hours    MEDICATIONS  (PRN):    Allergies    No Known Allergies    Intolerances        Ambulation/Activity Status:  amb well       RADIOLOGY & ADDITIONAL TESTS:    EXAM:  XR CHEST PORTABLE IMMED 1V            PROCEDURE DATE:  09/12/2021            INTERPRETATION:  Clinical History / Reason for exam: Shortness of breath    Comparison : Chest radiograph 9/12/2021.    Technique/Positioning: Frontal chest radiograph.    Findings:    Support devices: None.    Cardiac/mediastinum/hilum: Unchanged    Lung parenchyma/Pleura: Right-sided opacities/effusion, slightly increased from prior. No pneumothorax.    Skeleton/soft tissues: Unchanged    Impression:    Right-sided opacities/effusion, slightly increased from prior.    --- End of Report ---    Assessment/Plan: Patient is a 82 yo male s/p cabg whose post op course was complicated by a. fib.  He was on Eliquis and developed a significant right pleural effusion that was evacuated yesterday-- pt is now feeling better and is agreeable to go home  cont present tx as per ct surgeon/ER MD  s/p right thoracentesis - will d/c Eliquis now due to bleeding complication  s/p cabg- cont b blocker; may resume statin due to normalization of lft's and may resume asa now that he is off the Eliquis  post op a. fib- pt is in SR; no need for Eliquis at the present time-- left atrial appendage was clipped; cont asa; cont amio   f/u with cardiac surgery on 9/15 at 1:30pm to see dr josue after repeat cxr to rule out reaccumulation of pleural effusion-- pt was instructed to return to ER if he develops any acute chest pain and/or shortness of breath  recall prn spectra 1158  may cont mild diuresis  may proceed with discharge      Social Service Disposition:  home today with wife   OPERATIVE PROCEDURE(s):   cabg on 8/12 now readmitted to observation after right pleural thoracentesis            SURGEON(s): joselo    SUBJECTIVE ASSESSMENT: pt is without complaints and states that he feels much better after having a tap that evacuated approx 1.7 L of dark bloody drainage from his right lung.  He is now able to lie flat comfortably and is no longer short of breath.  He is agreeable to go home this afternoon when his wife is able to come and pick him up    Vital Signs Last 24 Hrs  T(C): 36.1 (13 Sep 2021 08:01), Max: 36.1 (13 Sep 2021 08:01)  T(F): 96.9 (13 Sep 2021 08:01), Max: 96.9 (13 Sep 2021 08:01)  HR: 66 (13 Sep 2021 08:01) (66 - 108)  BP: 136/78 (13 Sep 2021 08:01) (116/73 - 136/78)  BP(mean): --  RR: 18 (13 Sep 2021 08:01) (16 - 18)  SpO2: 97% (13 Sep 2021 08:01) (96% - 98%)    Physical Exam  General: alert and oriented x 3 in nad  Chest: cta bl trace crackles at right base  CVS: s1s2  Abd: pos bs soft nt  GI/ :   Ext: trace ankle edema  incisions-c/d/i  sternum- intact    LABS:                        13.0   8.43  )-----------( 269      ( 13 Sep 2021 05:47 )             40.5     COUMADIN:   [ ] YES [x ] NO      09-13    134<L>  |  97<L>  |  13  ----------------------------<  129<H>  4.5   |  29  |  0.9    Ca    9.2      13 Sep 2021 05:47  Mg     2.1     09-12    TPro  6.3  /  Alb  3.6  /  TBili  0.4  /  DBili  x   /  AST  19  /  ALT  16  /  AlkPhos  93  09-12    MEDICATIONS  (STANDING):  aMIOdarone    Tablet 200 milliGRAM(s) Oral daily  furosemide    Tablet 40 milliGRAM(s) Oral daily  metoprolol tartrate 25 milliGRAM(s) Oral every 8 hours    MEDICATIONS  (PRN):    Allergies    No Known Allergies    Intolerances        Ambulation/Activity Status:  amb well       RADIOLOGY & ADDITIONAL TESTS:    EXAM:  XR CHEST PORTABLE IMMED 1V            PROCEDURE DATE:  09/12/2021            INTERPRETATION:  Clinical History / Reason for exam: Shortness of breath    Comparison : Chest radiograph 9/12/2021.    Technique/Positioning: Frontal chest radiograph.    Findings:    Support devices: None.    Cardiac/mediastinum/hilum: Unchanged    Lung parenchyma/Pleura: Right-sided opacities/effusion, slightly increased from prior. No pneumothorax.    Skeleton/soft tissues: Unchanged    Impression:    Right-sided opacities/effusion, slightly increased from prior.    --- End of Report ---    Assessment/Plan: Patient is a 82 yo male s/p cabg whose post op course was complicated by a. fib.  He was on Eliquis and developed a significant right pleural effusion that was evacuated yesterday-- pt is now feeling better and is agreeable to go home  cont present tx as per ct surgeon/ER MD  s/p right thoracentesis - will d/c Eliquis now due to bleeding complication  s/p cabg- cont b blocker; may resume statin due to normalization of lft's and may resume asa now that he is off the Eliquis  post op a. fib- pt is in SR; no need for Eliquis at the present time-- left atrial appendage was clipped; cont asa; cont amio   f/u with cardiac surgery on 9/15 at 1:30pm to see dr josue after repeat cxr to rule out reaccumulation of pleural effusion-- pt was instructed to return to ER if he develops any acute chest pain and/or shortness of breath  recall prn spectra 1158  may cont mild diuresis  may proceed with discharge  plan was discussed and communicated with ER ARE[facility] Lovell    Social Service Disposition:  home today with wife

## 2021-09-13 NOTE — ED CDU PROVIDER SUBSEQUENT DAY NOTE - PROGRESS NOTE DETAILS
Received pt from [facility], CABG 8/12/21, presented to ED worsening SOB, underwent thoracentesis at bedside last night by CT Sx, 1700ml drained. Repeat Hgb and CXR much improved. Pt reports feeling better, awaiting CT Sx dispo Evaluated by CT Sx-- Stop Eliquis, re-start ASA 325mg daily and Lipitor 40mg. Has appointment with Dr. Larsen in 2 days, given script for CXR. This was communicated to pt. He reports his wife cannot pick him up until 1630. d/w CT surgery and patient's wife, also re-start furosemide for the next 3 days. Declines

## 2021-09-13 NOTE — ED ADULT NURSE REASSESSMENT NOTE - NS ED NURSE REASSESS COMMENT FT1
Pt reassessed report no chest pain no SOB no N/V no dizziness on cardiac monitor denies chest pain no SOB no N/V no dizziness assistance provide did eat 75% medication is given as per order ,pt is seen evaluate by Ed attending clear to go home ready to be discharge home with family members NAD noted.

## 2021-09-13 NOTE — ED CDU PROVIDER DISPOSITION NOTE - CLINICAL COURSE
Patient without complaint.  No dyspnea.  Seen and reevaluated by CT surgery.  Based on CXR, no need for another thoracentesis.  Strict return instructions discussed.  F/U with Dr. Smith as scheduled.

## 2021-09-13 NOTE — ED CDU PROVIDER DISPOSITION NOTE - PATIENT PORTAL LINK FT
You can access the FollowMyHealth Patient Portal offered by [facility] by registering at the following website: [email or web address]. By joining [employer]’s FollowMyHealth portal, you will also be able to view your health information using other applications (apps) compatible with our system.

## 2021-09-13 NOTE — ED CDU PROVIDER DISPOSITION NOTE - CARE PROVIDER_API CALL
Keon Smith)  Surgery; Thoracic and Cardiac Surgery  [address] [place] [address], [facility] [address]  [place] [address]  Phone: [phone]  Fax: [phone]  Follow Up Time:

## 2021-09-14 ENCOUNTER — TRANSCRIPTION ENCOUNTER (OUTPATIENT)
Age: 81
End: 2021-09-14

## 2021-09-14 ENCOUNTER — EMERGENCY (EMERGENCY)
Facility: HOSPITAL | Age: 81
LOS: 0 days | Discharge: HOME | End: 2021-09-14
Attending: EMERGENCY MEDICINE | Admitting: EMERGENCY MEDICINE
Payer: MEDICAID

## 2021-09-14 VITALS
HEART RATE: 130 BPM | OXYGEN SATURATION: 99 % | WEIGHT: 153 LBS | DIASTOLIC BLOOD PRESSURE: 56 MMHG | HEIGHT: 64.57 IN | TEMPERATURE: 98 F | SYSTOLIC BLOOD PRESSURE: 116 MMHG | RESPIRATION RATE: 18 BRPM

## 2021-09-14 DIAGNOSIS — R05 COUGH: ICD-10-CM

## 2021-09-14 DIAGNOSIS — J90 PLEURAL EFFUSION, NOT ELSEWHERE CLASSIFIED: ICD-10-CM

## 2021-09-14 DIAGNOSIS — Z79.899 OTHER LONG TERM (CURRENT) DRUG THERAPY: ICD-10-CM

## 2021-09-14 DIAGNOSIS — R07.89 OTHER CHEST PAIN: ICD-10-CM

## 2021-09-14 DIAGNOSIS — R42 DIZZINESS AND GIDDINESS: ICD-10-CM

## 2021-09-14 DIAGNOSIS — Z20.822 CONTACT WITH AND (SUSPECTED) EXPOSURE TO COVID-19: ICD-10-CM

## 2021-09-14 DIAGNOSIS — Z95.1 PRESENCE OF AORTOCORONARY BYPASS GRAFT: ICD-10-CM

## 2021-09-14 DIAGNOSIS — Z98.890 OTHER SPECIFIED POSTPROCEDURAL STATES: ICD-10-CM

## 2021-09-14 DIAGNOSIS — I10 ESSENTIAL (PRIMARY) HYPERTENSION: ICD-10-CM

## 2021-09-14 DIAGNOSIS — R06.02 SHORTNESS OF BREATH: ICD-10-CM

## 2021-09-14 DIAGNOSIS — Z79.01 LONG TERM (CURRENT) USE OF ANTICOAGULANTS: ICD-10-CM

## 2021-09-14 DIAGNOSIS — E78.5 HYPERLIPIDEMIA, UNSPECIFIED: ICD-10-CM

## 2021-09-14 DIAGNOSIS — I44.30 UNSPECIFIED ATRIOVENTRICULAR BLOCK: ICD-10-CM

## 2021-09-14 DIAGNOSIS — I25.10 ATHEROSCLEROTIC HEART DISEASE OF NATIVE CORONARY ARTERY WITHOUT ANGINA PECTORIS: ICD-10-CM

## 2021-09-14 DIAGNOSIS — Z79.82 LONG TERM (CURRENT) USE OF ASPIRIN: ICD-10-CM

## 2021-09-14 DIAGNOSIS — Z82.49 FAMILY HISTORY OF ISCHEMIC HEART DISEASE AND OTHER DISEASES OF THE CIRCULATORY SYSTEM: ICD-10-CM

## 2021-09-14 DIAGNOSIS — Z79.891 LONG TERM (CURRENT) USE OF OPIATE ANALGESIC: ICD-10-CM

## 2021-09-14 DIAGNOSIS — R00.0 TACHYCARDIA, UNSPECIFIED: ICD-10-CM

## 2021-09-14 DIAGNOSIS — I48.91 UNSPECIFIED ATRIAL FIBRILLATION: ICD-10-CM

## 2021-09-14 DIAGNOSIS — R94.31 ABNORMAL ELECTROCARDIOGRAM [ECG] [EKG]: ICD-10-CM

## 2021-09-14 LAB
ALBUMIN SERPL ELPH-MCNC: 3.8 G/DL — SIGNIFICANT CHANGE UP (ref 3.5–5.2)
ALP SERPL-CCNC: 96 U/L — SIGNIFICANT CHANGE UP (ref 30–115)
ALT FLD-CCNC: 17 U/L — SIGNIFICANT CHANGE UP (ref 0–41)
ANION GAP SERPL CALC-SCNC: 13 MMOL/L — SIGNIFICANT CHANGE UP (ref 7–14)
AST SERPL-CCNC: 22 U/L — SIGNIFICANT CHANGE UP (ref 0–41)
BASOPHILS # BLD AUTO: 0.02 K/UL — SIGNIFICANT CHANGE UP (ref 0–0.2)
BASOPHILS NFR BLD AUTO: 0.2 % — SIGNIFICANT CHANGE UP (ref 0–1)
BILIRUB SERPL-MCNC: 0.8 MG/DL — SIGNIFICANT CHANGE UP (ref 0.2–1.2)
BUN SERPL-MCNC: 20 MG/DL — SIGNIFICANT CHANGE UP (ref 10–20)
CALCIUM SERPL-MCNC: 9.3 MG/DL — SIGNIFICANT CHANGE UP (ref 8.5–10.1)
CHLORIDE SERPL-SCNC: 94 MMOL/L — LOW (ref 98–110)
CO2 SERPL-SCNC: 25 MMOL/L — SIGNIFICANT CHANGE UP (ref 17–32)
CREAT SERPL-MCNC: 1.2 MG/DL — SIGNIFICANT CHANGE UP (ref 0.7–1.5)
EOSINOPHIL # BLD AUTO: 0.3 K/UL — SIGNIFICANT CHANGE UP (ref 0–0.7)
EOSINOPHIL NFR BLD AUTO: 3.5 % — SIGNIFICANT CHANGE UP (ref 0–8)
GLUCOSE SERPL-MCNC: 182 MG/DL — HIGH (ref 70–99)
HCT VFR BLD CALC: 39.8 % — LOW (ref 42–52)
HGB BLD-MCNC: 13.1 G/DL — LOW (ref 14–18)
IMM GRANULOCYTES NFR BLD AUTO: 0.5 % — HIGH (ref 0.1–0.3)
LYMPHOCYTES # BLD AUTO: 1.24 K/UL — SIGNIFICANT CHANGE UP (ref 1.2–3.4)
LYMPHOCYTES # BLD AUTO: 14.4 % — LOW (ref 20.5–51.1)
MAGNESIUM SERPL-MCNC: 2 MG/DL — SIGNIFICANT CHANGE UP (ref 1.8–2.4)
MCHC RBC-ENTMCNC: 29.7 PG — SIGNIFICANT CHANGE UP (ref 27–31)
MCHC RBC-ENTMCNC: 32.9 G/DL — SIGNIFICANT CHANGE UP (ref 32–37)
MCV RBC AUTO: 90.2 FL — SIGNIFICANT CHANGE UP (ref 80–94)
MONOCYTES # BLD AUTO: 0.76 K/UL — HIGH (ref 0.1–0.6)
MONOCYTES NFR BLD AUTO: 8.8 % — SIGNIFICANT CHANGE UP (ref 1.7–9.3)
NEUTROPHILS # BLD AUTO: 6.28 K/UL — SIGNIFICANT CHANGE UP (ref 1.4–6.5)
NEUTROPHILS NFR BLD AUTO: 72.6 % — SIGNIFICANT CHANGE UP (ref 42.2–75.2)
NRBC # BLD: 0 /100 WBCS — SIGNIFICANT CHANGE UP (ref 0–0)
NT-PROBNP SERPL-SCNC: 683 PG/ML — HIGH (ref 0–300)
PLATELET # BLD AUTO: 308 K/UL — SIGNIFICANT CHANGE UP (ref 130–400)
POTASSIUM SERPL-MCNC: 4 MMOL/L — SIGNIFICANT CHANGE UP (ref 3.5–5)
POTASSIUM SERPL-SCNC: 4 MMOL/L — SIGNIFICANT CHANGE UP (ref 3.5–5)
PROT SERPL-MCNC: 6.6 G/DL — SIGNIFICANT CHANGE UP (ref 6–8)
RBC # BLD: 4.41 M/UL — LOW (ref 4.7–6.1)
RBC # FLD: 12.2 % — SIGNIFICANT CHANGE UP (ref 11.5–14.5)
SARS-COV-2 RNA SPEC QL NAA+PROBE: SIGNIFICANT CHANGE UP
SODIUM SERPL-SCNC: 132 MMOL/L — LOW (ref 135–146)
TROPONIN T SERPL-MCNC: 0.02 NG/ML — HIGH
TROPONIN T SERPL-MCNC: 0.02 NG/ML — HIGH
WBC # BLD: 8.64 K/UL — SIGNIFICANT CHANGE UP (ref 4.8–10.8)
WBC # FLD AUTO: 8.64 K/UL — SIGNIFICANT CHANGE UP (ref 4.8–10.8)

## 2021-09-14 PROCEDURE — G1004: CPT

## 2021-09-14 PROCEDURE — 99220: CPT

## 2021-09-14 PROCEDURE — 93325 DOPPLER ECHO COLOR FLOW MAPG: CPT | Mod: 26

## 2021-09-14 PROCEDURE — 71275 CT ANGIOGRAPHY CHEST: CPT | Mod: 26,ME

## 2021-09-14 PROCEDURE — 99285 EMERGENCY DEPT VISIT HI MDM: CPT | Mod: 57

## 2021-09-14 PROCEDURE — 93010 ELECTROCARDIOGRAM REPORT: CPT

## 2021-09-14 PROCEDURE — 93312 ECHO TRANSESOPHAGEAL: CPT | Mod: 26

## 2021-09-14 PROCEDURE — 93010 ELECTROCARDIOGRAM REPORT: CPT | Mod: 77

## 2021-09-14 PROCEDURE — 93320 DOPPLER ECHO COMPLETE: CPT | Mod: 26

## 2021-09-14 PROCEDURE — 71045 X-RAY EXAM CHEST 1 VIEW: CPT | Mod: 26

## 2021-09-14 PROCEDURE — 92960 CARDIOVERSION ELECTRIC EXT: CPT

## 2021-09-14 RX ORDER — ASPIRIN/CALCIUM CARB/MAGNESIUM 324 MG
325 TABLET ORAL DAILY
Refills: 0 | Status: DISCONTINUED | OUTPATIENT
Start: 2021-09-15 | End: 2021-09-14

## 2021-09-14 RX ORDER — HEPARIN SODIUM 5000 [USP'U]/ML
5000 INJECTION INTRAVENOUS; SUBCUTANEOUS EVERY 8 HOURS
Refills: 0 | Status: DISCONTINUED | OUTPATIENT
Start: 2021-09-14 | End: 2021-09-14

## 2021-09-14 RX ORDER — METOPROLOL TARTRATE 50 MG
25 TABLET ORAL EVERY 8 HOURS
Refills: 0 | Status: DISCONTINUED | OUTPATIENT
Start: 2021-09-14 | End: 2021-09-14

## 2021-09-14 RX ORDER — FUROSEMIDE 40 MG
40 TABLET ORAL DAILY
Refills: 0 | Status: DISCONTINUED | OUTPATIENT
Start: 2021-09-15 | End: 2021-09-14

## 2021-09-14 RX ORDER — SODIUM CHLORIDE 9 MG/ML
500 INJECTION, SOLUTION INTRAVENOUS ONCE
Refills: 0 | Status: COMPLETED | OUTPATIENT
Start: 2021-09-14 | End: 2021-09-14

## 2021-09-14 RX ORDER — FUROSEMIDE 40 MG
1 TABLET ORAL
Qty: 10 | Refills: 0
Start: 2021-09-14 | End: 2021-09-23

## 2021-09-14 RX ORDER — PANTOPRAZOLE SODIUM 20 MG/1
40 TABLET, DELAYED RELEASE ORAL
Refills: 0 | Status: DISCONTINUED | OUTPATIENT
Start: 2021-09-14 | End: 2021-09-14

## 2021-09-14 RX ORDER — SODIUM CHLORIDE 9 MG/ML
1000 INJECTION INTRAMUSCULAR; INTRAVENOUS; SUBCUTANEOUS
Refills: 0 | Status: DISCONTINUED | OUTPATIENT
Start: 2021-09-14 | End: 2021-09-14

## 2021-09-14 RX ORDER — ATORVASTATIN CALCIUM 80 MG/1
20 TABLET, FILM COATED ORAL AT BEDTIME
Refills: 0 | Status: DISCONTINUED | OUTPATIENT
Start: 2021-09-14 | End: 2021-09-14

## 2021-09-14 RX ORDER — METOPROLOL TARTRATE 50 MG
5 TABLET ORAL ONCE
Refills: 0 | Status: COMPLETED | OUTPATIENT
Start: 2021-09-14 | End: 2021-09-14

## 2021-09-14 RX ORDER — APIXABAN 2.5 MG/1
5 TABLET, FILM COATED ORAL EVERY 12 HOURS
Refills: 0 | Status: DISCONTINUED | OUTPATIENT
Start: 2021-09-14 | End: 2021-09-14

## 2021-09-14 RX ORDER — AMIODARONE HYDROCHLORIDE 400 MG/1
200 TABLET ORAL DAILY
Refills: 0 | Status: DISCONTINUED | OUTPATIENT
Start: 2021-09-15 | End: 2021-09-14

## 2021-09-14 RX ADMIN — SODIUM CHLORIDE 500 MILLILITER(S): 9 INJECTION, SOLUTION INTRAVENOUS at 13:18

## 2021-09-14 RX ADMIN — SODIUM CHLORIDE 1000 MILLILITER(S): 9 INJECTION, SOLUTION INTRAVENOUS at 10:46

## 2021-09-14 RX ADMIN — Medication 5 MILLIGRAM(S): at 12:25

## 2021-09-14 RX ADMIN — Medication 25 MILLIGRAM(S): at 13:18

## 2021-09-14 RX ADMIN — Medication 25 MILLIGRAM(S): at 22:19

## 2021-09-14 RX ADMIN — SODIUM CHLORIDE 500 MILLILITER(S): 9 INJECTION, SOLUTION INTRAVENOUS at 15:01

## 2021-09-14 RX ADMIN — APIXABAN 5 MILLIGRAM(S): 2.5 TABLET, FILM COATED ORAL at 17:52

## 2021-09-14 RX ADMIN — ATORVASTATIN CALCIUM 20 MILLIGRAM(S): 80 TABLET, FILM COATED ORAL at 22:19

## 2021-09-14 RX ADMIN — Medication 5 MILLIGRAM(S): at 10:46

## 2021-09-14 NOTE — ED PROVIDER NOTE - CARE PLAN
1 Principal Discharge DX:	Shortness of breath  Secondary Diagnosis:	Atrial fibrillation with RVR  Secondary Diagnosis:	Pleural effusion

## 2021-09-14 NOTE — ED CDU PROVIDER INITIAL DAY NOTE - MEDICAL DECISION MAKING DETAILS
EDOU for Post CABG pathway [place]    81-year-old male presents to the ED for shortness of breath x1 day.  Patient has a recent history of a CABG and was discharged on August 19, 2021.  Patient was in observation care in the ED multiple times for complaints of hemothorax status post thoracentesis on 9/12/2021.  Patient's placement in [place] for current stay is for A. fib with RVR.  Patient initially was rate controlled with metoprolol IV with resumption of A. fib with RVR.  Patient evaluated by me at bedside with heart rate noted to be with A. fib and RVR ranging from 86-1 22.  Blood pressure control.  Patient resting comfortably with no chest pain and minimal shortness of breath.  Physical exam only remarkable for irregularly irregular heart rate.  Lung sounds clear.  Labs reviewed by me and noted to have elevated troponin of 0.02 which is unchanged on repeat.  Elevated BNP of 683.  CT angio PE study revealed cardiomegaly with small to moderate bilateral pleural effusions consistent with CHF.  Case was signed out to me from the prior observation attending .  Evaluated by CT surgery and EP at bedside.  Current plan is for cardioversion in the EP lab.    After cardioversion patient reported mild shortness of breath and after evaluation by CT surgery the decision was made to place the patient in continuous observation till tomorrow and reassess for discharge.

## 2021-09-14 NOTE — ED CDU PROVIDER INITIAL DAY NOTE - PHYSICAL EXAMINATION
VITAL SIGNS: AFebrile, vital signs stable  CONSTITUTIONAL: Well-developed; well-nourished; in no acute distress.  SKIN: Skin exam is warm and dry, no acute rash.  HEAD: Normocephalic; atraumatic.  EYES: Pupils equal round reactive to light, Extraocular movements intact; conjunctiva and sclera clear.  ENT: No nasal discharge; airway clear. Moist mucus membranes.  NECK: Supple; non tender. No rigidity  CARD: irreg irreg, mild tachycardia, well healed midline scar. Normal S1, S2; no murmurs, gallops, or rubs.  RESP: Lungs clear to auscultation bilaterally. No wheezes, rales or rhonchi.  ABD: Abdomen soft; non-tender; non-distended;  no hepatosplenomegaly. No costovertebral angle tenderness.   EXT: Normal ROM. No clubbing, cyanosis or edema. No calf tenderness to palpation.  NEURO: Alert and oriented x 3. No focal deficits.  PSYCH: Cooperative, appropriate.

## 2021-09-14 NOTE — PRE-ANESTHESIA EVALUATION ADULT - NSRADCARDRESULTSFT_GEN_ALL_CORE
TTE 8/2021  Summary:   1. Normal global left ventricular systolic function.   2. LV Ejection Fraction by Maldonado's Method with a biplane EF of 62 %.   3. Normal left ventricular internal cavity size.   4. The mean global longitudinal peak strain by speckle tracking is -17.8% which is borderline normal.   5. Normal left atrial size.   6. Normal right atrial size.   7. No evidence of mitral valve regurgitation.   8. Mild tricuspid regurgitation.   9. Sclerotic aortic valve with normal opening.  10. Mild pulmonic valve regurgitation.  11. Estimated pulmonary artery systolic pressure is 37.8 mmHg assuming a right atrial pressure of 8 mmHg, which is consistent with borderline pulmonary hypertension.  12. LA volume Index is 25.7 ml/m² ml/m2.

## 2021-09-14 NOTE — ED PROVIDER NOTE - ATTENDING CONTRIBUTION TO CARE
80 yo male with a pmh of CAD s/p  cabg x 4 on 8/12/21, PCI x 3 (2001x1; 2010 x2), afib on amiodarone and metoprolol (dose adjusted yesterday), previously on Eliquis but DCed post thoracentesis for hemothorax 9/12, HTN, HLD presents for SOB x1 day. family states pt was very weak and lightheaded today. sx were worse when he tried to walk around. pt came in for eval    vs tachy/irreg  gen- NAD, aaox3  card-irir  lungs-ctab, no wheezing or rhonchi  abd-sntnd, no guarding or rebound  neuro- full str/sensation, cn ii-xii grossly intact, normal coordination   LE- wearing compression stockings, no calf tenderness b/l    will get basic labs, r/o anemia, r/o lyte abn, r/o re-accumulation of pleural fluid, HR increases on mild exertion  will get xr, bedside echo, ct sg c/s, likely obs/admit

## 2021-09-14 NOTE — ED CDU PROVIDER INITIAL DAY NOTE - PROGRESS NOTE DETAILS
Patient still feels SOB, HR down to 90s and up to 110s after medications, CT chest shows no PE but suspicious for CHF and fluid, consulted EP, EP will take patient to EP lab for cardioversion, CT surgery PA aware of plan Patient feels better, HANNAH done and report reviewed, occlusion of left atrial appendage, cardiology Dr. Morris and Dr. Smith aware and agree that patient can be restarted on Eliquis, will give dose here and told spouse and patient that he can restart at home, will d/c Patient feels better, successful cardioversion at EP lab with HR now in 60s, HANNAH done and report reviewed, occlusion of left atrial appendage, cardiology Dr. Morris and Dr. Smith aware and agree that patient can be restarted on Eliquis, will give dose here and told spouse and patient that he can restart at home, will d/c Patient feels better, successful cardioversion at EP lab with HR now in 60s, HANNAH done and report reviewed, occlusion of left atrial appendage, cardiology Dr. Morris and Dr. Smith aware and agree that patient can be restarted on Eliquis Patient still feels SOB, especially on exertion, called CT surgery PA, wife not comfortable going home with patient tonight, wants patient to be monitored overnight, will observe until morning, repeat labs and cxr, and can go home in the morning, CT surgery agree with plan

## 2021-09-14 NOTE — ED PROVIDER NOTE - OBJECTIVE STATEMENT
82 yo male with a pmh of CAD s/p  cabg x 4 on 8/12/21, PCI x 3 (2001x1; 2010 x2), afib on Eliquis, amiodarone, and metoprolol, HTN, HLD, thoracentesis 9/12/21 presents for SOB x 1 day.  Was feeling better after thoracentesis a couple days ago, last night started having increased SOB associated with light-headedness and mild retrosternal chest pain.  No cough, recent illness, N/V, diaphoresis, increased leg pain/swelling, hemoptysis, hx of blood clots.  Had negative duplex study on 9/12. Sees Dr. Davis for cardiology, sees EP in [place] but wants to change EP. 80 yo male with a pmh of CAD s/p  cabg x 4 on 8/12/21, PCI x 3 (2001x1; 2010 x2), afib on Eliquis, amiodarone, and metoprolol, HTN, HLD, thoracentesis 9/12/21 for hemothorax presents for SOB x 1 day.  Was feeling better after thoracentesis a couple days ago, last night started having increased SOB associated with light-headedness and mild retrosternal chest pain.  No cough, recent illness, N/V, diaphoresis, increased leg pain/swelling, hemoptysis, hx of blood clots.  Had negative duplex study on 9/12. Sees Dr. Davis for cardiology, sees EP in [place] but wants to change EP. 80 yo male with a pmh of CAD s/p  cabg x 4 on 8/12/21, PCI x 3 (2001x1; 2010 x2), afib on amiodarone and metoprolol, Eliquis DCed yesterday, HTN, HLD, thoracentesis 9/12/21 for hemothorax presents for SOB x 1 day.  Was feeling better after thoracentesis a couple days ago, last night started having increased SOB associated with light-headedness and mild retrosternal chest pain.  No cough, recent illness, N/V, diaphoresis, increased leg pain/swelling, hemoptysis, hx of blood clots.  Had negative duplex study on 9/12. Sees Dr. Davis for cardiology, sees EP in [place] but wants to change EP.

## 2021-09-14 NOTE — ED PROVIDER NOTE - NS ED ROS FT
Constitutional: No fevers, chills, or malaise.  HEENT: No headache, visual changes, eye pain, hearing changes, ear pain, running nose, or sore throat.  Cardiac:  Reports mild chest pain and SOB. No increased leg edema/pain  Respiratory:  No cough, respiratory distress, or hemoptysis.  GI:  No nausea, vomiting, diarrhea, or abdominal pain.  :  No dysuria, frequency, or urgency.  MS:  No myalgia, muscle weakness, joint pain or back pain.  Neuro: No LOC, paralysis, or N/T.  Skin:  No skin rash.   Endocrine: No polyuria, polyphagia, or polydipsia.

## 2021-09-14 NOTE — PROGRESS NOTE ADULT - SUBJECTIVE AND OBJECTIVE BOX
OPERATIVE PROCEDURE(s):                POD #   33 CABG                    81yMale  SURGEON(s): ABHIJIT Smith  SUBJECTIVE ASSESSMENT: 80 yo male with a pmh of CAD s/p  cabg x 4 on 8/12/21, PCI x 3 (2001x1; 2010 x2), afib on amiodarone and metoprolol (was on Multaq prior to CABG), Eliquis DCed yesterday, HTN, HLD, thoracentesis 9/12/21 for hemothorax presents for SOB x 1 day.  Was feeling better after thoracentesis. last night started having increased SOB associated with light-headedness and mild retrosternal chest pain.  No cough, recent illness, N/V, diaphoresis, increased leg pain/swelling, hemoptysis, hx of blood clots.  Had negative duplex study on 9/12. Sees Dr. Davis for cardiology, sees EP in [place] but wants to change EP. Currently feels fine.    Home medications:     aspirin 81 mg oral delayed release tablet 1 tab(s) orally once a day     Currently on Hold - Eliquis 5 mg oral tablet 1 tab(s) orally 2 times a day     metoprolol tartrate 25 mg oral tablet 1 tab(s) orally every 8 hours     oxycodone-acetaminophen 5 mg-325 mg oral tablet 1 tab(s) orally every 6 hours, As Needed -for moderate pain MDD:6      pantoprazole 40 mg oral delayed release tablet 1 tab(s) orally once a day (before a meal)     tamsulosin 0.4 mg oral capsule 1 cap(s) orally once a day (at bedtime)      Vital Signs Last 24 Hrs  T(F): 98.5 (14 Sep 2021 12:34), Max: 98.5 (14 Sep 2021 12:34)  HR: 104 (14 Sep 2021 12:34) (72 - 148)  BP: 110/67 (14 Sep 2021 12:34) (101/64 - 132/80)  BP(mean): 78 (14 Sep 2021 09:13) (78 - 78)  RR: 18 (14 Sep 2021 10:08) (18 - 18)  SpO2: 97% (14 Sep 2021 10:08) (97% - 100%)    Physical Exam:  General: NAD; A&Ox3  Cardiac: S1/S2, iregRandR, no murmur, no rubs  Lungs: unlabored respirations, CTA b/l, no wheeze, no rales, no crackles  Abdomen: Soft/NT/protuberant  Sternum: Intact, no click, incision healing well with no drainage  Incisions: Incisions clean/dry/intact  Extremities: No edema b/l lower extremities    LABS:                        13.1<L>  8.64  )-----------( 308      ( 14 Sep 2021 08:46 )             39.8<L>                        13.0<L>  8.43  )-----------( 269      ( 13 Sep 2021 05:47 )             40.5<L>    09-14    132<L>  |  94<L>  |  20  ----------------------------<  182<H>  4.0   |  25  |  1.2  09-13    134<L>  |  97<L>  |  13  ----------------------------<  129<H>  4.5   |  29  |  0.9    Ca    9.3      14 Sep 2021 08:46  Mg     2.0     09-14    TPro  6.6 [6.0 - 8.0]  /  Alb  3.8 [3.5 - 5.2]  /  TBili  0.8 [0.2 - 1.2]  /  DBili  x   /  AST  22 [0 - 41]  /  ALT  17 [0 - 41]  /  AlkPhos  96 [30 - 115]  09-14    < from: CT Angio Chest PE Protocol w/ IV Cont (09.14.21 @ 13:37) >  IMPRESSION:  1.  No central or segmental pulmonary embolus.  2.  Cardiomegaly with small-to-moderate bilateral pleural effusions and mild bibasilar interlobular septal thickening, suggestive of CHF/interstitial edema.  3.  Postsurgical changes as above.    < end of copied text >  < from: Xray Chest 1 View- PORTABLE-Urgent (09.14.21 @ 09:59) >  Increased bibasal opacities effusions. No pneumothorax.    Stable cardiomediastinal silhouette. Unchanged osseous structures.    < end of copied text >  < from: 12 Lead ECG (09.14.21 @ 08:16) >  Ventricular Rate 129 BPM    QRS Duration 98 ms    Q-T Interval 354 ms    QTC Calculation(Bazett) 518 ms    R Axis -26 degrees    T Axis 149 degrees    Diagnosis Line Atrial fibrillation with rapid ventricular response  Minimal voltage criteria for LVH, may be normal variant ( Niotaze product )  ST & T wave abnormality, consider lateral ischemia  Abnormal ECG    MEDICATIONS  (STANDING):  metoprolol tartrate 25 milliGRAM(s) Oral every 8 hours    MEDICATIONS  (PRN):    Allergies    No Known Allergies    Intolerances      Ambulation/Activity Status: ambualte as tolerated    Assessment/Plan:  81y Male status-post CABG day 35 with recent right thoracentesis serosanguinous and afib with RVR  - Case and plan discussed with CTU Intensivist and CT Surgeon - Dr. Smith  - Admit to observation unit for rate control of atrial fibrillation  - EP consult -  they were notified  - Get official TTE  - Get CT with PE protocol - this has been completed  - continue to hold Eliquis  - increase ASA to 325 mg while not on PO anticoagulation  - start SC heparin while in observation  - resume home medications  - IV hydration 500cc   - repeat CXR and EKG in AM  - repeat BMP, BNP in AM  - start Atorvastatin 20mg and send home on it.  - patient seen by Dr. Smith  - CTS to follow, PA spectra - 1158 till 19:00, after that ext> [id number] CT Intensivist       Social Service Disposition:  obs

## 2021-09-14 NOTE — CHART NOTE - NSCHARTNOTEFT_GEN_A_CORE
POST OPERATIVE PROCEDURAL DOCUMENTATION  PRE-OP DIAGNOSIS: Atrial fibrillation    POST-OP DIAGNOSIS: ATILIO occluded, no thrombus or leaks; successful cardioversion to NSR    PROCEDURE: Transesophageal echocardiogram    Primary Physician:   Assistant:    ANESTHESIA TYPE  [  ] General Anesthesia  [ x ] Conscious Sedation  [  ] Local/Regional    CONDITION  [  ] Critical  [  ] Serious  [  ] Fair  [X] Good    SPECIMENS REMOVED (IF APPLICABLE): N/A    IMPLANTS (IF APPLICABLE): None    ESTIMATED BLOOD LOSS: None    COMPLICATIONS: None      FINDINGS:    After risks and benefits of procedures were explained, informed consent was obtained and placed in chart. Refer to Anesthesia note for sedation details.  The HANNAH probe was passed into the esophagus without difficulty.  Transesophageal images were obtained.  The HANNAH probe was removed without difficulty and examined.  There was no evidence for bleeding.  The patient tolerated the procedure well without any immediate HANNAH-related complications.      Preliminary Findings:  LA: Normal  ATILIO: Left atrial appendage was occluded; no thrombus or leaks seen  LV: LVEF was estimated to be normal  MV: Trace MR  AV: No evidence of AI, no evidence of AS.   RA: Normal  TV: Mild TR  PV: Mild PI  IAS: no PFO. No R-> L shunt by color doppler  There was mild, non-mobile atheroma seen in the thoracic aorta.     Patient successfully converted to sinus rhythm with synchronized  200J of direct current cardioversion.    DIAGNOSIS/IMPRESSION:  ATILIO occluded; no thrombus or leaks; successful cardioversion to NSR    PLAN OF CARE:  - Continue amiodarone  - Restart Eliquis for anticoagulation  - F/U with CT surgery POST OPERATIVE PROCEDURAL DOCUMENTATION  PRE-OP DIAGNOSIS: Atrial fibrillation    POST-OP DIAGNOSIS: ATILIO occluded, no thrombus or leaks; successful cardioversion to NSR    PROCEDURE: Transesophageal echocardiogram    Primary Physician: Dr. Morris  Fellow: Dr. Deleon    ANESTHESIA TYPE  [  ] General Anesthesia  [ x ] Conscious Sedation  [  ] Local/Regional    CONDITION  [  ] Critical  [  ] Serious  [  ] Fair  [X] Good    SPECIMENS REMOVED (IF APPLICABLE): N/A    IMPLANTS (IF APPLICABLE): None    ESTIMATED BLOOD LOSS: None    COMPLICATIONS: None      FINDINGS:    After risks and benefits of procedures were explained, informed consent was obtained and placed in chart. Refer to Anesthesia note for sedation details.  The HANNAH probe was passed into the esophagus without difficulty.  Transesophageal images were obtained.  The HANNAH probe was removed without difficulty and examined.  There was no evidence for bleeding.  The patient tolerated the procedure well without any immediate HANNAH-related complications.      Preliminary Findings:  LA: Normal  ATILIO: Left atrial appendage was occluded; no thrombus or leaks seen  LV: LVEF was estimated to be normal  MV: Trace MR  AV: No evidence of AI, no evidence of AS.   RA: Normal  TV: Mild TR  PV: Mild PI  IAS: no PFO. No R-> L shunt by color doppler  There was mild, non-mobile atheroma seen in the thoracic aorta.     Patient successfully converted to sinus rhythm with synchronized  200J of direct current cardioversion.    DIAGNOSIS/IMPRESSION:  ATILIO occluded; no thrombus or leaks; successful cardioversion to NSR    PLAN OF CARE:  - Continue amiodarone  - Restart Eliquis for anticoagulation  - F/U with CT surgery POST OPERATIVE PROCEDURAL DOCUMENTATION  PRE-OP DIAGNOSIS: Atrial fibrillation    POST-OP DIAGNOSIS: ATILIO occluded, no thrombus or leaks; successful cardioversion to NSR    PROCEDURE: Transesophageal echocardiogram, Direct Current Cardioversion    Primary Physician: Dr. Morris  Fellow: Dr. Deleon    ANESTHESIA TYPE  [  ] General Anesthesia  [ x ] Conscious Sedation  [  ] Local/Regional    CONDITION  [  ] Critical  [  ] Serious  [  ] Fair  [X] Good    SPECIMENS REMOVED (IF APPLICABLE): N/A    IMPLANTS (IF APPLICABLE): None    ESTIMATED BLOOD LOSS: None    COMPLICATIONS: None      FINDINGS:    After risks and benefits of procedures were explained, informed consent was obtained and placed in chart. Refer to Anesthesia note for sedation details.  The HANNAH probe was passed into the esophagus without difficulty.  Transesophageal images were obtained.  The HANNAH probe was removed without difficulty and examined.  There was no evidence for bleeding.  The patient tolerated the procedure well without any immediate HANNAH-related complications.      Preliminary Findings:  LA: Normal  ATILIO: Left atrial appendage was occluded; no thrombus or leaks seen, small crater seen-however, no leak.  LV: LVEF was estimated to be normal  MV: Trace MR  AV: No evidence of AI, no evidence of AS.   RA: Normal  TV: Mild TR  PV: Mild PI  IAS: no PFO. No R-> L shunt by color doppler  There was mild, non-mobile atheroma seen in the thoracic aorta.     Patient successfully converted to sinus rhythm with synchronized  200J of direct current cardioversion.    DIAGNOSIS/IMPRESSION:  ATILIO occluded; no thrombus or leaks; successful cardioversion to NSR    PLAN OF CARE:  - Continue amiodarone  - Restart Eliquis for anticoagulation  - OP f-up in 2-3 weeks  - F/U with CT surgery  - Can be DC from EP standpoint of view.

## 2021-09-14 NOTE — ED CDU PROVIDER INITIAL DAY NOTE - NS ED ROS FT
Review of Systems:  •	CONSTITUTIONAL - No fever, No diaphoresis, No weight change  •	SKIN - No rash  •	HEMATOLOGIC - No abnormal bleeding or bruising  •	EYES - No eye pain, No blurred vision  •	ENT - No change in hearing, No sore throat, No neck pain, No rhinorrhea, No ear pain  •	RESPIRATORY - +shortness of breath, No cough  •	CARDIAC -No chest pain, No palpitations  •	GI - No abdominal pain, No nausea, No vomiting, No diarrhea, No constipation, No bright red blood per rectum or melena. No flank pain  •                 - No dysuria, frequency, hematuria.   •	ENDO - No polydypsia, No polyuria, No heat/cold intolerance  •	MUSCULOSKELETAL - No joint paint, No swelling, No back pain  •	NEUROLOGIC - No numbness, No focal weakness, No headache, No dizziness  All other systems negative, unless specified in HPI

## 2021-09-14 NOTE — ED CDU PROVIDER INITIAL DAY NOTE - OBJECTIVE STATEMENT
61M PMH CAD s/p CABG on 9/12 by Dr Boone, Afib on amiodarone 200mg daily and metoprolol 25 mg q8 hrs, not currently on eliquis due to hemothorax s/p 1.5L drained 2 days ago by CT surg, p/w SOB and rapid afib. wife states pt was dc yesterday at 6pm and developed SOB/VARGAS at 8pm at home. was symptomatic all night. felt ligtheaded as well but no loc. no fever. +dry cough ever since surgery is improving. No le edema, le pain, hormones, hemoptysis. Pt wife checked his BP this morning was 76/46 and HR was 110 which prompted ED eval. In ED, pt seen in crit found to have rapid Afib, rate controlled s/p IV metoprolol. pt placed in obs for HR control and work up of SOB. Seen by Dr Boone and myself at bedside.  pt has appt w Dr Boone tomorrow at 130pm. 61M PMH CAD s/p CABG on 9/12 by Dr Boone, Afib on amiodarone 200mg daily and metoprolol 25 mg q8 hrs, not currently on eliquis due to hemothorax s/p 1.5L drained 2 days ago by CT surg, p/w SOB and rapid afib. wife states pt was dc yesterday at 6pm and developed SOB/VARGAS at 8pm at home. was symptomatic all night. felt ligtheaded as well but no loc. no fever. no cp. +dry cough ever since surgery is improving. No le edema, le pain, hormones, hemoptysis. Pt wife checked his BP this morning was 76/46 and HR was 110 which prompted ED eval. In ED, pt seen in crit found to have rapid Afib, rate controlled s/p IV metoprolol. pt placed in obs for HR control and work up of SOB. Seen by Dr Boone and myself at bedside.  pt has appt w Dr Boone tomorrow at 130pm. 61M PMH CAD s/p CABG on 9/12 by Dr Boone, Afib on amiodarone 200mg daily and metoprolol 25 mg q8 hrs, not currently on eliquis due to hemothorax s/p 1.5L drained 2 days ago by CT surg, p/w SOB and rapid afib. wife states pt was dc yesterday at 6pm and developed SOB/VARGAS at 8pm at home. was symptomatic all night. felt lightheaded as well but no loc. no fever. no cp. +dry cough ever since surgery is improving. No le edema, le pain, hormones, hemoptysis. Pt wife checked his BP this morning was 76/46 and HR was 110 which prompted ED eval. In ED, pt seen in crit found to have rapid Afib, rate controlled s/p IV metoprolol. pt placed in obs for HR control and work up of SOB. Seen by Dr Boone and myself at bedside.  pt has appt w Dr Boone tomorrow at 130pm.

## 2021-09-14 NOTE — ED PROVIDER NOTE - CLINICAL SUMMARY MEDICAL DECISION MAKING FREE TEXT BOX
82 y/o M PMH CAD s/p CABG x 4 on 8/12/21, PCI x 3 (2001x1; 2010 x2), A fib on amiodarone and metoprolol, Eliquis DCed yesterday, HTN, HLD, thoracentesis 9/12/21 for hemothorax presents for SOB x 1 day.  Was feeling better after thoracentesis a couple days ago, last night started having increased SOB associated with light-headedness and mild retrosternal chest pain.  VS reviewed. CT surgery consulted for care. CXR performed and shows small right sided effusion that was improved from prior. Labs reviewed. Will follow up CT surgery recommendations. Patient being sent to OBS status for further work up and management.

## 2021-09-14 NOTE — CONSULT NOTE ADULT - ASSESSMENT
Assessment/Plan:  81y Male status-post CABG day 33 with recent right thoracentesis serosanguinous and afib with RVR  - Discussed management option including attempts for rate control vs rhythm control with HANNAH/DCCV. After discussing pros-cons, he (and surgery team) opted for HANNAH/DCCV.  - Hx of Atri-clip. However, we will do HANNAH to assess occlusion.   - Restart Eliquis  - Continue Amiodarone  - management of pleural effusion as per CTS  - Case and plan discussed with CT Surgeon - Dr. Smith

## 2021-09-14 NOTE — CONSULT NOTE ADULT - SUBJECTIVE AND OBJECTIVE BOX
82 yo male with a pmh of CAD s/p  cabg x 4 on 8/12/21, PCI x 3 (2001x1; 2010 x2), afib on amiodarone and metoprolol (was on Multaq prior to CABG), Eliquis DCed yesterday, HTN, HLD, thoracentesis 9/12/21 for hemothorax presents for SOB x 1 day.  Was feeling better after thoracentesis. last night started having increased SOB associated with light-headedness and mild retrosternal chest pain.  No cough, recent illness, N/V, diaphoresis, increased leg pain/swelling, hemoptysis, hx of blood clots.  Had negative duplex study on 9/12. Sees Dr. Davis for cardiology, sees EP in [place] but wants to change EP. Currently feels fine.    Home medications:     aspirin 81 mg oral delayed release tablet 1 tab(s) orally once a day     Currently on Hold - Eliquis 5 mg oral tablet 1 tab(s) orally 2 times a day     metoprolol tartrate 25 mg oral tablet 1 tab(s) orally every 8 hours     oxycodone-acetaminophen 5 mg-325 mg oral tablet 1 tab(s) orally every 6 hours, As Needed -for moderate pain MDD:6      pantoprazole 40 mg oral delayed release tablet 1 tab(s) orally once a day (before a meal)     tamsulosin 0.4 mg oral capsule 1 cap(s) orally once a day (at bedtime)      Vital Signs Last 24 Hrs  T(F): 98.5 (14 Sep 2021 12:34), Max: 98.5 (14 Sep 2021 12:34)  HR: 104 (14 Sep 2021 12:34) (72 - 148)  BP: 110/67 (14 Sep 2021 12:34) (101/64 - 132/80)  BP(mean): 78 (14 Sep 2021 09:13) (78 - 78)  RR: 18 (14 Sep 2021 10:08) (18 - 18)  SpO2: 97% (14 Sep 2021 10:08) (97% - 100%)    Physical Exam:  General: NAD; A&Ox3  Cardiac: S1/S2, iregRandR, no murmur, no rubs  Lungs: unlabored respirations, CTA b/l, no wheeze, no rales, no crackles  Abdomen: Soft/NT/protuberant  Sternum: Intact, no click, incision healing well with no drainage  Incisions: Incisions clean/dry/intact  Extremities: No edema b/l lower extremities    LABS:                        13.1<L>  8.64  )-----------( 308      ( 14 Sep 2021 08:46 )             39.8<L>                        13.0<L>  8.43  )-----------( 269      ( 13 Sep 2021 05:47 )             40.5<L>    09-14    132<L>  |  94<L>  |  20  ----------------------------<  182<H>  4.0   |  25  |  1.2  09-13    134<L>  |  97<L>  |  13  ----------------------------<  129<H>  4.5   |  29  |  0.9    Ca    9.3      14 Sep 2021 08:46  Mg     2.0     09-14    TPro  6.6 [6.0 - 8.0]  /  Alb  3.8 [3.5 - 5.2]  /  TBili  0.8 [0.2 - 1.2]  /  DBili  x   /  AST  22 [0 - 41]  /  ALT  17 [0 - 41]  /  AlkPhos  96 [30 - 115]  09-14    < from: CT Angio Chest PE Protocol w/ IV Cont (09.14.21 @ 13:37) >  IMPRESSION:  1.  No central or segmental pulmonary embolus.  2.  Cardiomegaly with small-to-moderate bilateral pleural effusions and mild bibasilar interlobular septal thickening, suggestive of CHF/interstitial edema.  3.  Postsurgical changes as above.    < end of copied text >  < from: Xray Chest 1 View- PORTABLE-Urgent (09.14.21 @ 09:59) >  Increased bibasal opacities effusions. No pneumothorax.    Stable cardiomediastinal silhouette. Unchanged osseous structures.    < end of copied text >  < from: 12 Lead ECG (09.14.21 @ 08:16) >  Ventricular Rate 129 BPM    QRS Duration 98 ms    Q-T Interval 354 ms    QTC Calculation(Bazett) 518 ms    R Axis -26 degrees    T Axis 149 degrees    Diagnosis Line Atrial fibrillation with rapid ventricular response  Minimal voltage criteria for LVH, may be normal variant ( Tyron product )  ST & T wave abnormality, consider lateral ischemia  Abnormal ECG    MEDICATIONS  (STANDING):  metoprolol tartrate 25 milliGRAM(s) Oral every 8 hours    MEDICATIONS  (PRN):    Allergies    No Known Allergies    Intolerances      Ambulation/Activity Status: ambualte as tolerated

## 2021-09-14 NOTE — ED PROVIDER NOTE - PROGRESS NOTE DETAILS
co- pt endorsed to Dr. William- pending labs/imaging/ct surg eval, reassess, dispo [place]: Ct surgery consulted and aware of pt, will follow pt on admission

## 2021-09-14 NOTE — ED CDU PROVIDER INITIAL DAY NOTE - ATTENDING CONTRIBUTION TO CARE
wife translating at bedside at pt request    61M PMH CAD s/p CABG on 9/12 by Dr Boone, Afib on amiodarone 200mg daily and metoprolol 25 mg q8 hrs, not currently on eliquis due to hemothorax s/p 1.5L drained 2 days ago by CT surg, p/w SOB and rapid afib. wife states pt was dc yesterday at 6pm and developed SOB/VARGAS at 8pm at home. was symptomatic all night. felt ligtheaded as well but no loc. no fever. +dry cough ever since surgery is improving. No le edema, le pain, hormones, hemoptysis. Pt wife checked his BP this morning was 76/46 and HR was 110 which prompted ED eval. In ED, pt seen in crit found to have rapid Afib, rate controlled s/p IV metoprolol. pt placed in obs for HR control and work up of SOB. Seen by Dr Boone and myself at bedside.  pt has appt w Dr Boone tomorrow at 130pm.    on exam, AFVSS, well carrie nad, ncat, eomi, perrla, mmm, lctab, irreg irreg hr low 100s, well healed midline scar, nl s1s2 no mrg, abd soft ntnd, aaox3, no focal deficits, no le edema or calf ttp,    a/p; Afib w RVR , recent adjustment in home meds - Will restart home meds. Dr Boone states he spoke with EP and they will consult on pt for management of Afib.   SOB- Dr Boone recommends CTA r/o PE and echo r/o CHF. Also recommends IVF rehydration and repeat labs, Cr bumped from 0.9 to 1.2 and feels pt may be slightly dehydrated. Will cont to monitor on tele while in observation status. wife translating at bedside at pt request    61M PMH CAD s/p CABG on 9/12 by Dr Boone, Afib on amiodarone 200mg daily and metoprolol 25 mg q8 hrs, not currently on eliquis due to hemothorax s/p 1.5L drained 2 days ago by CT surg, p/w SOB and rapid afib. wife states pt was dc yesterday at 6pm and developed SOB/VARGAS at 8pm at home. was symptomatic all night. felt ligtheaded as well but no loc. no fever. no cp. +dry cough ever since surgery is improving. No le edema, le pain, hormones, hemoptysis. Pt wife checked his BP this morning was 76/46 and HR was 110 which prompted ED eval. In ED, pt seen in crit found to have rapid Afib, rate controlled s/p IV metoprolol. pt placed in obs for HR control and work up of SOB. Seen by Dr Boone and myself at bedside.  pt has appt w Dr Boone tomorrow at 130pm.    on exam, AFVSS, well carrie nad, ncat, eomi, perrla, mmm, lctab, irreg irreg hr low 100s, well healed midline scar, nl s1s2 no mrg, abd soft ntnd, aaox3, no focal deficits, no le edema or calf ttp,    a/p; Afib w RVR , recent adjustment in home meds - Will restart home meds. Dr Boone states he spoke with EP and they will consult on pt for management of Afib.   SOB- Dr Boone recommends CTA r/o PE and echo r/o CHF. Also recommends IVF rehydration and repeat labs, Cr bumped from 0.9 to 1.2 and feels pt may be slightly dehydrated. Will cont to monitor on tele while in observation status.

## 2021-09-14 NOTE — ED PROVIDER NOTE - PHYSICAL EXAMINATION
GENERAL: Well-developed; well-nourished; in no acute distress.   SKIN: warm, dry  HEAD: Normocephalic; atraumatic.  EYES: PERRLA, EOMI  CARD: S1, S2 normal; no murmurs, gallops, or rubs. Tachycardic irregular rate   RESP: LCTAB; No wheezes, rales, rhonchi, or stridor.  ABD: soft, nontender, and nondistended  EXT: 1+ pitting edema, baseline per pt   NEURO: Alert, oriented, grossly unremarkable  PSYCH: Cooperative, appropriate.

## 2021-09-15 ENCOUNTER — APPOINTMENT (OUTPATIENT)
Dept: CARDIOTHORACIC SURGERY | Facility: CLINIC | Age: 81
End: 2021-09-15

## 2021-09-15 VITALS
SYSTOLIC BLOOD PRESSURE: 194 MMHG | TEMPERATURE: 99 F | HEART RATE: 79 BPM | OXYGEN SATURATION: 98 % | DIASTOLIC BLOOD PRESSURE: 91 MMHG | RESPIRATION RATE: 18 BRPM

## 2021-09-15 PROCEDURE — 99217: CPT

## 2021-09-15 PROCEDURE — 71046 X-RAY EXAM CHEST 2 VIEWS: CPT | Mod: 26

## 2021-09-15 RX ORDER — METOPROLOL TARTRATE 50 MG
25 TABLET ORAL ONCE
Refills: 0 | Status: COMPLETED | OUTPATIENT
Start: 2021-09-15 | End: 2021-09-15

## 2021-09-15 RX ORDER — APIXABAN 2.5 MG/1
5 TABLET, FILM COATED ORAL ONCE
Refills: 0 | Status: COMPLETED | OUTPATIENT
Start: 2021-09-15 | End: 2021-09-15

## 2021-09-15 RX ORDER — AMIODARONE HYDROCHLORIDE 400 MG/1
200 TABLET ORAL ONCE
Refills: 0 | Status: COMPLETED | OUTPATIENT
Start: 2021-09-15 | End: 2021-09-15

## 2021-09-15 RX ADMIN — APIXABAN 5 MILLIGRAM(S): 2.5 TABLET, FILM COATED ORAL at 08:12

## 2021-09-15 RX ADMIN — Medication 25 MILLIGRAM(S): at 08:12

## 2021-09-15 RX ADMIN — AMIODARONE HYDROCHLORIDE 200 MILLIGRAM(S): 400 TABLET ORAL at 08:12

## 2021-09-15 NOTE — ED ADULT NURSE REASSESSMENT NOTE - NS ED NURSE REASSESS COMMENT FT1
pt ambulated unit with steady gait, pt ambulated entire back unit, o2 sat 95-97% while ambulating, pt denies any shortness of breath. pt educated and encouraged to ambulate.
pt assessed, pt A&Ox4, wife at bedside, pt cardioversion successful, pt normal sinus rhythm HR 70's, pt denies any SOB at this time, pt encouraged to ambulate, pt currently D/Cd waiting for , D/C instructions reviewed with patient and wife by MD Barlow, pt and wife verbalized understanding of D/C instructions, pt did not voice any concerns at this time, 2-IVLs removed, pt educated about medication, safety and comfort maintained.
pt assessed, pt A&Ox4, pt denies any pain at this time, cardiac monitor maintained, pt ambulated 600 ft this morning and shows no signs of SOB, O2 Sat 98% RA, p tolerating meals and meds, safety and comfort maintained, will continue to monitor.

## 2021-09-15 NOTE — ED CDU PROVIDER DISPOSITION NOTE - PATIENT PORTAL LINK FT
You can access the FollowMyHealth Patient Portal offered by [facility] by registering at the following website: [email or web address]. By joining [employer]’s FollowMyHealth portal, you will also be able to view your health information using other applications (apps) compatible with our system.
You can access the FollowMyHealth Patient Portal offered by [facility] by registering at the following website: [email or web address]. By joining [employer]’s FollowMyHealth portal, you will also be able to view your health information using other applications (apps) compatible with our system.

## 2021-09-15 NOTE — ED CDU PROVIDER DISPOSITION NOTE - CLINICAL COURSE
Patient feeling better.  Presented in Afib, was cardioverted and remains in NSR.  CXR confirms no reaccumulation of fluid in right lung.  Patient seen and cleared by CT surgery.  F/U with Dr. Smith as scheduled.  All results discussed with patient.  Strict return instructions discussed.

## 2021-09-15 NOTE — ED CDU PROVIDER SUBSEQUENT DAY NOTE - PROGRESS NOTE DETAILS
Pt resting comfortably, denies any complaints. Will continue to monitor. Received sign out from ARELY Crowley. Patient with complaints of mild dyspnea this AM. Repeat CXR obtained and CT-Surgery made aware of patients symptoms and need for re-evaluation prior to disposition. They will come to see patient.

## 2021-09-15 NOTE — ED CDU PROVIDER DISPOSITION NOTE - CARE PROVIDERS DIRECT ADDRESSES
,DirectAddress_Unknown,[email or web address]
,DirectAddress_Unknown,[email or web address]

## 2021-09-15 NOTE — ED CDU PROVIDER DISPOSITION NOTE - NSDCPRINTRESULTS_ED_ALL_ED
Patient requests all Lab, Cardiology, and Radiology Results on their Discharge Instructions
Patient requests all Lab, Cardiology, and Radiology Results on their Discharge Instructions

## 2021-09-15 NOTE — ED CDU PROVIDER DISPOSITION NOTE - NSFOLLOWUPINSTRUCTIONS_ED_ALL_ED_FT
YOU UNDERWENT CARDIOVERSION FOR YOUR ATRIAL FIBRILLATION.  YOU WERE EVALUATED BY YOUR CT-SURGEON AND CARDIOLOGIST-EP.  FOLLOW-UP AS AN OUTPATIENT AS DIRECTED.    Atrial fibrillation is the most common heart rhythm problem. The condition puts you at risk of stroke, heart attack, and other problems. Another term for atrial fibrillation is "A-fib."    In people with A-fib, the electrical signals that control the heartbeat are abnormal. As a result, the top 2 chambers of the heart stop pumping effectively, and a small amount of the blood that should move out of these chambers gets left behind. As the blood pools, it can start to form clots. These clots can travel to the brain through the blood vessels, and cause strokes.  In some people, A-fib never goes away. In others, A-fib can come and go, even with treatment. Many people with A-fib are able to live normal lives. Still, it is important that you take the medicines your doctor prescribes every day. Taking your medicines as directed can help reduce the chances that your A-fib will cause a stroke.    It's also a good idea to learn what the signs and symptoms of a stroke are: The Acronym "FAST" is helpful. F: Face uneven (someone's face does not appear symmetrical), A: Arm weakness (when one arm or leg and not the other is weak), S: Speech is strange, T: TIME TO CALL AN AMBULANCE, if you experience any of those three symptoms.
A-fib (Atrial Fibrillation)    WHAT YOU NEED TO KNOW:    A-fib may come and go, or it may be a long-term condition. A-fib can cause blood clots, stroke, or heart failure. These conditions may become life-threatening. It is important to treat and manage A-fib to help prevent a blood clot, stroke, or heart failure.    Heart Chambers         DISCHARGE INSTRUCTIONS:    Call your local emergency number (911 in the [place]) or have someone call if:   •You have any of the following signs of a heart attack: ?Squeezing, pressure, or pain in your chest      ?You may also have any of the following: ?Discomfort or pain in your back, neck, jaw, stomach, or arm      ?Shortness of breath      ?Nausea or vomiting      ?Lightheadedness or a sudden cold sweat        •You have any of the following signs of a stroke: ?Numbness or drooping on one side of your face       ?Weakness in an arm or leg      ?Confusion or difficulty speaking      ?Dizziness, a severe headache, or vision loss        Call your doctor or cardiologist if:   •Your arm or leg feels warm, tender, and painful. It may look swollen and red.      •Your heart rate is more than 110 beats per minute.      •You have new or worsening swelling in your legs, feet, ankles, or abdomen.       •You are short of breath, even at rest.      •You have questions or concerns about your condition or care.      Medicines: You may need any of the following:  •Heart medicines help control your heart rate or rhythm. You may need more than one medicine to treat your symptoms.      •Blood thinners help prevent blood clots. Clots can cause strokes, heart attacks, and death. The following are general safety guidelines to follow while you are taking a blood thinner:?Watch for bleeding and bruising while you take blood thinners. Watch for bleeding from your gums or nose. Watch for blood in your urine and bowel movements. Use a soft washcloth on your skin, and a soft toothbrush to brush your teeth. This can keep your skin and gums from bleeding. If you shave, use an electric shaver. Do not play contact sports.       ?Tell your dentist and other healthcare providers that you take a blood thinner. Wear a bracelet or necklace that says you take this medicine.       ?Do not start or stop any other medicines unless your healthcare provider tells you to. Many medicines cannot be used with blood thinners.      ?Take your blood thinner exactly as prescribed by your healthcare provider. Do not skip does or take less than prescribed. Tell your provider right away if you forget to take your blood thinner, or if you take too much.      ?Warfarin is a blood thinner that you may need to take. The following are things you should be aware of if you take warfarin: ?Foods and medicines can affect the amount of warfarin in your blood. Do not make major changes to your diet while you take warfarin. Warfarin works best when you eat about the same amount of vitamin K every day. Vitamin K is found in green leafy vegetables and certain other foods. Ask for more information about what to eat when you are taking warfarin.      ?You will need to see your healthcare provider for follow-up visits when you are on warfarin. You will need regular blood tests. These tests are used to decide how much medicine you need.         •Antiplatelets, such as aspirin, help prevent blood clots. Take your antiplatelet medicine exactly as directed. These medicines make it more likely for you to bleed or bruise. If you are told to take aspirin, do not take acetaminophen or ibuprofen instead.       •Take your medicine as directed. Contact your healthcare provider if you think your medicine is not helping or if you have side effects. Tell him or her if you are allergic to any medicine. Keep a list of the medicines, vitamins, and herbs you take. Include the amounts, and when and why you take them. Bring the list or the pill bottles to follow-up visits. Carry your medicine list with you in case of an emergency.      Manage A-fib:   •Know your target heart rate. Learn how to check your pulse and monitor your heart rate.  How to Take a Pulse           •Know the risks if you choose to drink alcohol. Alcohol can increase your risk for A-fib or make A-fib harder to manage. Ask your healthcare provider if it is okay for you to drink any alcohol. He or she can help you set limits for the number of drinks you have in 24 hours and in a week. A drink of alcohol is 12 ounces of beer, 5 ounces of wine, or 1½ ounces of liquor.      •Do not smoke. Nicotine can cause heart damage and make it more difficult to manage your A-fib. Do not use e-cigarettes or smokeless tobacco in place of cigarettes or to help you quit. They still contain nicotine. Ask your healthcare provider for information if you currently smoke and need help quitting.      •Eat heart-healthy foods. Heart healthy foods will help keep your cholesterol low. These include fruits, vegetables, whole-grain breads, low-fat dairy products, beans, lean meats, and fish. Replace butter and margarine with heart-healthy oils such as olive oil and canola oil.             •Maintain a healthy weight. Ask your healthcare provider what a healthy weight is for you. Ask him or her to help you create a safe weight loss plan if you are overweight. Even a small goal of a 10% weight loss can improve your heart health.      •Get regular physical activity. Physical activity helps improve your heart health. Get at least 150 minutes of moderate aerobic physical activity each week. Your healthcare provider can help you create an activity plan.  [employer] Walking for Exercise           •Manage other health conditions. This includes high blood pressure or cholesterol, sleep apnea, diabetes, and other heart conditions. Take medicine as directed and follow your treatment plan. Your healthcare provider may need to change a medicine you are taking if it is causing your A-fib. Do not stop taking any medicine unless directed by your provider.      Follow up with your doctor or cardiologist as directed: You will need regular blood tests and monitoring. Write down your questions so you remember to ask them during your visits.    Shortness of breath    Shortness of breath (dyspnea) means you have trouble breathing and could indicate a medical problem. Causes include lung disease, heart disease, low amount of red blood cells (anemia), poor physical fitness, being overweight, smoking, etc. Your health care provider today may not be able to find a cause for your shortness of breath after your exam. In this case, it is important to have a follow-up exam with your primary care physician as instructed. If medicines were prescribed, take them as directed for the full length of time directed. Refrain from tobacco products.    SEEK IMMEDIATE MEDICAL CARE IF YOU HAVE ANY OF THE FOLLOWING SYMPTOMS: worsening shortness of breath, chest pain, back pain, abdominal pain, fever, coughing up blood, lightheadedness/dizziness.    Please follow up with cardiothoracic surgeon and cardiologist in 1 week.

## 2021-09-15 NOTE — ED CDU PROVIDER DISPOSITION NOTE - CARE PROVIDER_API CALL
Carmencita Morris)  Cardiac Electrophysiology; Cardiology; Internal Medicine  [address] [place] [address]  Phone: [phone]  Fax: [phone]  Follow Up Time: 1-3 Days    Keon Smith)  Surgery; Thoracic and Cardiac Surgery  [address] [place] [address], [address]  [place] [address]  Phone: [phone]  Fax: [phone]  Follow Up Time: 1-3 Days  
Carmencita Morris)  Cardiac Electrophysiology; Cardiology; Internal Medicine  [address] [place] [address]  Phone: [phone]  Fax: [phone]  Follow Up Time: 4-6 Days    Keon Smith)  Surgery; Thoracic and Cardiac Surgery  [address] [place] [address], [address]  [place] [address]  Phone: [phone]  Fax: [phone]  Follow Up Time: 7-10 Days

## 2021-09-19 ENCOUNTER — TRANSCRIPTION ENCOUNTER (OUTPATIENT)
Age: 81
End: 2021-09-19

## 2021-09-20 ENCOUNTER — APPOINTMENT (OUTPATIENT)
Dept: CARDIOLOGY | Facility: CLINIC | Age: 81
End: 2021-09-20

## 2021-09-20 ENCOUNTER — OUTPATIENT (OUTPATIENT)
Dept: OUTPATIENT SERVICES | Facility: HOSPITAL | Age: 81
LOS: 1 days | Discharge: HOME | End: 2021-09-20
Payer: SUBSIDIZED

## 2021-09-20 DIAGNOSIS — J90 PLEURAL EFFUSION, NOT ELSEWHERE CLASSIFIED: ICD-10-CM

## 2021-09-20 PROCEDURE — 71046 X-RAY EXAM CHEST 2 VIEWS: CPT | Mod: 26

## 2021-09-22 ENCOUNTER — APPOINTMENT (OUTPATIENT)
Dept: CARDIOTHORACIC SURGERY | Facility: CLINIC | Age: 81
End: 2021-09-22

## 2021-09-22 VITALS
RESPIRATION RATE: 12 BRPM | BODY MASS INDEX: 26.8 KG/M2 | DIASTOLIC BLOOD PRESSURE: 75 MMHG | WEIGHT: 157 LBS | TEMPERATURE: 98.7 F | OXYGEN SATURATION: 96 % | HEART RATE: 61 BPM | SYSTOLIC BLOOD PRESSURE: 158 MMHG | HEIGHT: 64 IN

## 2021-09-23 ENCOUNTER — TRANSCRIPTION ENCOUNTER (OUTPATIENT)
Age: 81
End: 2021-09-23

## 2021-09-27 ENCOUNTER — OUTPATIENT (OUTPATIENT)
Dept: OUTPATIENT SERVICES | Facility: HOSPITAL | Age: 81
LOS: 1 days | Discharge: HOME | End: 2021-09-27
Payer: SUBSIDIZED

## 2021-09-27 DIAGNOSIS — Z95.1 PRESENCE OF AORTOCORONARY BYPASS GRAFT: ICD-10-CM

## 2021-09-27 PROCEDURE — 71046 X-RAY EXAM CHEST 2 VIEWS: CPT | Mod: 26

## 2021-09-29 ENCOUNTER — APPOINTMENT (OUTPATIENT)
Dept: CARDIOTHORACIC SURGERY | Facility: CLINIC | Age: 81
End: 2021-09-29
Payer: MEDICAID

## 2021-09-29 ENCOUNTER — APPOINTMENT (OUTPATIENT)
Dept: CARDIOLOGY | Facility: CLINIC | Age: 81
End: 2021-09-29

## 2021-09-29 VITALS
TEMPERATURE: 98 F | HEIGHT: 64 IN | HEART RATE: 68 BPM | DIASTOLIC BLOOD PRESSURE: 80 MMHG | OXYGEN SATURATION: 97 % | RESPIRATION RATE: 14 BRPM | SYSTOLIC BLOOD PRESSURE: 153 MMHG

## 2021-09-29 PROCEDURE — 99024 POSTOP FOLLOW-UP VISIT: CPT

## 2021-09-30 ENCOUNTER — TRANSCRIPTION ENCOUNTER (OUTPATIENT)
Age: 81
End: 2021-09-30

## 2021-10-03 ENCOUNTER — TRANSCRIPTION ENCOUNTER (OUTPATIENT)
Age: 81
End: 2021-10-03

## 2021-10-04 ENCOUNTER — TRANSCRIPTION ENCOUNTER (OUTPATIENT)
Age: 81
End: 2021-10-04

## 2021-10-05 NOTE — ED ADULT NURSE NOTE - CINV DISCH MEDS REVIEWED YN
What Type Of Note Output Would You Prefer (Optional)?: Standard Output Hpi Title: Evaluation of Skin Lesions How Severe Are Your Spot(S)?: mild Have Your Spot(S) Been Treated In The Past?: has not been treated Family Member: Brother Additional History: Irritated spots back, arms, hands \\nNot good about using spf, wears hats\\nBrother just had melanoma removed Yes

## 2021-10-13 ENCOUNTER — TRANSCRIPTION ENCOUNTER (OUTPATIENT)
Age: 81
End: 2021-10-13

## 2021-10-24 NOTE — PATIENT PROFILE ADULT - FALL HARM RISK CONCLUSION
Nephrology Progress Note    No acute issues reported overnight  Appetite poor    ROS:  Denies nausea, vomiting, chest pain, shortness of breath      MEDICATIONS:  • morphine injection  1 mg Intravenous Once   • LORazepam  1 mg Oral TID   • loratadine  10 mg Oral Daily   • lisinopril  5 mg Oral Daily   • [Held by provider] spironolactone  25 mg Oral Daily   • enoxaparin  40 mg Subcutaneous QHS   • metoPROLOL succinate  50 mg Oral Daily   • thiamine  100 mg Oral Daily   • folic acid  1 mg Oral Daily          Physical Examination:  Vital Signs:    Visit Vitals  BP (!) 146/72 (BP Location: RUE - Right upper extremity)   Pulse (!) 106   Temp 97.9 °F (36.6 °C) (Oral)   Resp 20   Ht 5' 5\" (1.651 m)   Wt 65.5 kg (144 lb 6.4 oz)   SpO2 96%   BMI 24.03 kg/m²     General:  No acute distress.  Head:  Normocephalic-atraumatic.   Neck:  Trachea is midline.  Eyes:  Normal conjunctivae.  ENT:   Mucous membranes are moist.    Cardiovascular:  S1, S2 auscultated.  Regular rate and rhythm.  Bilateral peripheral pulses are intact.  Respiratory:   Bilateral breath sounds heard. Symmetric chest wall expansion.  Respirations are non-labored.    Gastrointestinal:  Soft and nontender.  Non-distended.  Positive bowel sounds.    Genitourinary: No CVA tenderness.    Musculoskeletal:  No joint swelling.  Skin: Warm and dry.  No rash noted.  Neurologic:   Not assessed    I/O's    Intake/Output Summary (Last 24 hours) at 10/24/2021 1049  Last data filed at 10/24/2021 0930  Gross per 24 hour   Intake 940 ml   Output 1350 ml   Net -410 ml       Labs:    Recent Labs   Lab 10/24/21  0412 10/23/21  0430 10/22/21  0407   SODIUM 131* 131* 131*   POTASSIUM 3.6 3.9 3.2*   CHLORIDE 103 103 102   CO2 22 21 22   BUN 8 9 9   CREATININE 0.81 0.76 0.80   GLUCOSE 97 96 105*   CALCIUM 8.0* 8.3* 8.7      Recent Labs   Lab 10/24/21  0412 10/23/21  0430 10/22/21  0407 10/18/21  0412 10/17/21  1733   SODIUM 131* 131* 131* 121* 117*   CHLORIDE 103 103 102 89* 85*    CO2 22 21 22 16* 17*   BUN 8 9 9 29* 33*   CREATININE 0.81 0.76 0.80 1.01* 1.24*   CALCIUM 8.0* 8.3* 8.7 8.9 9.8   ALBUMIN 2.0*  --   --  3.0* 3.9   BILIRUBIN 0.5  --   --  1.0 2.6*   ALKPT 49  --   --  45 60   GPT 66*  --   --  123* 176*   AST 45*  --   --  80* 112*   GLUCOSE 97 96 105* 97 105*      Recent Labs   Lab 10/24/21  0412   WBC 5.2   RBC 2.71*   HGB 9.7*   HCT 27.2*           Imaging    CT HEAD WO CONTRAST   Final Result   1.   Exam limited by motion with developing small right frontal parenchymal   contusion.  No midline shift, mass effect or herniation.  Consider   follow-up CT head within 24 hours for reassessment.   2.   Chronic paranasal sinus disease.   3.   No skull fracture.      Electronically Signed by: MARY LEMONS M.D.    Signed on: 10/19/2021 2:10 PM          CT HEAD WO CONTRAST   Final Result   1. Mild generalized cortical atrophy and white matter ischemic   demyelination.    2. No evidence of hemorrhage, mass or acute CVA.        Electronically Signed by: ART HUFF M.D.    Signed on: 10/17/2021 7:55 PM          CT CERVICAL SPINE WO CONTRAST   Final Result   No evidence of cervical spine fracture or subluxation.        Electronically Signed by: ART HUFF M.D.    Signed on: 10/17/2021 7:55 PM          XR CHEST PA OR AP 1 VIEW   Final Result   No evidence of acute cardiopulmonary process.      Electronically Signed by: SILVIANO PAGE M.D.    Signed on: 10/17/2021 5:57 PM               Assessment and Plan:    1.  Hyponatremia.    -Serum sodium stable  -Increase rate of D5/0.9 NS at 80 cc an hour due to poor appetite  -Encourage solute intake as tolerated  -Monitor chemistries    2.  Hypokalemia.  Due to nutritional deficiency and dextrose containing IV fluid.    -Improved  -Replace as needed    3.  Hypertension.    - Continue ACE inhibitor and beta-blocker.    4.  Acute delirium.    - Improving.  Further management per the primary team.           Universal Safety Interventions

## 2021-10-25 NOTE — ED CDU PROVIDER INITIAL DAY NOTE - INDICATION FOR OBSERVATION
Diagnostic Uncertainty/Therapeutic Intensity pt aware to fu with ENT and pmd and return to ED for any worsening of symptoms. pt agrees with plan and well appearing on dc. -Niurka Casanova PA-C

## 2022-02-16 NOTE — PATIENT PROFILE ADULT - NSPROPTRIGHTNOTIFY_GEN_A_NUR
Last Clinic Visit: 12-  Per lab note to pt:  Dear Dulce, Your thyroid levels were a bit too high, so I lowered your dose of medication slightly.  Please let me know if you have concerns.   Best,  Gilda Hogan MD ...    
declines

## 2022-05-17 ENCOUNTER — APPOINTMENT (OUTPATIENT)
Dept: CARDIOLOGY | Facility: CLINIC | Age: 82
End: 2022-05-17

## 2022-05-31 NOTE — ED CDU PROVIDER DISPOSITION NOTE - SECONDARY DIAGNOSIS.
[facility]  MA Rooming Progress Note    1. Are you taking medication as instructed? Yes  2. Do you have any medication questions/concerns since your last visit?  No  3. Did the patient bring a friend or family member with them into the room? No  4. If so, did the patient give explicit permission for the practitioner to discuss their healthcare in front of that friend/family member? N/A      No pill count done today, patient brought in her empty bottle filled on 4/29/22 (more than 30 days ago).        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

## 2022-08-30 NOTE — ED PROVIDER NOTE - WR ORDER NAME 1
1) Follow-up with your Primary Medical Doctor or referred doctor. Call today / next business day for prompt follow-up.  2) Return to Emergency room for any worsening or persistent pain, weakness, fever, or any other concerning symptoms.  3) See attached instruction sheets for additional information, including information regarding signs and symptoms to look out for, reasons to seek immediate care and other important instructions.  4) Follow-up with any specialists as discussed / noted as well. Xray Chest 2 Views PA/Lat

## 2022-09-08 NOTE — PHYSICAL THERAPY INITIAL EVALUATION ADULT - PHYSICAL ASSIST/NONPHYSICAL ASSIST: STAND/SIT, REHAB EVAL
----- Message from Jus Pabon MD sent at 9/7/2022  7:04 PM CDT -----  Macrodantin 50mg bid 7 days  Let surgeon know   Urine culture after  
Jus Pabon MD   9/8/2022  9:45 AM CDT Back to Top        Stop macrodantin  Cipro 500mg bid 7 days  Urine culture after       Spoke with patient and notified final urine culture came back and will be cancelling Macrodantin and changing to Cipro and Nystatin cream will be sent to [facility] and to apply three times daily. 30 g. Patient verbalize understanding.  
Nystatin cream aaa tid   
Spoke with Brandyn at Dr. Schmitz's office and advised of patient's positive urine culture and patient will be started on antibiotic. Dr. Pabon would like to see if Dr. Schmitz is okay with proceeding with surgery on Sept. 13. Brandyn will forward to nurse and have nurse return writer's call.    Spoke with patient and notified Dr. Pabon reviewed urine culture and it was positive. Patient advised to start Macrodantin 50 mg 1 tablet twice daily for 7 days and repeat urine culture after. Patient advised call has been placed to Dr. Schmitz's office to see if okay to proceed with surgery. Patient informed she will be updated on surgery once writer hears back from  Dr. Schmitz's office.    Patient states she occasionally will get  yeast under the folds of her stomach. Patient currently has a flare up. Complains of slight redness and itching. Patient denies any drainage, fever, chills. Please advise.  
Spoke with Magnolia at Dr. Schmitz's office and is okay for patient to proceed with surgery. Patient to continuing taking antibiotic and complete treatment.    Spoke with patient and notified Dr. Schmitz is good with proceeding with surgery and to complete antibiotic and urine culture after. Patient verbalize understanding.  
2 person assist

## 2023-01-17 NOTE — PHYSICAL THERAPY INITIAL EVALUATION ADULT - TRANSFER TRAINING, PT EVAL
HEARING AID CHECK    Audiology  Hearing Aid    : WideHStreaming Model/style/color: Moment 220 R /BTE / Lindsay White  Date of purchase/dispense date: 12/20/2022   REPAIR WARRANTY EXPIRATION DATE: 12/28/2024   Loss and Damage expiration date: 12/28/224  [employer] expiration date: 12/28/2024  Serial # right: [id number] // Serial # left: [id number]  Payor: Private pay    Battery size: Internal Lithium Ion    Earmold : Pepe  ---Earmold style and material: hard skeleton earmold  ---Earmold serial # right:[id number] left:[id number]  ---Earmold warranty expiration date: 02/28/2023  ---Tubing: Snap connect tube    Accessory: Widex   ---Accessory serial # [id number] //   ---Accessory warranty expiration date: 11/28/2025(repair only)   used: [email or web address]    Updated by Gm Steiner on 12/2/2022; updated 12/5/2022    SUBJECTIVE:  Reason for visit: Patient presents for hearing aid check. She indicated again that she intends to keep the hearing aid. She has noticed that certain background noises - like water running or her  shuffling a deck of cards - are too loud.    OBJECTIVE:  Services provided:   Programming: Decreased G80 overall and low frequency gain. Also moved Quiet and Party sound classifications towards Comfort.    ASSESSMENT:  Patient's replacement left earmold is not in our office yet. We will call her to schedule a hearing aid check when it arrives.    Patient will need to try out new settings at home to see if things sound better.    FOLLOW-UP:  Return as needed for hearing aid services.   
Improve transfers to [facility] by D/C

## 2023-09-27 NOTE — ED ADULT NURSE NOTE - HIV OFFER
Opt out
Total Cumulative Dose (Cgy): 1377.60
Bill For Ultrasound ()?: Yes
Calculate Total Cumulative Dose Automatically Or Manually: Manually
Additional Change To Daily Dosage Administered Mid Treatment?: No
Treatment Documentation: Plan: This patient has been treated today with image-guided superficial radiation therapy for non-melanoma skin cancer. Written informed consent has been previously obtained from this patient for this treatment. This consent is documented in the patient's chart. The patient gave verbal consent to continue treatment today. The patient was treated with a specific radiation dose and setup as prescribed by the provider listed on this visit note. A Radiation Therapist performed administration of radiation under the supervision of a provider. The treatment parameters and cumulative dose are indicated above. Prior to administering the radiation, the patient underwent a verification therapeutic radiology simulation-aided field setting defining relevant normal and abnormal target anatomy and acquiring images with separate and distinct diagnostic high-frequency ultrasound to delineate tissues and determine whether to proceed with delivery of therapeutic, in addition to retrieve data necessary to develop an optimal radiation treatment process for the patient. The field placement simulation documents any change seen in overall tumor volume documented in the patient’s record, allows the clinician to indicate any needed changes in the treatment plan and/or prescription, provides diagnostic evaluation as the basis for performing the therapeutic procedure, and clearly identifies the information needed to decide to proceed with the therapeutic procedure. This process includes verification of the treatment port(s) and proper treatment positioning. All treatment ports were photographed within electronic medical records. The patient's lead blocking along with gross tumor volume and margin was confirmed. Considering superficial radiotherapy is clinical in setup, this requires the physician and radiation therapist to clarify the location interest being treated against initial images, ultrasound, pathology, and patient anatomy. Care was taken to ensure recio treated were geometrically accurate and properly positioned using therapeutic radiology simulation-aided field setting verification per fraction. This process is also utilized to determine if any prescription or setup changes are necessary. These steps are therefore medically necessary to ensure safe and effective administration of radiation. Ongoing therapeutic radiology simulation-aided field setting verification is ordered throughout the course of therapy.\\n\\nA high-frequency ultrasound image was acquired today for a two-dimensional evaluation of the tumor volume, depth, width, breadth, review of prior response to treatment, provide geometric accuracy of field placement, and determine whether to proceed with therapeutic delivery. \\n\\nThe field placement and ultrasound imaging, per fraction, is separate and distinct from the initial simulation and is an important task in providing safe administration of superficial radiation therapy. Physician evaluation of the ultrasound information will be ongoing throughout the course of treatment and is deemed medically necessary to ensure the efficacy of treatment, whether to proceed with therapeutic delivery, and determine any necessary changes. Today's images were evaluated for determination of continuation of treatment with the current plan or with necessary changes as appropriate. Additionally, the use of ultrasound visualization and targeted assessment allows the patient to be able to see their cancer(s) progress, encouraging the patient to complete and maintain compliance through the full course of prescribed radiotherapy. Per Dr. David Yoder, continued ultrasound guidance and therapeutic radiology simulation-aided field setting verification per fraction is required for field placement, measurement of tumor depth, tissue evaluation, progress, acute effect monitoring, and determination for therapeutic treatment delivery is appropriate.
Daily Dosage (Cgy): 275.52
Energy (Kv): 100
Fraction Number: 5
Prescription Used: 1
Additional Comments (Add Customization Of Note Here): Patient has no side effects or questions at this time.
Ultrasound Used Text: High frequency ultrasound depth is 1.89 mm, which is 0.63 mm in difference from previous imaging.
Add X Modifier?: SILVA - Unusual Non-Overlapping Service
Ultrasound Not Used Text: Ultrasound was not performed today due to

## 2024-04-24 ENCOUNTER — APPOINTMENT (OUTPATIENT)
Dept: CARDIOLOGY | Facility: CLINIC | Age: 84
End: 2024-04-24
Payer: MEDICAID

## 2024-04-24 VITALS
RESPIRATION RATE: 16 BRPM | OXYGEN SATURATION: 98 % | TEMPERATURE: 96 F | DIASTOLIC BLOOD PRESSURE: 70 MMHG | HEIGHT: 64 IN | WEIGHT: 165 LBS | HEART RATE: 54 BPM | BODY MASS INDEX: 28.17 KG/M2 | SYSTOLIC BLOOD PRESSURE: 130 MMHG

## 2024-04-24 DIAGNOSIS — Z78.9 OTHER SPECIFIED HEALTH STATUS: ICD-10-CM

## 2024-04-24 DIAGNOSIS — I25.10 ATHEROSCLEROTIC HEART DISEASE OF NATIVE CORONARY ARTERY W/OUT ANGINA PECTORIS: ICD-10-CM

## 2024-04-24 DIAGNOSIS — R39.15 BENIGN PROSTATIC HYPERPLASIA WITH LOWER URINARY TRACT SYMPMS: ICD-10-CM

## 2024-04-24 DIAGNOSIS — Z95.1 PRESENCE OF AORTOCORONARY BYPASS GRAFT: ICD-10-CM

## 2024-04-24 DIAGNOSIS — E78.5 HYPERLIPIDEMIA, UNSPECIFIED: ICD-10-CM

## 2024-04-24 DIAGNOSIS — N40.1 BENIGN PROSTATIC HYPERPLASIA WITH LOWER URINARY TRACT SYMPMS: ICD-10-CM

## 2024-04-24 PROCEDURE — 99204 OFFICE O/P NEW MOD 45 MIN: CPT | Mod: 25

## 2024-04-24 PROCEDURE — G2211 COMPLEX E/M VISIT ADD ON: CPT | Mod: NC,1L

## 2024-04-24 PROCEDURE — 93000 ELECTROCARDIOGRAM COMPLETE: CPT

## 2024-04-24 RX ORDER — METOPROLOL TARTRATE 25 MG/1
25 TABLET, FILM COATED ORAL EVERY 8 HOURS
Qty: 90 | Refills: 2 | Status: DISCONTINUED | COMMUNITY
End: 2024-04-24

## 2024-04-24 RX ORDER — GUAIFENESIN 600 MG/1
600 TABLET, EXTENDED RELEASE ORAL
Qty: 60 | Refills: 0 | Status: DISCONTINUED | COMMUNITY
Start: 2021-09-03 | End: 2024-04-24

## 2024-04-24 NOTE — ASSESSMENT
[FreeTextEntry1] : 83 y/o male with history as above  CAD S/p CABG No angina Complaint with medical therapy for secondary prevention C/w BB, ARB, ASA, statin  PAF Now in NSR Episodes of AF C/w Eliquis for CVA prevention C/w Amiodarone Obtain results of recent Holter and echo. If persistent symptoms, will refer to EP to evaluate for ablation.  HTN C/w ARB, BB Low salt diet  DL Change atorvastatin to 40 mg. Patient was advised about healthy lifestyle changes, including diet and exercise.  F/u with PMD, urology Return to cardiology in 2-3 months to re-assess AF symptoms, make a decision on ablation vs. medical therapy.

## 2024-04-24 NOTE — HISTORY OF PRESENT ILLNESS
[FreeTextEntry1] : 83 y/o male with history of PAF, CAD, s/p multiple PCI, s/p CABG in 2021 for 3 vessel disease (admitted with NSTEMI). Patient has been following in [facility] and [facility], but now would like to switch back to [facility]. Patient reports no chest pain or dyspnea. BP is controlled. Episodes of atrial fibrillation - 3-4 per month, symptomatic. Tolerating AC. Re-started amiodarone 200 mg. No syncope, TFT, LFT - normal. Labs reviewed. LDL 51 on current dose of Lipitor. He decreased the dose of b-blocker to 25 mg due to bradycardia.

## 2024-06-07 ENCOUNTER — APPOINTMENT (OUTPATIENT)
Dept: ELECTROPHYSIOLOGY | Facility: CLINIC | Age: 84
End: 2024-06-07

## 2024-06-07 VITALS
WEIGHT: 155 LBS | TEMPERATURE: 97.1 F | DIASTOLIC BLOOD PRESSURE: 78 MMHG | SYSTOLIC BLOOD PRESSURE: 140 MMHG | BODY MASS INDEX: 26.46 KG/M2 | HEART RATE: 50 BPM | HEIGHT: 64 IN

## 2024-06-07 DIAGNOSIS — Z86.39 PERSONAL HISTORY OF OTHER ENDOCRINE, NUTRITIONAL AND METABOLIC DISEASE: ICD-10-CM

## 2024-06-07 DIAGNOSIS — I48.0 PAROXYSMAL ATRIAL FIBRILLATION: ICD-10-CM

## 2024-06-07 DIAGNOSIS — I10 ESSENTIAL (PRIMARY) HYPERTENSION: ICD-10-CM

## 2024-06-07 DIAGNOSIS — Z78.9 OTHER SPECIFIED HEALTH STATUS: ICD-10-CM

## 2024-06-07 PROCEDURE — 99204 OFFICE O/P NEW MOD 45 MIN: CPT | Mod: 25

## 2024-06-07 PROCEDURE — G2211 COMPLEX E/M VISIT ADD ON: CPT | Mod: NC,1L

## 2024-06-07 PROCEDURE — 93000 ELECTROCARDIOGRAM COMPLETE: CPT

## 2024-06-07 RX ORDER — TAMSULOSIN HYDROCHLORIDE 0.4 MG/1
0.4 CAPSULE ORAL
Qty: 14 | Refills: 0 | Status: ACTIVE | COMMUNITY
Start: 2021-08-23

## 2024-06-07 RX ORDER — ASPIRIN ENTERIC COATED TABLETS 81 MG 81 MG/1
81 TABLET, DELAYED RELEASE ORAL DAILY
Qty: 1 | Refills: 2 | Status: ACTIVE | COMMUNITY
Start: 2021-08-23

## 2024-06-07 RX ORDER — ATORVASTATIN CALCIUM 40 MG/1
40 TABLET, FILM COATED ORAL DAILY
Qty: 90 | Refills: 3 | Status: ACTIVE | COMMUNITY
Start: 2024-04-24

## 2024-06-07 RX ORDER — APIXABAN 5 MG/1
5 TABLET, FILM COATED ORAL
Qty: 180 | Refills: 0 | Status: ACTIVE | COMMUNITY
Start: 2021-08-23

## 2024-06-07 RX ORDER — METOPROLOL SUCCINATE 25 MG/1
25 TABLET, EXTENDED RELEASE ORAL DAILY
Qty: 90 | Refills: 3 | Status: ACTIVE | COMMUNITY
Start: 2024-04-24

## 2024-06-07 RX ORDER — AMIODARONE HYDROCHLORIDE 200 MG/1
200 TABLET ORAL DAILY
Refills: 0 | Status: ACTIVE | COMMUNITY
Start: 2021-08-23

## 2024-06-07 RX ORDER — AMLODIPINE BESYLATE 5 MG/1
5 TABLET ORAL DAILY
Refills: 0 | Status: ACTIVE | COMMUNITY

## 2024-06-07 RX ORDER — PANTOPRAZOLE 40 MG/1
40 TABLET, DELAYED RELEASE ORAL
Qty: 30 | Refills: 3 | Status: ACTIVE | COMMUNITY
Start: 2021-08-23

## 2024-06-07 RX ORDER — MIRABEGRON 25 MG/1
25 TABLET, FILM COATED, EXTENDED RELEASE ORAL DAILY
Qty: 30 | Refills: 6 | Status: ACTIVE | COMMUNITY
Start: 2024-04-24

## 2024-06-07 RX ORDER — LOSARTAN POTASSIUM 100 MG/1
100 TABLET, FILM COATED ORAL DAILY
Refills: 0 | Status: ACTIVE | COMMUNITY

## 2024-06-07 NOTE — ADDENDUM
Wound Diameter In Cm(Optional): 0 [FreeTextEntry1] : Reta ALFARO assisted in documentation on 06/07/2024   acting as a scribe for Dr. Ian Gomes.  Wound Crusting?: clean Wound Evaluated By (Optional): Lance Rodriguez MD Lymphadenopathy?: absent Sutures?: intact Wound Color?: pink Detail Level: Detailed Add 04856 Cpt? (Important Note: In 2017 The Use Of 89518 Is Being Tracked By Cms To Determine Future Global Period Reimbursement For Global Periods): no Wound Edema?: mild

## 2024-06-07 NOTE — HISTORY OF PRESENT ILLNESS
[FreeTextEntry1] : 85 y/o male with history of PAF, CAD, s/p multiple PCI, s/p CABG in 2021 for 3 vessel disease (admitted with NSTEMI). Patient has been following in [facility] and [facility], but now would like to switch back to [facility]. Patient reports no chest pain or dyspnea.  06/07/2024: Patient comes to the office for further evaluation of atrial fibrillation. Patient has been on Amiodarone for over 2 years. At one point, patient stopped Amiodarone and his symptoms of palpitation return. Patient comes to the office today for further evaluation and discussion about ablation.     EKG (06/07/2024): Sinus rhythm at 50 bpm.

## 2024-06-07 NOTE — ASSESSMENT
[FreeTextEntry1] : PAF CHADVASC of 4 - Continue Eliquis 5 mg Q12, no bleeding.   - Continue Amiodarone 200 mg once a day for now.  - Continue Metoprolol 25 mg once a day.  - Discussed with patient different options and the possibility of ablation.  - Echocardiogram to evaluate LA size and function.   - Labs, including LFTs, TSH, and BMP.  - Chest x-ray. - Event monitor to evaluate AF burden.  - When patient returns to the office, will review results and further discuss the possibility of ablation.   HTN - Patient's pressure is well controlled.  - Continue Losartan 100 mg once a day.

## 2024-06-08 LAB
ALBUMIN SERPL ELPH-MCNC: 4.7 G/DL
ALP BLD-CCNC: 76 U/L
ALT SERPL-CCNC: 33 U/L
ANION GAP SERPL CALC-SCNC: 12 MMOL/L
AST SERPL-CCNC: 24 U/L
BILIRUB SERPL-MCNC: 0.8 MG/DL
BUN SERPL-MCNC: 16 MG/DL
CALCIUM SERPL-MCNC: 9.1 MG/DL
CHLORIDE SERPL-SCNC: 102 MMOL/L
CO2 SERPL-SCNC: 23 MMOL/L
CREAT SERPL-MCNC: 1.1 MG/DL
EGFR: 67 ML/MIN/1.73M2
GLUCOSE SERPL-MCNC: 97 MG/DL
HCT VFR BLD CALC: 43.9 %
HGB BLD-MCNC: 14.4 G/DL
MCHC RBC-ENTMCNC: 31.8 PG
MCHC RBC-ENTMCNC: 32.8 G/DL
MCV RBC AUTO: 96.9 FL
PLATELET # BLD AUTO: 170 K/UL
PMV BLD AUTO: 0 /100 WBCS
PMV BLD: 11.6 FL
POTASSIUM SERPL-SCNC: 4.3 MMOL/L
PROT SERPL-MCNC: 7.2 G/DL
RBC # BLD: 4.53 M/UL
RBC # FLD: 12.3 %
SODIUM SERPL-SCNC: 137 MMOL/L
TSH SERPL-ACNC: 1.03 UIU/ML
WBC # FLD AUTO: 5.7 K/UL

## 2024-06-14 ENCOUNTER — OUTPATIENT (OUTPATIENT)
Dept: OUTPATIENT SERVICES | Facility: HOSPITAL | Age: 84
LOS: 1 days | End: 2024-06-14
Payer: MEDICAID

## 2024-06-14 DIAGNOSIS — I48.0 PAROXYSMAL ATRIAL FIBRILLATION: ICD-10-CM

## 2024-06-14 PROCEDURE — 71046 X-RAY EXAM CHEST 2 VIEWS: CPT | Mod: 26

## 2024-06-14 PROCEDURE — 71046 X-RAY EXAM CHEST 2 VIEWS: CPT

## 2024-06-15 DIAGNOSIS — I48.0 PAROXYSMAL ATRIAL FIBRILLATION: ICD-10-CM

## 2024-06-26 ENCOUNTER — APPOINTMENT (OUTPATIENT)
Dept: CARDIOLOGY | Facility: CLINIC | Age: 84
End: 2024-06-26
Payer: MEDICAID

## 2024-06-26 ENCOUNTER — APPOINTMENT (OUTPATIENT)
Dept: CARDIOLOGY | Facility: CLINIC | Age: 84
End: 2024-06-26

## 2024-06-26 PROCEDURE — 93306 TTE W/DOPPLER COMPLETE: CPT

## 2024-08-16 ENCOUNTER — APPOINTMENT (OUTPATIENT)
Dept: ELECTROPHYSIOLOGY | Facility: CLINIC | Age: 84
End: 2024-08-16

## 2024-08-16 VITALS
BODY MASS INDEX: 28.17 KG/M2 | WEIGHT: 165 LBS | HEART RATE: 54 BPM | HEIGHT: 64 IN | DIASTOLIC BLOOD PRESSURE: 62 MMHG | SYSTOLIC BLOOD PRESSURE: 130 MMHG | TEMPERATURE: 98 F

## 2024-08-16 DIAGNOSIS — R00.1 BRADYCARDIA, UNSPECIFIED: ICD-10-CM

## 2024-08-16 DIAGNOSIS — Z87.898 PERSONAL HISTORY OF OTHER SPECIFIED CONDITIONS: ICD-10-CM

## 2024-08-16 DIAGNOSIS — R94.31 ABNORMAL ELECTROCARDIOGRAM [ECG] [EKG]: ICD-10-CM

## 2024-08-16 DIAGNOSIS — I48.0 PAROXYSMAL ATRIAL FIBRILLATION: ICD-10-CM

## 2024-08-16 PROCEDURE — 93000 ELECTROCARDIOGRAM COMPLETE: CPT

## 2024-08-16 PROCEDURE — 99214 OFFICE O/P EST MOD 30 MIN: CPT | Mod: 25

## 2024-08-16 PROCEDURE — G2211 COMPLEX E/M VISIT ADD ON: CPT | Mod: NC,1L

## 2024-08-19 PROBLEM — R00.1 BRADYCARDIA: Status: ACTIVE | Noted: 2024-08-19

## 2024-08-19 PROBLEM — R94.31 LONG QT INTERVAL: Status: ACTIVE | Noted: 2024-08-19

## 2024-08-19 PROBLEM — Z87.898 H/O SHORTNESS OF BREATH: Status: ACTIVE | Noted: 2024-08-19

## 2024-08-20 NOTE — ASSESSMENT
[FreeTextEntry1] : PAF CHADVASC of 4 - Continue Eliquis 5 mg Q12, no bleeding.   -Stop amiodarone due to QT prolongation. -Stop metoprolol due to decreased pulse.  -Echo results reviewed with patient.  -Event monitor reviewed. No AFib. However, patient has decreased pulse.  -Will continue to monitor for AFib for now.  -Consider ablation if patient continues to have more episodes of atrial fibrillation.    HTN - Patient's pressure is well controlled.  - Continue Losartan 100 mg once a day.    Long QT -Prolonged QT most likely caused by amiodarone. Stop amiodarone.    Bradycardia/SOB -Patient complains some shortness of breath. On event monitor, his maximum heart rate was 106. I wonder if patient has chronic tropic incompetence.  -Will stop amiodarone beta and metoprolol and reassess patient's symptoms at that time.

## 2024-08-20 NOTE — HISTORY OF PRESENT ILLNESS
[FreeTextEntry1] : 85 y/o male with history of PAF, CAD, s/p multiple PCI, s/p CABG in 2021 for 3 vessel disease (admitted with NSTEMI). Patient has been following in [facility] and [facility], but now would like to switch back to [facility]. Patient reports no chest pain or dyspnea.  06/07/2024: Patient comes to the office for further evaluation of atrial fibrillation. Patient has been on Amiodarone for over 2 years. At one point, patient stopped Amiodarone and his symptoms of palpitation return. Patient comes to the office today for further evaluation and discussion about ablation.    08/16/2024: Patient comes to my office for review of his medication. Patient complains of shortness of breath especially when walking up the stairs. Two weeks ago, patient stopped his metoprolol because his pulse was slow. No chest pain, nausea, vomiting or syncope.  . .   EKG(08/16/2024): Sinus rhythm 54 bpm with prolonged QT and a U wave. ECHO(06/2024): Ejection fraction 57%, mild LVH, Left atrium was mildly dilated, LA diameter 3.77. Event Monitor(06/2024): Average heart rate at 50 bpm, with range of 42 to 106. No atrial fibrillation, and no major arrhythmias. EKG (06/07/2024): Sinus rhythm at 50 bpm.

## 2024-08-20 NOTE — ADDENDUM
[FreeTextEntry1] : IReta assisted in documentation on 06/07/2024   acting as a scribe for Dr. Ian Gomes.   IKasia (scribe) assisted in filling out this chart under the dictation of Ian Gomes on 08/19/2024

## 2024-08-20 NOTE — HISTORY OF PRESENT ILLNESS
[FreeTextEntry1] : 83 y/o male with history of PAF, CAD, s/p multiple PCI, s/p CABG in 2021 for 3 vessel disease (admitted with NSTEMI). Patient has been following in [facility] and [facility], but now would like to switch back to [facility]. Patient reports no chest pain or dyspnea.  06/07/2024: Patient comes to the office for further evaluation of atrial fibrillation. Patient has been on Amiodarone for over 2 years. At one point, patient stopped Amiodarone and his symptoms of palpitation return. Patient comes to the office today for further evaluation and discussion about ablation.    08/16/2024: Patient comes to my office for review of his medication. Patient complains of shortness of breath especially when walking up the stairs. Two weeks ago, patient stopped his metoprolol because his pulse was slow. No chest pain, nausea, vomiting or syncope.  . .   EKG(08/16/2024): Sinus rhythm 54 bpm with prolonged QT and a U wave. ECHO(06/2024): Ejection fraction 57%, mild LVH, Left atrium was mildly dilated, LA diameter 3.77. Event Monitor(06/2024): Average heart rate at 50 bpm, with range of 42 to 106. No atrial fibrillation, and no major arrhythmias. EKG (06/07/2024): Sinus rhythm at 50 bpm.

## 2024-10-03 ENCOUNTER — APPOINTMENT (OUTPATIENT)
Dept: ELECTROPHYSIOLOGY | Facility: CLINIC | Age: 84
End: 2024-10-03

## 2024-10-03 VITALS
BODY MASS INDEX: 28.17 KG/M2 | WEIGHT: 165 LBS | HEIGHT: 64 IN | DIASTOLIC BLOOD PRESSURE: 60 MMHG | HEART RATE: 62 BPM | SYSTOLIC BLOOD PRESSURE: 140 MMHG

## 2024-10-03 DIAGNOSIS — R00.1 BRADYCARDIA, UNSPECIFIED: ICD-10-CM

## 2024-10-03 DIAGNOSIS — I48.0 PAROXYSMAL ATRIAL FIBRILLATION: ICD-10-CM

## 2024-10-03 DIAGNOSIS — R94.31 ABNORMAL ELECTROCARDIOGRAM [ECG] [EKG]: ICD-10-CM

## 2024-10-03 DIAGNOSIS — I10 ESSENTIAL (PRIMARY) HYPERTENSION: ICD-10-CM

## 2024-10-03 PROCEDURE — G2211 COMPLEX E/M VISIT ADD ON: CPT | Mod: NC

## 2024-10-03 PROCEDURE — 99215 OFFICE O/P EST HI 40 MIN: CPT | Mod: 57

## 2024-10-03 PROCEDURE — 93000 ELECTROCARDIOGRAM COMPLETE: CPT

## 2024-10-03 NOTE — END OF VISIT
[FreeTextEntry3] :  All medical record entries made by my scribe were at my, Dr. Ian Gomes, direction and personally dictated by me. I have reviewed the chart and agree that the record accurately reflects my personal performance of the history, physical exam, assessment and plan. I have also personally directed, reviewed, and agreed with the chart.

## 2024-10-03 NOTE — ASSESSMENT
[FreeTextEntry1] : PAF CHADVASC of 4 - Continue Eliquis 5 mg Q12, no bleeding.   -Stop amiodarone due to QT prolongation. -Stop metoprolol due to decreased pulse.  - Patient failed two antiarrhythmic drugs, Amiodarone and Multaq, and continues to have symptoms.  - Recommended AF ablation.  - I have discussed different treatment options with RADHA BAKER  including anti-arrhythmic medications or ablation.  After a long discussion, the patient would like to proceed with an ablation.  I have explained the risks and benefits of the procedure to the patient.  There is approximately 1-2% chance of any major cardiovascular complication to occur. Complications include, but are not limited to infection, bleeding, damage to the vessels, hole in the heart, stroke, death and heart attack. There is also 1% risk of phrenic nerve injury.  The patient understands the risk and would like to proceed with the procedure. Patient had opportunity to ask questions. Patient indicated that all of his questions were answered to his satisfaction and verbalized understanding.   HTN - Patient's pressure is well controlled.  - Continue Losartan 100 mg once a day.   Long QT -Prolonged QT most likely caused by amiodarone. Stop amiodarone.   - QT prolongation improved after stopping Amiodarone.      Bradycardia/SOB - Patient's bradycardia has resolved after stopping medications.

## 2024-10-03 NOTE — HISTORY OF PRESENT ILLNESS
[FreeTextEntry1] : 83 y/o male with history of PAF, CAD, s/p multiple PCI, s/p CABG in 2021 for 3 vessel disease (admitted with NSTEMI). Patient has been following in [facility] and [facility], but now would like to switch back to [facility]. Patient reports no chest pain or dyspnea.  06/07/2024: Patient comes to the office for further evaluation of atrial fibrillation. Patient has been on Amiodarone for over 2 years. At one point, patient stopped Amiodarone and his symptoms of palpitation return. Patient comes to the office today for further evaluation and discussion about ablation.    08/16/2024: Patient comes to my office for review of his medication. Patient complains of shortness of breath especially when walking up the stairs. Two weeks ago, patient stopped his metoprolol because his pulse was slow. No chest pain, nausea, vomiting or syncope.  . . 10/03/2024: Patient comes to the office today for follow-up. Patient states that he feels better than before, has less shortness of breath, and his pulse is significantly improved to 60 bpm. Patient notes that he can feel a difference with the difference in pulse rate. However, he had 4-6 episodes of atrial fibrillation, one episode lasting for one hour. Patient comes to the office today for further evaluation and discussion.      EKG (10/03/2024): Sinus rhythm at 62 bpm.   EKG (08/16/2024): Sinus rhythm 54 bpm with prolonged QT and a U wave. ECHO(06/2024): Ejection fraction 57%, mild LVH, Left atrium was mildly dilated, LA diameter 3.77. Event Monitor(06/2024): Average heart rate at 50 bpm, with range of 42 to 106. No atrial fibrillation, and no major arrhythmias. EKG (06/07/2024): Sinus rhythm at 50 bpm.

## 2024-10-03 NOTE — ADDENDUM
[FreeTextEntry1] : Reta ALFARO assisted in documentation on 10/03/2024   acting as a scribe for Dr. Ian Gomes.

## 2024-11-15 NOTE — ED CDU PROVIDER INITIAL DAY NOTE - SEPSIS ALERT QUESTION 1
PENDING AVAILITY FOR CPT 00011 FOR 1/27/25, Reference Number  [id number].  
Procedure Date: 01/27/2024  at  8a  Arrival time: 6a    Confirmed with patient and Nitida.    Reviewed preoperative instructions with patient, patient verbalized understanding. Patient informed if AC is discontinued or forgotten, to please call office asap.    Per patient, insurance is not changing with new year.    Patients back sx is scheduled for 12/13/2024.    Patient agreed they had patient education    1 mo post apt made on 02/27/2025 with .    [facility]ho calendar updated.    Please check insurance:   ABLATION ATRIAL FIB - CV [68023 (CPT®)]   
Procedure: PVI PFA redo surgical ablation. Likely lines as well.   Ligation of LIU in past  Labs: AM cbc/bmp/coags  Meds: continue home meds  Anesthesia: General    Needs to be scheduled once on AC for 1 month without interruption. Likely end of January.     
This patient was evaluated for sepsis.  At this time, a diagnosis of sepsis is not supported by the overall clinical picture.

## 2025-02-18 NOTE — ED CDU PROVIDER INITIAL DAY NOTE - ATTENDING CONTRIBUTION TO CARE
Name: Jad Richardson      : 1962      MRN: [id number]  Encounter Provider: Dee Estrada PA-C  Encounter Date: 2025   Encounter department: [facility] [address]  :  Assessment & Plan  Essential hypertension  Recently elevated and with headaches  Orders:  •  valsartan (DIOVAN) 80 mg tablet; Take 1 tablet (80 mg total) by mouth daily  •  Lipid Panel with Direct LDL reflex; Future  •  TSH, 3rd generation with Free T4 reflex; Future    Mixed hyperlipidemia    Orders:  •  Lipid Panel with Direct LDL reflex; Future  •  TSH, 3rd generation with Free T4 reflex; Future           History of Present Illness   Pt went to the ER yesterday after having a headache for a week and blurred vision after leaving the gym. She took her blood pressure at home and it was very elevated. The ER evaluated her and gave her their migraine cocktail for headache and Apresoline for blood pressure. Her blood pressure came down and she was discharged. She was given a Rx for clonidine to take if systolic > 180.    Hypertension  This is a chronic problem. The problem has been gradually worsening since onset. The problem is uncontrolled. Associated symptoms include blurred vision and headaches. Pertinent negatives include no anxiety, chest pain, malaise/fatigue, neck pain, orthopnea, palpitations, peripheral edema, PND, shortness of breath or sweats. There are no associated agents to hypertension. Risk factors for coronary artery disease include dyslipidemia and post-menopausal state. Past treatments include beta blockers. The current treatment provides moderate improvement. There are no compliance problems.  There is no history of angina, kidney disease, CAD/MI, CVA, heart failure, left ventricular hypertrophy, PVD or retinopathy.     Review of Systems   Constitutional:  Negative for diaphoresis and malaise/fatigue.   Eyes:  Positive for blurred vision. Negative for pain and visual disturbance.  "  Respiratory:  Negative for chest tightness and shortness of breath.    Cardiovascular:  Negative for chest pain, palpitations, orthopnea, leg swelling and PND.   Gastrointestinal:  Negative for abdominal pain and nausea.   Musculoskeletal:  Negative for neck pain.   Neurological:  Positive for headaches. Negative for dizziness, light-headedness and numbness.       Objective   /92 (BP Location: Left arm, Patient Position: Sitting, Cuff Size: Standard)   Pulse 68   Resp 18   Ht 5' 1\" (1.549 m)   Wt 59.4 kg (131 lb)   LMP 02/09/2018 (Exact Date)   SpO2 98%   BMI 24.75 kg/m²      Physical Exam  Vitals and nursing note reviewed.   Constitutional:       Appearance: Normal appearance.   HENT:      Head: Normocephalic.   Eyes:      Conjunctiva/sclera: Conjunctivae normal.   Neck:      Vascular: No carotid bruit.   Cardiovascular:      Rate and Rhythm: Normal rate and regular rhythm.      Pulses: Normal pulses.      Heart sounds: Normal heart sounds.   Pulmonary:      Effort: Pulmonary effort is normal.      Breath sounds: Normal breath sounds.   Musculoskeletal:      Cervical back: Normal range of motion.   Lymphadenopathy:      Cervical: No cervical adenopathy.   Neurological:      Mental Status: She is alert and oriented to person, place, and time.   Psychiatric:         Mood and Affect: Mood normal.         Behavior: Behavior normal.         " 80 y/o , w/ pmhx of CAD s/p PCI x 3 (2001x1; 2010 x2), Afib on Eliquis, HTN and HLD admission from 8/8/ to 8/12 for syncope  associated with chest pain and palpitations s/p cardiac cath revealed 3vcad (on 08/12/21, he underwent surgical myocardial revascularization. Post-operatively,  the patient had recurrent a. fib and converted to sinus rhythm pharmacologically.  He also developed diarrhea which was attributed to colchicine.  The diarrhea resolved once the colchicine was stopped.  He otherwise had an uneventful hospital course and was discharged home in stable condition on POD #7.  Statin therapy was stopped due to an increase in LFT's.) presents today for palpitations associated with generalized weakness. No fever, chills, n/v, cp, sob, pleuritic cp,  diaphoresis, cough, ha/lh/dizziness, numbness/tingling, neck pain/ stiffness, abd pain, diarrhea, constipation, melena/brbpr, urinary symptoms, trauma, weakness, edema, calf pain/swelling/erythema, sick contacts, recent travel or rash. (-) smoker, Dr. Gurrola- ct surgeon.     on exam: wdwn non toxic appearing pt sitting on stretcher speaking full sentences, no rash, mmm, neck supple. non-tender , irregular, irregular, no jvd, no pain to palpation to chest wall, no pain with movement, ctabl w/ breath sounds present b/l, no wheezing or crackles, no accessory muscle use, no tachypnea, no stridor, bs present throughout all 4 quadrants, abd soft, nd, nt, no rebound tenderness or guarding, no cvat, FROM of ext, no calf pain/swelling/erythema, AAOx3. motor 5/5 and sensation intact throughout upper and lower ext. no focal deficits.    pt feeling better, no current symptoms, on monitor, meds reviewed and given in discussion with Intensivist Dr. Mcleod, will continue to monitor an dreassess.

## 2025-03-31 ENCOUNTER — OUTPATIENT (OUTPATIENT)
Dept: OUTPATIENT SERVICES | Facility: HOSPITAL | Age: 85
LOS: 1 days | End: 2025-03-31
Payer: MEDICAID

## 2025-03-31 ENCOUNTER — RESULT REVIEW (OUTPATIENT)
Age: 85
End: 2025-03-31

## 2025-03-31 VITALS
RESPIRATION RATE: 16 BRPM | DIASTOLIC BLOOD PRESSURE: 81 MMHG | SYSTOLIC BLOOD PRESSURE: 151 MMHG | TEMPERATURE: 99 F | WEIGHT: 164.91 LBS | HEART RATE: 85 BPM | HEIGHT: 64.57 IN | OXYGEN SATURATION: 96 %

## 2025-03-31 DIAGNOSIS — I48.19 OTHER PERSISTENT ATRIAL FIBRILLATION: ICD-10-CM

## 2025-03-31 DIAGNOSIS — Z01.818 ENCOUNTER FOR OTHER PREPROCEDURAL EXAMINATION: ICD-10-CM

## 2025-03-31 DIAGNOSIS — Z95.1 PRESENCE OF AORTOCORONARY BYPASS GRAFT: Chronic | ICD-10-CM

## 2025-03-31 LAB
ALBUMIN SERPL ELPH-MCNC: 4.5 G/DL — SIGNIFICANT CHANGE UP (ref 3.5–5.2)
ALP SERPL-CCNC: 105 U/L — SIGNIFICANT CHANGE UP (ref 30–115)
ALT FLD-CCNC: 19 U/L — SIGNIFICANT CHANGE UP (ref 0–41)
ANION GAP SERPL CALC-SCNC: 12 MMOL/L — SIGNIFICANT CHANGE UP (ref 7–14)
AST SERPL-CCNC: 18 U/L — SIGNIFICANT CHANGE UP (ref 0–41)
BASOPHILS # BLD AUTO: 0.02 K/UL — SIGNIFICANT CHANGE UP (ref 0–0.2)
BASOPHILS NFR BLD AUTO: 0.3 % — SIGNIFICANT CHANGE UP (ref 0–1)
BILIRUB SERPL-MCNC: 0.5 MG/DL — SIGNIFICANT CHANGE UP (ref 0.2–1.2)
BLD GP AB SCN SERPL QL: SIGNIFICANT CHANGE UP
BUN SERPL-MCNC: 17 MG/DL — SIGNIFICANT CHANGE UP (ref 10–20)
CALCIUM SERPL-MCNC: 9.2 MG/DL — SIGNIFICANT CHANGE UP (ref 8.4–10.5)
CHLORIDE SERPL-SCNC: 102 MMOL/L — SIGNIFICANT CHANGE UP (ref 98–110)
CO2 SERPL-SCNC: 24 MMOL/L — SIGNIFICANT CHANGE UP (ref 17–32)
CREAT SERPL-MCNC: 0.9 MG/DL — SIGNIFICANT CHANGE UP (ref 0.7–1.5)
EGFR: 85 ML/MIN/1.73M2 — SIGNIFICANT CHANGE UP
EGFR: 85 ML/MIN/1.73M2 — SIGNIFICANT CHANGE UP
EOSINOPHIL # BLD AUTO: 0.08 K/UL — SIGNIFICANT CHANGE UP (ref 0–0.7)
EOSINOPHIL NFR BLD AUTO: 1.3 % — SIGNIFICANT CHANGE UP (ref 0–8)
GLUCOSE SERPL-MCNC: 95 MG/DL — SIGNIFICANT CHANGE UP (ref 70–99)
HCT VFR BLD CALC: 42.3 % — SIGNIFICANT CHANGE UP (ref 42–52)
HGB BLD-MCNC: 14.4 G/DL — SIGNIFICANT CHANGE UP (ref 14–18)
IMM GRANULOCYTES NFR BLD AUTO: 0.3 % — SIGNIFICANT CHANGE UP (ref 0.1–0.3)
LYMPHOCYTES # BLD AUTO: 1.44 K/UL — SIGNIFICANT CHANGE UP (ref 1.2–3.4)
LYMPHOCYTES # BLD AUTO: 23.9 % — SIGNIFICANT CHANGE UP (ref 20.5–51.1)
MCHC RBC-ENTMCNC: 30.5 PG — SIGNIFICANT CHANGE UP (ref 27–31)
MCHC RBC-ENTMCNC: 34 G/DL — SIGNIFICANT CHANGE UP (ref 32–37)
MCV RBC AUTO: 89.6 FL — SIGNIFICANT CHANGE UP (ref 80–94)
MONOCYTES # BLD AUTO: 0.62 K/UL — HIGH (ref 0.1–0.6)
MONOCYTES NFR BLD AUTO: 10.3 % — HIGH (ref 1.7–9.3)
NEUTROPHILS # BLD AUTO: 3.84 K/UL — SIGNIFICANT CHANGE UP (ref 1.4–6.5)
NEUTROPHILS NFR BLD AUTO: 63.9 % — SIGNIFICANT CHANGE UP (ref 42.2–75.2)
NRBC BLD AUTO-RTO: 0 /100 WBCS — SIGNIFICANT CHANGE UP (ref 0–0)
PLATELET # BLD AUTO: 200 K/UL — SIGNIFICANT CHANGE UP (ref 130–400)
PMV BLD: 10.7 FL — HIGH (ref 7.4–10.4)
POTASSIUM SERPL-MCNC: 4.2 MMOL/L — SIGNIFICANT CHANGE UP (ref 3.5–5)
POTASSIUM SERPL-SCNC: 4.2 MMOL/L — SIGNIFICANT CHANGE UP (ref 3.5–5)
PROT SERPL-MCNC: 7 G/DL — SIGNIFICANT CHANGE UP (ref 6–8)
RBC # BLD: 4.72 M/UL — SIGNIFICANT CHANGE UP (ref 4.7–6.1)
RBC # FLD: 11.9 % — SIGNIFICANT CHANGE UP (ref 11.5–14.5)
SODIUM SERPL-SCNC: 138 MMOL/L — SIGNIFICANT CHANGE UP (ref 135–146)
WBC # BLD: 6.02 K/UL — SIGNIFICANT CHANGE UP (ref 4.8–10.8)
WBC # FLD AUTO: 6.02 K/UL — SIGNIFICANT CHANGE UP (ref 4.8–10.8)

## 2025-03-31 PROCEDURE — 80053 COMPREHEN METABOLIC PANEL: CPT

## 2025-03-31 PROCEDURE — 71046 X-RAY EXAM CHEST 2 VIEWS: CPT

## 2025-03-31 PROCEDURE — 99214 OFFICE O/P EST MOD 30 MIN: CPT | Mod: 25

## 2025-03-31 PROCEDURE — 86850 RBC ANTIBODY SCREEN: CPT

## 2025-03-31 PROCEDURE — 71046 X-RAY EXAM CHEST 2 VIEWS: CPT | Mod: 26

## 2025-03-31 PROCEDURE — 85025 COMPLETE CBC W/AUTO DIFF WBC: CPT

## 2025-03-31 PROCEDURE — 86900 BLOOD TYPING SEROLOGIC ABO: CPT

## 2025-03-31 PROCEDURE — 86901 BLOOD TYPING SEROLOGIC RH(D): CPT

## 2025-03-31 PROCEDURE — 93010 ELECTROCARDIOGRAM REPORT: CPT

## 2025-03-31 PROCEDURE — 36415 COLL VENOUS BLD VENIPUNCTURE: CPT

## 2025-03-31 PROCEDURE — 93005 ELECTROCARDIOGRAM TRACING: CPT

## 2025-03-31 NOTE — H&P PST ADULT - REASON FOR ADMISSION
Case Type: OP Block TimeSuite: EP LabProceduralist: Ian Gomes  Confirmed Surgery DateTime: 04- - 11:00PAST DateTime: 03- - 11:30Procedure: AF ABLATION HANNAH PFA  ERP?: UnavailableLaterality: N/ALength of Procedure: 180 Minutes  Anesthesia Type: General

## 2025-03-31 NOTE — H&P PST ADULT - HISTORY OF PRESENT ILLNESS
83 yo male PAST MEDICAL & SURGICAL HISTORY: CAD, HLD, Hypertension, Afib, S/P CABG x 6 accompanied by his wife ( Vaishnavi) presents for PAST in preparation for   AF ABLATION HANNAH PFA. According to the pt he has arrhythmia for several years S/P "open heart surgery" which he resumed  a NSR but shortly after he was back in A fib. Also, occasionally gets SOB while taking stairs. NO SOB at rest.   PATIENT/GUARDIAN CURRENTLY DENIES CHEST PAIN  SHORTNESS OF BREATH  PALPITATIONS,  DYSURIA, OR UPPER RESPIRATORY INFECTION IN PAST 2 WEEKS    Anesthesia Alert  NO--Difficult Airway  NO--History of neck surgery or radiation  NO--Limited ROM of neck  NO--History of Malignant hyperthermia  NO--No personal or family history of Pseudocholinesterase deficiency.  NO--Prior Anesthesia Complication  NO--Latex Allergy  NO--Loose teeth  NO--History of Rheumatoid Arthritis  NO--Bleeding risk  NO--JASON  NO--Other_____  Duke Activity Status Index (DASI) from [employer]  on 3/31/2025  ** All calculations should be rechecked by clinician prior to use **    RESULT SUMMARY:  20.45 points  The higher the score (maximum 58.2), the higher the functional status.    5.26 METs        INPUTS:  Take care of self —> 2.75 = Yes  Walk indoors —> 1.75 = Yes  Walk 1&ndash;2 blocks on level ground —> 0 = No  Climb a flight of stairs or walk up a hill —> 0 = No  Run a short distance —> 0 = No  Do light work around the house —> 2.7 = Yes  Do moderate work around the house —> 0 = No  Do heavy work around the house —> 8 = Yes  Do yardwork —> 0 = No  Have sexual relations —> 5.25 = Yes  Participate in moderate recreational activities —> 0 = No  Participate in strenuous sports —> 0 = No  Revised Cardiac Risk Index for Pre-Operative Risk from [employer]  on 3/31/2025  ** All calculations should be rechecked by clinician prior to use **    RESULT SUMMARY:  1 points  RCRI Score    6.0 %  Risk of major cardiac event      INPUTS:  High-risk surgery —> 0 = No  History of ischemic heart disease —> 1 = Yes  History of congestive heart failure —> 0 = No  History of cerebrovascular disease —> 0 = No  Pre-operative treatment with insulin —> 0 = No  Pre-operative creatinine >2 mg/dL / 176.8 µmol/L —> 0 = No    PT/GUARDIAN DENIES ANY RASHES, ABRASION, OR OPEN WOUNDS OR CUTS    AS PER THE PT/GUARDIAN, THIS IS HIS/HER COMPLETE MEDICAL AND SURGICAL HX, INCLUDING MEDICATIONS PRESCRIBED AND OVER THE COUNTER  Patient/guardian understands the instructions and was given the opportunity to ask questions and have them answered.  pt/guardian denies any suicidal ideation or thoughts, pt states not a threat to self or others   85 yo male PAST MEDICAL & SURGICAL HISTORY: CAD, HLD, Hypertension, Afib, S/P CABG x 6 accompanied by his wife ( Vaishnavi) presents for PAST in preparation for   AF ABLATION HANNAH PFA. According to the pt he has arrhythmia for several years S/P "open heart surgery" which he resumed  a NSR but shortly after he was back in A fib. Also, occasionally gets SOB while taking stairs. NO SOB at rest.   PATIENT/GUARDIAN CURRENTLY DENIES CHEST PAIN  SHORTNESS OF BREATH  PALPITATIONS,  DYSURIA, OR UPPER RESPIRATORY INFECTION IN PAST 2 WEEKS    Anesthesia Alert  NO--Difficult Airway  NO--History of neck surgery or radiation  NO--Limited ROM of neck  NO--History of Malignant hyperthermia  NO--No personal or family history of Pseudocholinesterase deficiency.  NO--Prior Anesthesia Complication  NO--Latex Allergy  NO--Loose teeth  NO--History of Rheumatoid Arthritis  YES--Bleeding risk, ELiquis   NO--JASON  NO--Other_____  Duke Activity Status Index (DASI) from [employer]  on 3/31/2025  ** All calculations should be rechecked by clinician prior to use **    RESULT SUMMARY:  20.45 points  The higher the score (maximum 58.2), the higher the functional status.    5.26 METs        INPUTS:  Take care of self —> 2.75 = Yes  Walk indoors —> 1.75 = Yes  Walk 1&ndash;2 blocks on level ground —> 0 = No  Climb a flight of stairs or walk up a hill —> 0 = No  Run a short distance —> 0 = No  Do light work around the house —> 2.7 = Yes  Do moderate work around the house —> 0 = No  Do heavy work around the house —> 8 = Yes  Do yardwork —> 0 = No  Have sexual relations —> 5.25 = Yes  Participate in moderate recreational activities —> 0 = No  Participate in strenuous sports —> 0 = No  Revised Cardiac Risk Index for Pre-Operative Risk from [employer]  on 3/31/2025  ** All calculations should be rechecked by clinician prior to use **    RESULT SUMMARY:  1 points  RCRI Score    6.0 %  Risk of major cardiac event      INPUTS:  High-risk surgery —> 0 = No  History of ischemic heart disease —> 1 = Yes  History of congestive heart failure —> 0 = No  History of cerebrovascular disease —> 0 = No  Pre-operative treatment with insulin —> 0 = No  Pre-operative creatinine >2 mg/dL / 176.8 µmol/L —> 0 = No    PT/GUARDIAN DENIES ANY RASHES, ABRASION, OR OPEN WOUNDS OR CUTS    AS PER THE PT/GUARDIAN, THIS IS HIS/HER COMPLETE MEDICAL AND SURGICAL HX, INCLUDING MEDICATIONS PRESCRIBED AND OVER THE COUNTER  Patient/guardian understands the instructions and was given the opportunity to ask questions and have them answered.  pt/guardian denies any suicidal ideation or thoughts, pt states not a threat to self or others

## 2025-04-01 DIAGNOSIS — I48.19 OTHER PERSISTENT ATRIAL FIBRILLATION: ICD-10-CM

## 2025-04-01 DIAGNOSIS — Z01.818 ENCOUNTER FOR OTHER PREPROCEDURAL EXAMINATION: ICD-10-CM

## 2025-04-14 ENCOUNTER — INPATIENT (INPATIENT)
Facility: HOSPITAL | Age: 85
LOS: 0 days | Discharge: HOME | DRG: 201 | End: 2025-04-15
Attending: INTERNAL MEDICINE | Admitting: STUDENT IN AN ORGANIZED HEALTH CARE EDUCATION/TRAINING PROGRAM
Payer: MEDICAID

## 2025-04-14 ENCOUNTER — APPOINTMENT (OUTPATIENT)
Dept: ELECTROPHYSIOLOGY | Facility: HOSPITAL | Age: 85
End: 2025-04-14

## 2025-04-14 ENCOUNTER — TRANSCRIPTION ENCOUNTER (OUTPATIENT)
Age: 85
End: 2025-04-14

## 2025-04-14 VITALS
WEIGHT: 164.91 LBS | HEART RATE: 76 BPM | TEMPERATURE: 98 F | DIASTOLIC BLOOD PRESSURE: 79 MMHG | HEIGHT: 64.57 IN | SYSTOLIC BLOOD PRESSURE: 180 MMHG | OXYGEN SATURATION: 98 % | RESPIRATION RATE: 16 BRPM

## 2025-04-14 DIAGNOSIS — I48.19 OTHER PERSISTENT ATRIAL FIBRILLATION: ICD-10-CM

## 2025-04-14 DIAGNOSIS — Z95.1 PRESENCE OF AORTOCORONARY BYPASS GRAFT: Chronic | ICD-10-CM

## 2025-04-14 LAB — BLD GP AB SCN SERPL QL: SIGNIFICANT CHANGE UP

## 2025-04-14 PROCEDURE — 80048 BASIC METABOLIC PNL TOTAL CA: CPT

## 2025-04-14 PROCEDURE — 36415 COLL VENOUS BLD VENIPUNCTURE: CPT

## 2025-04-14 PROCEDURE — 93320 DOPPLER ECHO COMPLETE: CPT | Mod: 26

## 2025-04-14 PROCEDURE — C9399: CPT

## 2025-04-14 PROCEDURE — 93656 COMPRE EP EVAL ABLTJ ATR FIB: CPT

## 2025-04-14 PROCEDURE — 93623 PRGRMD STIMJ&PACG IV RX NFS: CPT | Mod: 26

## 2025-04-14 PROCEDURE — 93325 DOPPLER ECHO COLOR FLOW MAPG: CPT | Mod: 26

## 2025-04-14 PROCEDURE — 93312 ECHO TRANSESOPHAGEAL: CPT | Mod: 26

## 2025-04-14 PROCEDURE — 93005 ELECTROCARDIOGRAM TRACING: CPT

## 2025-04-14 RX ORDER — ASPIRIN 325 MG
81 TABLET ORAL DAILY
Refills: 0 | Status: DISCONTINUED | OUTPATIENT
Start: 2025-04-14 | End: 2025-04-15

## 2025-04-14 RX ORDER — TAMSULOSIN HYDROCHLORIDE 0.4 MG/1
1 CAPSULE ORAL
Refills: 0 | DISCHARGE

## 2025-04-14 RX ORDER — LOSARTAN POTASSIUM 100 MG/1
100 TABLET, FILM COATED ORAL DAILY
Refills: 0 | Status: DISCONTINUED | OUTPATIENT
Start: 2025-04-14 | End: 2025-04-15

## 2025-04-14 RX ORDER — AMLODIPINE BESYLATE 10 MG/1
1 TABLET ORAL
Refills: 0 | DISCHARGE

## 2025-04-14 RX ORDER — LATANOPROST PF 0.05 MG/ML
1 SOLUTION/ DROPS OPHTHALMIC AT BEDTIME
Refills: 0 | Status: DISCONTINUED | OUTPATIENT
Start: 2025-04-14 | End: 2025-04-15

## 2025-04-14 RX ORDER — LOSARTAN POTASSIUM 100 MG/1
1 TABLET, FILM COATED ORAL
Refills: 0 | DISCHARGE

## 2025-04-14 RX ORDER — APIXABAN 2.5 MG/1
5 TABLET, FILM COATED ORAL EVERY 12 HOURS
Refills: 0 | Status: DISCONTINUED | OUTPATIENT
Start: 2025-04-14 | End: 2025-04-15

## 2025-04-14 RX ORDER — AMLODIPINE BESYLATE 10 MG/1
5 TABLET ORAL DAILY
Refills: 0 | Status: DISCONTINUED | OUTPATIENT
Start: 2025-04-14 | End: 2025-04-15

## 2025-04-14 RX ORDER — ATORVASTATIN CALCIUM 80 MG/1
1 TABLET, FILM COATED ORAL
Refills: 0 | DISCHARGE

## 2025-04-14 RX ORDER — ATORVASTATIN CALCIUM 80 MG/1
40 TABLET, FILM COATED ORAL AT BEDTIME
Refills: 0 | Status: DISCONTINUED | OUTPATIENT
Start: 2025-04-14 | End: 2025-04-15

## 2025-04-14 RX ORDER — ACETAMINOPHEN 500 MG/5ML
650 LIQUID (ML) ORAL EVERY 6 HOURS
Refills: 0 | Status: DISCONTINUED | OUTPATIENT
Start: 2025-04-14 | End: 2025-04-15

## 2025-04-14 RX ORDER — TAMSULOSIN HYDROCHLORIDE 0.4 MG/1
0.4 CAPSULE ORAL
Refills: 0 | Status: DISCONTINUED | OUTPATIENT
Start: 2025-04-14 | End: 2025-04-15

## 2025-04-14 RX ORDER — MELATONIN 5 MG
5 TABLET ORAL AT BEDTIME
Refills: 0 | Status: DISCONTINUED | OUTPATIENT
Start: 2025-04-14 | End: 2025-04-15

## 2025-04-14 RX ADMIN — ATORVASTATIN CALCIUM 40 MILLIGRAM(S): 80 TABLET, FILM COATED ORAL at 21:31

## 2025-04-14 RX ADMIN — AMLODIPINE BESYLATE 5 MILLIGRAM(S): 10 TABLET ORAL at 13:08

## 2025-04-14 RX ADMIN — Medication 40 MILLIGRAM(S): at 11:31

## 2025-04-14 RX ADMIN — Medication 5 MILLIGRAM(S): at 21:29

## 2025-04-14 RX ADMIN — APIXABAN 5 MILLIGRAM(S): 2.5 TABLET, FILM COATED ORAL at 21:29

## 2025-04-14 RX ADMIN — APIXABAN 5 MILLIGRAM(S): 2.5 TABLET, FILM COATED ORAL at 11:22

## 2025-04-14 RX ADMIN — TAMSULOSIN HYDROCHLORIDE 0.4 MILLIGRAM(S): 0.4 CAPSULE ORAL at 21:29

## 2025-04-14 RX ADMIN — LOSARTAN POTASSIUM 100 MILLIGRAM(S): 100 TABLET, FILM COATED ORAL at 12:49

## 2025-04-14 NOTE — DISCHARGE NOTE PROVIDER - NSDCCPCAREPLAN_GEN_ALL_CORE_FT
PRINCIPAL DISCHARGE DIAGNOSIS  Diagnosis: Paroxysmal atrial fibrillation  Assessment and Plan of Treatment:      PRINCIPAL DISCHARGE DIAGNOSIS  Diagnosis: Paroxysmal atrial fibrillation  Assessment and Plan of Treatment: Please continue your eliquis 5mg two times a day.

## 2025-04-14 NOTE — DISCHARGE NOTE PROVIDER - CARE PROVIDER_API CALL
Ian Gomes  Cardiac Electrophysiology  [address] [place] [address], [address]  [place] [address]  Phone: [phone]  Fax: [phone]  Established Patient  Follow Up Time: 1 month

## 2025-04-14 NOTE — DISCHARGE NOTE PROVIDER - NSDCMRMEDTOKEN_GEN_ALL_CORE_FT
amLODIPine 5 mg oral tablet: 1 tab(s) orally once a day  aspirin 81 mg oral delayed release tablet: 1 tab(s) orally once a day  atorvastatin 40 mg oral tablet: 1 tab(s) orally once a day  Eliquis 5 mg oral tablet: 1 tab(s) orally 2 times a day  Latanoprost Ophthalmic: once a day  losartan 100 mg oral tablet: 1 tab(s) orally once a day  tamsulosin 0.4 mg oral capsule: 1 cap(s) orally 1 to 2 times a day   amLODIPine 5 mg oral tablet: 1 tab(s) orally once a day  aspirin 81 mg oral delayed release tablet: 1 tab(s) orally once a day  atorvastatin 40 mg oral tablet: 1 tab(s) orally once a day  Eliquis 5 mg oral tablet: 1 tab(s) orally 2 times a day  latanoprost 0.005% ophthalmic solution: 1 drop(s) to each affected eye once a day (at bedtime)  losartan 100 mg oral tablet: 1 tab(s) orally once a day  pantoprazole 40 mg oral delayed release tablet: 1 tab(s) orally once a day (before a meal)  tamsulosin 0.4 mg oral capsule: 1 cap(s) orally 1 to 2 times a day

## 2025-04-14 NOTE — ASU PATIENT PROFILE, ADULT - FALL HARM RISK - HARM RISK INTERVENTIONS

## 2025-04-14 NOTE — DISCHARGE NOTE PROVIDER - NSDCFUSCHEDAPPT_GEN_ALL_CORE_FT
[facility]  ELECTROPH [address] [place] [address]  Scheduled Appointment: 05/29/2025

## 2025-04-14 NOTE — DISCHARGE NOTE PROVIDER - NSDCCPTREATMENT_GEN_ALL_CORE_FT
PRINCIPAL PROCEDURE  Procedure: Isolation, pulmonary vein, for atrial fibrillation  Findings and Treatment: - Please continue taking pantoprazole 40 mg daily for 30 days.   - You should continue your Eliquis.  - You may shower today.  - Do not drive or operate heavy machinery for 3 days.  - Do not submerge in water (example: baths, swimming) for 1 week.  - No squatting, exertional activities, or lifting anything > 10 lbs for 1 week.  - Any sudden swelling, redness, fever, discharge, or severe pain, call the Electrophysiology Office at [phone].     PRINCIPAL PROCEDURE  Procedure: Isolation, pulmonary vein, for atrial fibrillation  Findings and Treatment:   - Please continue taking pantoprazole 40 mg daily for 30 days.   - You should continue your Eliquis.  - You may shower today.  - Do not drive or operate heavy machinery for 3 days.  - Do not submerge in water (example: baths, swimming) for 1 week.  - No squatting, exertional activities, or lifting anything > 10 lbs for 1 week.  - Any sudden swelling, redness, fever, discharge, or severe pain, call the Electrophysiology Office at [phone].

## 2025-04-14 NOTE — DISCHARGE NOTE PROVIDER - ATTENDING DISCHARGE PHYSICAL EXAMINATION:
Physical Exam  T(C): 36.5 (04-15-25 @ 07:51), Max: 36.7 (04-14-25 @ 23:42)  HR: 69 (04-15-25 @ 07:51) (64 - 79)  BP: 121/60 (04-15-25 @ 07:51) (101/55 - 143/66)  RR: 18 (04-15-25 @ 07:51) (16 - 18)  SpO2: 99% (04-15-25 @ 07:51) (94% - 99%)  Gen: NAD  CV: RRR, nl S1 and S2, no m/r/g, no LE edema, no JVD  Pulm: CTAB, no crackles  GI: soft, nontender  MSK: normal ROM  Extremities: warm  Neuro: A+Ox3  Psych: cooperative

## 2025-04-14 NOTE — DISCHARGE NOTE PROVIDER - HOSPITAL COURSE
Patient is a 84y Male  with PMHx of Paroxsymal afib (on eliquis), CAD (s/p CABG in 2021), HTN, HLD, and BPH who presented to [facility] for elective AF Ablation. On 4/14/25 patient underwent successful PFA for atrial fibrillation. Patient was monitored overnight. On POD 1 patient remains HD stable with no complaints. Patient remains in SR with no arrhythmias noted on tele. Examination of B/L common femoral venous access sites reveal a Clear and dry area with no hematoma or erythema.  For PVI- Patient should continue Eliquis for thromboembolic prophylaxis. Patient is being DC home in stable condition.

## 2025-04-15 ENCOUNTER — TRANSCRIPTION ENCOUNTER (OUTPATIENT)
Age: 85
End: 2025-04-15

## 2025-04-15 VITALS
RESPIRATION RATE: 18 BRPM | OXYGEN SATURATION: 99 % | TEMPERATURE: 98 F | HEART RATE: 69 BPM | DIASTOLIC BLOOD PRESSURE: 60 MMHG | SYSTOLIC BLOOD PRESSURE: 121 MMHG

## 2025-04-15 LAB
ANION GAP SERPL CALC-SCNC: 10 MMOL/L — SIGNIFICANT CHANGE UP (ref 7–14)
BUN SERPL-MCNC: 28 MG/DL — HIGH (ref 10–20)
CALCIUM SERPL-MCNC: 8.6 MG/DL — SIGNIFICANT CHANGE UP (ref 8.4–10.5)
CHLORIDE SERPL-SCNC: 104 MMOL/L — SIGNIFICANT CHANGE UP (ref 98–110)
CO2 SERPL-SCNC: 24 MMOL/L — SIGNIFICANT CHANGE UP (ref 17–32)
CREAT SERPL-MCNC: 1 MG/DL — SIGNIFICANT CHANGE UP (ref 0.7–1.5)
EGFR: 75 ML/MIN/1.73M2 — SIGNIFICANT CHANGE UP
EGFR: 75 ML/MIN/1.73M2 — SIGNIFICANT CHANGE UP
GLUCOSE SERPL-MCNC: 109 MG/DL — HIGH (ref 70–99)
POTASSIUM SERPL-MCNC: 4.3 MMOL/L — SIGNIFICANT CHANGE UP (ref 3.5–5)
POTASSIUM SERPL-SCNC: 4.3 MMOL/L — SIGNIFICANT CHANGE UP (ref 3.5–5)
SODIUM SERPL-SCNC: 138 MMOL/L — SIGNIFICANT CHANGE UP (ref 135–146)

## 2025-04-15 PROCEDURE — 99233 SBSQ HOSP IP/OBS HIGH 50: CPT

## 2025-04-15 PROCEDURE — 93010 ELECTROCARDIOGRAM REPORT: CPT

## 2025-04-15 PROCEDURE — 99232 SBSQ HOSP IP/OBS MODERATE 35: CPT

## 2025-04-15 RX ORDER — LATANOPROST PF 0.05 MG/ML
0 SOLUTION/ DROPS OPHTHALMIC
Refills: 0 | DISCHARGE

## 2025-04-15 RX ORDER — APIXABAN 2.5 MG/1
1 TABLET, FILM COATED ORAL
Qty: 60 | Refills: 0
Start: 2025-04-15 | End: 2025-05-14

## 2025-04-15 RX ORDER — LATANOPROST PF 0.05 MG/ML
1 SOLUTION/ DROPS OPHTHALMIC
Qty: 0 | Refills: 0 | DISCHARGE
Start: 2025-04-15

## 2025-04-15 RX ORDER — APIXABAN 2.5 MG/1
1 TABLET, FILM COATED ORAL
Refills: 0 | DISCHARGE

## 2025-04-15 RX ADMIN — APIXABAN 5 MILLIGRAM(S): 2.5 TABLET, FILM COATED ORAL at 08:14

## 2025-04-15 RX ADMIN — AMLODIPINE BESYLATE 5 MILLIGRAM(S): 10 TABLET ORAL at 08:14

## 2025-04-15 RX ADMIN — Medication 81 MILLIGRAM(S): at 11:54

## 2025-04-15 RX ADMIN — Medication 40 MILLIGRAM(S): at 08:14

## 2025-04-15 RX ADMIN — LOSARTAN POTASSIUM 100 MILLIGRAM(S): 100 TABLET, FILM COATED ORAL at 08:14

## 2025-04-15 RX ADMIN — TAMSULOSIN HYDROCHLORIDE 0.4 MILLIGRAM(S): 0.4 CAPSULE ORAL at 08:14

## 2025-04-15 NOTE — DISCHARGE NOTE NURSING/CASE MANAGEMENT/SOCIAL WORK - FINANCIAL ASSISTANCE
[facility] provides services at a reduced cost to those who are determined to be eligible through [facility]’s financial assistance program. Information regarding [facility]’s financial assistance program can be found by going to [email or web address] or by calling [phone].

## 2025-04-15 NOTE — DISCHARGE NOTE NURSING/CASE MANAGEMENT/SOCIAL WORK - NSDCPEFALRISK_GEN_ALL_CORE
For information on Fall & Injury Prevention, visit: [email or web address] OR  [email or web address] OR  https://www.cdc.gov/steadi/patient.html

## 2025-04-15 NOTE — PROGRESS NOTE ADULT - ASSESSMENT
A/P  Patient S/P Afib Ablation  Paient is doing well  - continue Eliquis   - protonix 40 mg daily x 30 days  - No heavy lifting x 1 week  - Pt may shower today  - No bath/swimming x 1 week  - ok to discharge home today  - follow up with EP in 1 month

## 2025-04-15 NOTE — PROGRESS NOTE ADULT - SUBJECTIVE AND OBJECTIVE BOX
Electrophysiology Brief Post-Op Note    PRE-OP DIAGNOSIS: AF    POST-OP DIAGNOSIS: AF    PROCEDURE: ablation    Vendor Representative was present for clinical support.    Physician: Eliseo  Assistant: None    ANESTHESIA TYPE:  [  ]General Anesthesia  [X  ] Sedation  X[  ] Local/Regional    ESTIMATED BLOOD LOSS:   60    mL    CONDITION  [  ] Critical  [  ] Serious  [  ]Fair  [X  ]Good      SPECIMENS REMOVED (IF APPLICABLE):  none    IMPLANTS (IF APPLICABLE)  none    FINDINGS: none    COMPLICATIONS: none     PLAN OF CARE  -	Start Eliquis 5 mg q12 at 11:30 am  -	Start protonix 40 mg daily tonight  -	Bed rest till am  -	Admit to telemetry                    
I have personally seen and examined the patient.  I agree with the history and physical which I have reviewed and noted any changes below.  
INTERVAL HPI/OVERNIGHT EVENTS:    Patient s/p afib ablation.   No events over night  Patient is in NSR    MEDICATIONS  (STANDING):  amLODIPine   Tablet 5 milliGRAM(s) Oral daily  apixaban 5 milliGRAM(s) Oral every 12 hours  aspirin enteric coated 81 milliGRAM(s) Oral daily  atorvastatin 40 milliGRAM(s) Oral at bedtime  chlorhexidine 2% Cloths 1 Application(s) Topical <User Schedule>  latanoprost 0.005% Ophthalmic Solution 1 Drop(s) Both EYES at bedtime  losartan 100 milliGRAM(s) Oral daily  melatonin 5 milliGRAM(s) Oral at bedtime  pantoprazole    Tablet 40 milliGRAM(s) Oral before breakfast  tamsulosin 0.4 milliGRAM(s) Oral two times a day    MEDICATIONS  (PRN):  acetaminophen     Tablet .. 650 milliGRAM(s) Oral every 6 hours PRN Mild Pain (1 - 3)      Allergies    No Known Allergies    Vital Signs Last 24 Hrs  T(C): 36.7 (14 Apr 2025 23:42), Max: 36.7 (14 Apr 2025 23:42)  T(F): 98 (14 Apr 2025 23:42), Max: 98 (14 Apr 2025 23:42)  HR: 64 (15 Apr 2025 04:51) (60 - 79)  BP: 101/55 (15 Apr 2025 04:51) (101/55 - 172/83)  BP(mean): 72 (15 Apr 2025 04:51) (72 - 119)  RR: 18 (15 Apr 2025 04:51) (16 - 18)  SpO2: 94% (14 Apr 2025 16:19) (94% - 96%)        HEENT SANDRA EOMI  Lung CTAB  Heart RRR normal S1 S2  abd +BS soft  groins No hematoma, no bleeding  Ext no C/C/E

## 2025-04-15 NOTE — DISCHARGE NOTE NURSING/CASE MANAGEMENT/SOCIAL WORK - PATIENT PORTAL LINK FT
You can access the FollowMyHealth Patient Portal offered by [facility] by registering at the following website: [email or web address]. By joining [facility]’s FollowMyHealth portal, you will also be able to view your health information using other applications (apps) compatible with our system.

## 2025-04-18 DIAGNOSIS — E78.5 HYPERLIPIDEMIA, UNSPECIFIED: ICD-10-CM

## 2025-04-18 DIAGNOSIS — Z95.1 PRESENCE OF AORTOCORONARY BYPASS GRAFT: ICD-10-CM

## 2025-04-18 DIAGNOSIS — N40.0 BENIGN PROSTATIC HYPERPLASIA WITHOUT LOWER URINARY TRACT SYMPTOMS: ICD-10-CM

## 2025-04-18 DIAGNOSIS — I48.0 PAROXYSMAL ATRIAL FIBRILLATION: ICD-10-CM

## 2025-04-18 DIAGNOSIS — I10 ESSENTIAL (PRIMARY) HYPERTENSION: ICD-10-CM

## 2025-04-18 DIAGNOSIS — Z79.899 OTHER LONG TERM (CURRENT) DRUG THERAPY: ICD-10-CM

## 2025-04-18 DIAGNOSIS — Z79.82 LONG TERM (CURRENT) USE OF ASPIRIN: ICD-10-CM

## 2025-04-18 DIAGNOSIS — I25.10 ATHEROSCLEROTIC HEART DISEASE OF NATIVE CORONARY ARTERY WITHOUT ANGINA PECTORIS: ICD-10-CM

## 2025-04-18 DIAGNOSIS — Z79.01 LONG TERM (CURRENT) USE OF ANTICOAGULANTS: ICD-10-CM

## 2025-05-15 ENCOUNTER — APPOINTMENT (OUTPATIENT)
Dept: ELECTROPHYSIOLOGY | Facility: CLINIC | Age: 85
End: 2025-05-15

## 2025-05-15 VITALS
WEIGHT: 166 LBS | BODY MASS INDEX: 28.34 KG/M2 | HEIGHT: 64 IN | SYSTOLIC BLOOD PRESSURE: 133 MMHG | DIASTOLIC BLOOD PRESSURE: 70 MMHG | HEART RATE: 65 BPM

## 2025-05-15 DIAGNOSIS — I48.0 PAROXYSMAL ATRIAL FIBRILLATION: ICD-10-CM

## 2025-05-15 DIAGNOSIS — R00.1 BRADYCARDIA, UNSPECIFIED: ICD-10-CM

## 2025-05-15 DIAGNOSIS — I10 ESSENTIAL (PRIMARY) HYPERTENSION: ICD-10-CM

## 2025-05-15 PROCEDURE — 93000 ELECTROCARDIOGRAM COMPLETE: CPT

## 2025-05-15 PROCEDURE — 99214 OFFICE O/P EST MOD 30 MIN: CPT

## 2025-05-15 PROCEDURE — G2211 COMPLEX E/M VISIT ADD ON: CPT | Mod: NC

## 2025-06-04 NOTE — ED ADULT NURSE NOTE - NSFALLRSKPASTHIST_ED_ALL_ED
Patient ID: Damien Bello is a 68 y.o. male Date of Birth 1956       Chief Complaint   Patient presents with    Follow-up     L Plantar fascitis                Diagnosis:  1. Plantar fasciitis, left  2. Pes cavus of both feet    Bilateral pedal evaluation with socks and shoes removed.  I personally reviewed patient's medical records and PT notes.  Biomechanical and gait examination was performed, with and without custom orthotics.    Patient with discomfort in medial right knee, orthotic inspected, patient's gait inspected, he does pronate on the right side, I added 1/4 inch felt offloading to medial posting to prevent pronation, he is advised to try and wear this for 1 hour today, 2 hours tomorrow, alternate with over-the-counter insert which he finds very comfortable.  Continue home stretching exercises, strict no barefoot and left custom molded insert on the left side to prevent reoccurrence of plantar fasciitis.  I explained we can send back the orthotic for any adjustments once we know what needs to be accommodated.  He will follow-up in 1 week.          Subjective:   6/5/2025 Tad presents today for follow-up evaluation and care of left foot plantar fasciitis.  He was seen in our office on 5/1/2025 at which time I dispensed him custom orthotics and performed an injection to his left heel.  He states his left foot and heel feel great, after wearing the orthotics for a little bit he developed right knee pain, transition back to power step over-the-counter inserts on the right and the pain is subsiding, now it is just on the inside of his knee.    5/1/25 -Obdulio presents today for follow-up evaluation care of left foot plantar fasciitis and to  custom orthotics.  He states since discharge from PT and our last visit he continues to do very well.  He has stopped wearing his inserts from [employer] and transition to power step, he states the injection on 2/14 helped, he is having post static  "dyskinesia, pain is 5/10 at its worst.    4/4/25 Tad presents today for follow-up evaluation and care of left foot plantar fasciitis, he states since receiving injection on 2/14/2024 going to physical therapy he is doing great.  He was discharged from PT today.    2/14/25 Sapphire Mak presents today for evaluation and care of left heel pain, he states he has always had foot issues, he had special shoes when he was younger, when he lived in [place] he got a pair of custom orthotics which lasted him about 30 years, subsequently went to the [place] about a year ago and has been doing the routine and been doing okay and then last year when he went to [place] when he was not wearing the orthotics he started with heel pain, now pain at worst is 7/10.             The following portions of the patient's history were reviewed and updated as appropriate: allergies, current medications, past family history, past medical history, past social history, past surgical history, and problem list.        Objective:  Ht 5' 11\" (1.803 m)   Wt 94.3 kg (208 lb)   BMI 29.01 kg/m²     Review of Systems   Constitutional:  Negative for chills and fever.   HENT:  Negative for ear pain and sore throat.    Eyes:  Negative for pain and visual disturbance.   Respiratory:  Negative for cough and shortness of breath.    Cardiovascular:  Negative for chest pain and palpitations.   Gastrointestinal:  Negative for abdominal pain and vomiting.   Genitourinary:  Negative for dysuria and hematuria.   Musculoskeletal:  Negative for arthralgias and back pain.        Left plantar fasciitis   Skin:  Negative for color change and rash.   Neurological:  Negative for seizures and syncope.   All other systems reviewed and are negative.      Physical Exam  Constitutional:       Appearance: Normal appearance.   HENT:      Head: Normocephalic and atraumatic.      Right Ear: External ear normal.      Left Ear: External ear normal.      Nose: Nose normal. " "     Mouth/Throat:      Mouth: Mucous membranes are moist.      Pharynx: Oropharynx is clear.     Eyes:      Conjunctiva/sclera: Conjunctivae normal.      Pupils: Pupils are equal, round, and reactive to light.       Cardiovascular:      Pulses: Normal pulses.           Dorsalis pedis pulses are 2+ on the right side and 2+ on the left side.        Posterior tibial pulses are 2+ on the right side and 2+ on the left side.   Pulmonary:      Effort: Pulmonary effort is normal.     Musculoskeletal:      Cervical back: Normal range of motion.      Right lower leg: No edema.      Left lower leg: No edema.   Feet:      Right foot:      Protective Sensation: 10 sites tested.  10 sites sensed.      Skin integrity: Skin integrity normal.      Toenail Condition: Right toenails are normal.      Left foot:      Protective Sensation: 10 sites tested.  10 sites sensed.      Skin integrity: Skin integrity normal.      Toenail Condition: Left toenails are normal.      Comments: Left heel with no pain on palpation medial calcaneal tubercle, flexible pes cavus foot type with pronation ambulation, limited inversion and STJ, no signs of tarsal tunnel, equina varus deformity.   When ambulating with custom orthotic on the right, there is increased pronation likely the cause of his knee pain.    Skin:     General: Skin is warm and dry.      Capillary Refill: Capillary refill takes less than 2 seconds.     Neurological:      General: No focal deficit present.      Mental Status: He is alert and oriented to person, place, and time. Mental status is at baseline.     Psychiatric:         Mood and Affect: Mood normal.         Behavior: Behavior normal.         Thought Content: Thought content normal.         Judgment: Judgment normal.                  No pertinent results found.      Ellyn Almeida DPM, SARI, FACFAS    Portions of the record may have been created with voice recognition software. Occasional wrong word or \"sound a like\" " substitutions may have occurred due to the inherent limitations of voice recognition software. Read the chart carefully and recognize, using context, where substitutions have occurred.   no

## 2025-06-27 NOTE — ED CDU PROVIDER INITIAL DAY NOTE - CROS ED GI ALL NEG
If you have had surgery in the past 7-10 days by one of our providers and are having fever, bleeding, or drainage from an incision, have an opening in an incision, or having issues urinating properly, please call [phone] during normal office hours. Please call [phone] for any immediate needs AFTER HOURS.           ACTIVITY  As tolerated and as directed by your doctor.  Walking and mild exercise help to pass the stone fragments.     DIET  Clear liquids until no nausea and vomiting; then resume light diet for the first day  Advance to regular diet on second day, unless your doctor orders otherwise.  If nauseated and/ or vomiting, call your doctor.  Drink at least 8 glasses of water a day (avoid caffeinated beverages).    PAIN  Take pain medication as directed.  Call your doctor if pain is NOT relieved by medication.  DO NOT take aspirin or blood thinners until directed by your doctor.    MEDICATION INTERACTION:  During your procedure you potentially received a medication or medications which may reduce the effectiveness of oral contraceptives. Please consider other forms of contraception for 1 month following your procedure if you are currently using oral contraceptives as your primary form of birth control. In addition to this, we recommend continuing your oral contraceptive as prescribed, unless otherwise instructed by your physician, during this time.    CALL YOUR DOCTOR IF   Expect blood-tinged urine.  Call your doctor if it lasts more than 72 hours or if you cannot see through the urine.   Aches, chills, or fever of 101 degree F or above    Lithotripsy does not make your stone disappear.  The treatment is meant to crush your stone so that the fragments can be passed.  Strain your urine, save any fragments, and take them to your doctor.  The fragments may not begin to pass for up to 1-2 months.  You may experience slight bruising at the treated site.     After general anesthesia or intravenous sedation,  negative...

## 2025-09-18 ENCOUNTER — APPOINTMENT (OUTPATIENT)
Dept: ELECTROPHYSIOLOGY | Facility: CLINIC | Age: 85
End: 2025-09-18
Payer: MEDICAID

## 2025-09-18 VITALS
DIASTOLIC BLOOD PRESSURE: 70 MMHG | SYSTOLIC BLOOD PRESSURE: 130 MMHG | HEART RATE: 80 BPM | WEIGHT: 164 LBS | BODY MASS INDEX: 28 KG/M2 | HEIGHT: 64 IN

## 2025-09-18 DIAGNOSIS — I48.0 PAROXYSMAL ATRIAL FIBRILLATION: ICD-10-CM

## 2025-09-18 DIAGNOSIS — R94.31 ABNORMAL ELECTROCARDIOGRAM [ECG] [EKG]: ICD-10-CM

## 2025-09-18 DIAGNOSIS — I10 ESSENTIAL (PRIMARY) HYPERTENSION: ICD-10-CM

## 2025-09-18 PROCEDURE — G2211 COMPLEX E/M VISIT ADD ON: CPT | Mod: NC

## 2025-09-18 PROCEDURE — 93000 ELECTROCARDIOGRAM COMPLETE: CPT

## 2025-09-18 PROCEDURE — 99214 OFFICE O/P EST MOD 30 MIN: CPT

## 2025-09-21 PROBLEM — R94.31 LONG QT INTERVAL: Status: ACTIVE | Noted: 2025-09-21
